# Patient Record
Sex: FEMALE | Race: WHITE | Employment: OTHER | URBAN - METROPOLITAN AREA
[De-identification: names, ages, dates, MRNs, and addresses within clinical notes are randomized per-mention and may not be internally consistent; named-entity substitution may affect disease eponyms.]

---

## 2017-01-26 ENCOUNTER — HOSPITAL ENCOUNTER (OUTPATIENT)
Dept: ULTRASOUND IMAGING | Age: 82
Discharge: HOME | End: 2017-01-26
Attending: FAMILY MEDICINE | Admitting: FAMILY MEDICINE

## 2017-01-26 DIAGNOSIS — E83.52 HYPERCALCEMIA: ICD-10-CM

## 2017-01-30 ENCOUNTER — HOSPITAL ENCOUNTER (OUTPATIENT)
Dept: GENERAL RADIOLOGY | Age: 82
Discharge: HOME | End: 2017-01-30
Attending: FAMILY MEDICINE | Admitting: FAMILY MEDICINE

## 2017-01-30 DIAGNOSIS — M81.0 AGE-RELATED OSTEOPOROSIS WITHOUT CURRENT PATHOLOGICAL FRACTURE: ICD-10-CM

## 2017-01-30 DIAGNOSIS — M81.0 OSTEOPOROSIS: ICD-10-CM

## 2017-02-14 ENCOUNTER — OFFICE VISIT (OUTPATIENT)
Dept: ENT CLINIC | Age: 82
End: 2017-02-14

## 2017-02-14 VITALS — WEIGHT: 115 LBS | HEIGHT: 63 IN | BODY MASS INDEX: 20.38 KG/M2

## 2017-02-14 DIAGNOSIS — E04.2 MULTINODULAR GOITER: Primary | ICD-10-CM

## 2017-02-14 PROCEDURE — 4040F PNEUMOC VAC/ADMIN/RCVD: CPT | Performed by: OTOLARYNGOLOGY

## 2017-02-14 PROCEDURE — 1036F TOBACCO NON-USER: CPT | Performed by: OTOLARYNGOLOGY

## 2017-02-14 PROCEDURE — 1123F ACP DISCUSS/DSCN MKR DOCD: CPT | Performed by: OTOLARYNGOLOGY

## 2017-02-14 PROCEDURE — G8484 FLU IMMUNIZE NO ADMIN: HCPCS | Performed by: OTOLARYNGOLOGY

## 2017-02-14 PROCEDURE — 99202 OFFICE O/P NEW SF 15 MIN: CPT | Performed by: OTOLARYNGOLOGY

## 2017-02-14 PROCEDURE — 1090F PRES/ABSN URINE INCON ASSESS: CPT | Performed by: OTOLARYNGOLOGY

## 2017-02-14 PROCEDURE — G8399 PT W/DXA RESULTS DOCUMENT: HCPCS | Performed by: OTOLARYNGOLOGY

## 2017-02-14 PROCEDURE — G8419 CALC BMI OUT NRM PARAM NOF/U: HCPCS | Performed by: OTOLARYNGOLOGY

## 2017-02-14 PROCEDURE — G8427 DOCREV CUR MEDS BY ELIG CLIN: HCPCS | Performed by: OTOLARYNGOLOGY

## 2017-02-14 RX ORDER — AMLODIPINE BESYLATE 5 MG/1
10 TABLET ORAL NIGHTLY
Status: ON HOLD | COMMUNITY
Start: 2017-01-14 | End: 2021-07-21 | Stop reason: HOSPADM

## 2017-02-14 RX ORDER — HYDROCHLOROTHIAZIDE 12.5 MG/1
TABLET ORAL NIGHTLY
Status: ON HOLD | COMMUNITY
Start: 2017-02-03 | End: 2017-03-15 | Stop reason: HOSPADM

## 2017-02-15 ENCOUNTER — TELEPHONE (OUTPATIENT)
Dept: ENT CLINIC | Age: 82
End: 2017-02-15

## 2017-02-15 DIAGNOSIS — E04.9 GOITER: Primary | ICD-10-CM

## 2017-02-15 LAB — TSH SERPL DL<=0.05 MIU/L-ACNC: 1.02 UIU/ML (ref 0.27–4.2)

## 2017-02-15 PROCEDURE — 36415 COLL VENOUS BLD VENIPUNCTURE: CPT | Performed by: OTOLARYNGOLOGY

## 2017-02-17 ENCOUNTER — TELEPHONE (OUTPATIENT)
Dept: ENT CLINIC | Age: 82
End: 2017-02-17

## 2017-03-15 ENCOUNTER — TELEPHONE (OUTPATIENT)
Dept: ENT CLINIC | Age: 82
End: 2017-03-15

## 2017-03-30 ENCOUNTER — OFFICE VISIT (OUTPATIENT)
Dept: ENT CLINIC | Age: 82
End: 2017-03-30

## 2017-03-30 VITALS
SYSTOLIC BLOOD PRESSURE: 135 MMHG | WEIGHT: 122 LBS | HEIGHT: 62 IN | BODY MASS INDEX: 22.45 KG/M2 | HEART RATE: 83 BPM | DIASTOLIC BLOOD PRESSURE: 60 MMHG

## 2017-03-30 DIAGNOSIS — E04.2 MULTINODULAR GOITER: ICD-10-CM

## 2017-03-30 DIAGNOSIS — E21.3 HYPERPARATHYROIDISM (HCC): Primary | ICD-10-CM

## 2017-03-30 DIAGNOSIS — D35.1 PARATHYROID ADENOMA: ICD-10-CM

## 2017-03-30 PROCEDURE — G8419 CALC BMI OUT NRM PARAM NOF/U: HCPCS | Performed by: OTOLARYNGOLOGY

## 2017-03-30 PROCEDURE — 1090F PRES/ABSN URINE INCON ASSESS: CPT | Performed by: OTOLARYNGOLOGY

## 2017-03-30 PROCEDURE — 4040F PNEUMOC VAC/ADMIN/RCVD: CPT | Performed by: OTOLARYNGOLOGY

## 2017-03-30 PROCEDURE — 1036F TOBACCO NON-USER: CPT | Performed by: OTOLARYNGOLOGY

## 2017-03-30 PROCEDURE — 1111F DSCHRG MED/CURRENT MED MERGE: CPT | Performed by: OTOLARYNGOLOGY

## 2017-03-30 PROCEDURE — G8427 DOCREV CUR MEDS BY ELIG CLIN: HCPCS | Performed by: OTOLARYNGOLOGY

## 2017-03-30 PROCEDURE — G8482 FLU IMMUNIZE ORDER/ADMIN: HCPCS | Performed by: OTOLARYNGOLOGY

## 2017-03-30 PROCEDURE — 1123F ACP DISCUSS/DSCN MKR DOCD: CPT | Performed by: OTOLARYNGOLOGY

## 2017-03-30 PROCEDURE — G8399 PT W/DXA RESULTS DOCUMENT: HCPCS | Performed by: OTOLARYNGOLOGY

## 2017-03-30 PROCEDURE — 99214 OFFICE O/P EST MOD 30 MIN: CPT | Performed by: OTOLARYNGOLOGY

## 2017-05-11 ENCOUNTER — HOSPITAL ENCOUNTER (OUTPATIENT)
Dept: SURGERY | Age: 82
Discharge: HOME | End: 2017-05-11
Attending: OTOLARYNGOLOGY | Admitting: OTOLARYNGOLOGY

## 2017-05-11 VITALS
OXYGEN SATURATION: 92 % | TEMPERATURE: 98.6 F | BODY MASS INDEX: 21.3 KG/M2 | RESPIRATION RATE: 14 BRPM | WEIGHT: 116.44 LBS | HEART RATE: 66 BPM | DIASTOLIC BLOOD PRESSURE: 78 MMHG | SYSTOLIC BLOOD PRESSURE: 152 MMHG

## 2017-05-11 LAB
PARATHYROID HORMONE INTACT: 114.3 PG/ML (ref 14–72)
PARATHYROID HORMONE INTACT: 55.7 PG/ML (ref 14–72)

## 2017-05-11 PROCEDURE — 60500 EXPLORE PARATHYROID GLANDS: CPT | Performed by: OTOLARYNGOLOGY

## 2017-05-11 RX ORDER — MEPERIDINE HYDROCHLORIDE 25 MG/ML
12.5 INJECTION INTRAMUSCULAR; INTRAVENOUS; SUBCUTANEOUS EVERY 5 MIN PRN
Status: DISCONTINUED | OUTPATIENT
Start: 2017-05-11 | End: 2017-05-12 | Stop reason: HOSPADM

## 2017-05-11 RX ORDER — HYDROMORPHONE HCL 110MG/55ML
0.25 PATIENT CONTROLLED ANALGESIA SYRINGE INTRAVENOUS EVERY 5 MIN PRN
Status: DISCONTINUED | OUTPATIENT
Start: 2017-05-11 | End: 2017-05-12 | Stop reason: HOSPADM

## 2017-05-11 RX ORDER — HYDROMORPHONE HCL 110MG/55ML
0.5 PATIENT CONTROLLED ANALGESIA SYRINGE INTRAVENOUS EVERY 5 MIN PRN
Status: DISCONTINUED | OUTPATIENT
Start: 2017-05-11 | End: 2017-05-12 | Stop reason: HOSPADM

## 2017-05-11 RX ORDER — LABETALOL HYDROCHLORIDE 5 MG/ML
5 INJECTION, SOLUTION INTRAVENOUS EVERY 10 MIN PRN
Status: DISCONTINUED | OUTPATIENT
Start: 2017-05-11 | End: 2017-05-12 | Stop reason: HOSPADM

## 2017-05-11 RX ORDER — PROMETHAZINE HYDROCHLORIDE 25 MG/1
25 TABLET ORAL EVERY 6 HOURS PRN
Qty: 40 TABLET | Refills: 0 | Status: SHIPPED | OUTPATIENT
Start: 2017-05-11 | End: 2017-06-26

## 2017-05-11 RX ORDER — SODIUM CHLORIDE, SODIUM LACTATE, POTASSIUM CHLORIDE, CALCIUM CHLORIDE 600; 310; 30; 20 MG/100ML; MG/100ML; MG/100ML; MG/100ML
INJECTION, SOLUTION INTRAVENOUS CONTINUOUS
Status: DISCONTINUED | OUTPATIENT
Start: 2017-05-11 | End: 2017-05-12 | Stop reason: HOSPADM

## 2017-05-11 RX ORDER — FENTANYL CITRATE 50 UG/ML
50 INJECTION, SOLUTION INTRAMUSCULAR; INTRAVENOUS EVERY 5 MIN PRN
Status: DISCONTINUED | OUTPATIENT
Start: 2017-05-11 | End: 2017-05-12 | Stop reason: HOSPADM

## 2017-05-11 RX ORDER — SODIUM CHLORIDE 0.9 % (FLUSH) 0.9 %
10 SYRINGE (ML) INJECTION PRN
Status: DISCONTINUED | OUTPATIENT
Start: 2017-05-11 | End: 2017-05-12 | Stop reason: HOSPADM

## 2017-05-11 RX ORDER — PROMETHAZINE HYDROCHLORIDE 25 MG/ML
6.25 INJECTION, SOLUTION INTRAMUSCULAR; INTRAVENOUS PRN
Status: DISCONTINUED | OUTPATIENT
Start: 2017-05-11 | End: 2017-05-12 | Stop reason: HOSPADM

## 2017-05-11 RX ORDER — OXYCODONE HYDROCHLORIDE 5 MG/1
10 TABLET ORAL PRN
Status: ACTIVE | OUTPATIENT
Start: 2017-05-11 | End: 2017-05-11

## 2017-05-11 RX ORDER — SODIUM CHLORIDE 0.9 % (FLUSH) 0.9 %
10 SYRINGE (ML) INJECTION EVERY 12 HOURS SCHEDULED
Status: DISCONTINUED | OUTPATIENT
Start: 2017-05-11 | End: 2017-05-12 | Stop reason: HOSPADM

## 2017-05-11 RX ORDER — DIPHENHYDRAMINE HYDROCHLORIDE 50 MG/ML
12.5 INJECTION INTRAMUSCULAR; INTRAVENOUS
Status: ACTIVE | OUTPATIENT
Start: 2017-05-11 | End: 2017-05-11

## 2017-05-11 RX ORDER — SODIUM CHLORIDE 9 MG/ML
INJECTION, SOLUTION INTRAVENOUS CONTINUOUS
Status: DISCONTINUED | OUTPATIENT
Start: 2017-05-11 | End: 2017-05-12 | Stop reason: HOSPADM

## 2017-05-11 RX ORDER — SODIUM CHLORIDE, SODIUM LACTATE, POTASSIUM CHLORIDE, CALCIUM CHLORIDE 600; 310; 30; 20 MG/100ML; MG/100ML; MG/100ML; MG/100ML
INJECTION, SOLUTION INTRAVENOUS
Status: COMPLETED
Start: 2017-05-11 | End: 2017-05-11

## 2017-05-11 RX ORDER — CEFAZOLIN SODIUM 2 G/100ML
2 INJECTION, SOLUTION INTRAVENOUS
Status: COMPLETED | OUTPATIENT
Start: 2017-05-11 | End: 2017-05-11

## 2017-05-11 RX ORDER — LIDOCAINE HYDROCHLORIDE 10 MG/ML
0.5 INJECTION, SOLUTION EPIDURAL; INFILTRATION; INTRACAUDAL; PERINEURAL ONCE
Status: DISCONTINUED | OUTPATIENT
Start: 2017-05-11 | End: 2017-05-12 | Stop reason: HOSPADM

## 2017-05-11 RX ORDER — HYDROCODONE BITARTRATE AND ACETAMINOPHEN 5; 325 MG/1; MG/1
TABLET ORAL
Qty: 80 TABLET | Refills: 0 | Status: SHIPPED | OUTPATIENT
Start: 2017-05-11 | End: 2017-06-26

## 2017-05-11 RX ORDER — OXYCODONE HYDROCHLORIDE 5 MG/1
5 TABLET ORAL PRN
Status: ACTIVE | OUTPATIENT
Start: 2017-05-11 | End: 2017-05-11

## 2017-05-11 RX ORDER — CEFAZOLIN SODIUM 2 G/100ML
INJECTION, SOLUTION INTRAVENOUS
Status: DISPENSED
Start: 2017-05-11 | End: 2017-05-11

## 2017-05-11 RX ADMIN — SODIUM CHLORIDE, SODIUM LACTATE, POTASSIUM CHLORIDE, CALCIUM CHLORIDE: 600; 310; 30; 20 INJECTION, SOLUTION INTRAVENOUS at 07:00

## 2017-05-11 RX ADMIN — CEFAZOLIN SODIUM 2 G: 2 INJECTION, SOLUTION INTRAVENOUS at 07:29

## 2017-05-11 ASSESSMENT — PAIN SCALES - GENERAL: PAINLEVEL_OUTOF10: 0

## 2017-05-11 ASSESSMENT — PAIN - FUNCTIONAL ASSESSMENT: PAIN_FUNCTIONAL_ASSESSMENT: 0-10

## 2017-05-11 ASSESSMENT — ENCOUNTER SYMPTOMS: SHORTNESS OF BREATH: 0

## 2017-05-12 ENCOUNTER — TELEPHONE (OUTPATIENT)
Dept: ENT CLINIC | Age: 82
End: 2017-05-12

## 2017-05-17 ENCOUNTER — HOSPITAL ENCOUNTER (OUTPATIENT)
Dept: OTHER | Age: 82
Discharge: HOME | End: 2017-05-17
Attending: OTOLARYNGOLOGY | Admitting: OTOLARYNGOLOGY

## 2017-05-17 ENCOUNTER — OFFICE VISIT (OUTPATIENT)
Dept: ENT CLINIC | Age: 82
End: 2017-05-17

## 2017-05-17 VITALS
SYSTOLIC BLOOD PRESSURE: 108 MMHG | DIASTOLIC BLOOD PRESSURE: 65 MMHG | BODY MASS INDEX: 21.35 KG/M2 | HEIGHT: 62 IN | WEIGHT: 116 LBS | HEART RATE: 50 BPM

## 2017-05-17 DIAGNOSIS — E83.51 HYPOCALCEMIA: Primary | ICD-10-CM

## 2017-05-17 DIAGNOSIS — E83.51 HYPOCALCEMIA: ICD-10-CM

## 2017-05-17 LAB — CALCIUM SERPL-MCNC: 9.3 MG/DL (ref 8.3–10.6)

## 2017-05-17 PROCEDURE — 99024 POSTOP FOLLOW-UP VISIT: CPT | Performed by: OTOLARYNGOLOGY

## 2017-06-26 ENCOUNTER — OFFICE VISIT (OUTPATIENT)
Dept: ENT CLINIC | Age: 82
End: 2017-06-26

## 2017-06-26 VITALS
HEIGHT: 62 IN | WEIGHT: 116 LBS | SYSTOLIC BLOOD PRESSURE: 118 MMHG | DIASTOLIC BLOOD PRESSURE: 60 MMHG | BODY MASS INDEX: 21.35 KG/M2 | HEART RATE: 55 BPM

## 2017-06-26 DIAGNOSIS — Z09 POSTOP CHECK: Primary | ICD-10-CM

## 2017-06-26 PROCEDURE — 99024 POSTOP FOLLOW-UP VISIT: CPT | Performed by: OTOLARYNGOLOGY

## 2018-01-29 ENCOUNTER — PROCEDURE VISIT (OUTPATIENT)
Dept: NEUROLOGY | Age: 83
End: 2018-01-29

## 2018-01-29 DIAGNOSIS — R20.0 RIGHT LEG NUMBNESS: Primary | ICD-10-CM

## 2018-01-29 PROCEDURE — 95886 MUSC TEST DONE W/N TEST COMP: CPT | Performed by: PSYCHIATRY & NEUROLOGY

## 2018-01-29 PROCEDURE — 95908 NRV CNDJ TST 3-4 STUDIES: CPT | Performed by: PSYCHIATRY & NEUROLOGY

## 2018-02-07 ENCOUNTER — HOSPITAL ENCOUNTER (OUTPATIENT)
Dept: GENERAL RADIOLOGY | Age: 83
Discharge: HOME | End: 2018-02-07
Attending: INTERNAL MEDICINE | Admitting: INTERNAL MEDICINE

## 2018-02-07 DIAGNOSIS — M81.0 AGE-RELATED OSTEOPOROSIS WITHOUT CURRENT PATHOLOGICAL FRACTURE: ICD-10-CM

## 2020-06-17 ENCOUNTER — APPOINTMENT (OUTPATIENT)
Dept: CT IMAGING | Age: 85
DRG: 069 | End: 2020-06-17
Payer: MEDICARE

## 2020-06-17 ENCOUNTER — APPOINTMENT (OUTPATIENT)
Dept: GENERAL RADIOLOGY | Age: 85
DRG: 069 | End: 2020-06-17
Payer: MEDICARE

## 2020-06-17 ENCOUNTER — HOSPITAL ENCOUNTER (INPATIENT)
Age: 85
LOS: 1 days | Discharge: HOME | DRG: 069 | End: 2020-06-18
Attending: INTERNAL MEDICINE | Admitting: INTERNAL MEDICINE
Payer: MEDICARE

## 2020-06-17 PROBLEM — N32.81 OAB (OVERACTIVE BLADDER): Status: ACTIVE | Noted: 2020-06-17

## 2020-06-17 PROBLEM — G45.9 TIA (TRANSIENT ISCHEMIC ATTACK): Status: ACTIVE | Noted: 2020-06-17

## 2020-06-17 PROBLEM — I10 HTN (HYPERTENSION): Status: ACTIVE | Noted: 2020-06-17

## 2020-06-17 LAB
A/G RATIO: 1.4 (ref 1.1–2.2)
ALBUMIN SERPL-MCNC: 4.3 G/DL (ref 3.4–5)
ALP BLD-CCNC: 74 U/L (ref 40–129)
ALT SERPL-CCNC: 10 U/L (ref 10–40)
ANION GAP SERPL CALCULATED.3IONS-SCNC: 12 MMOL/L (ref 3–16)
APTT: 31.7 SEC (ref 24.2–36.2)
AST SERPL-CCNC: 21 U/L (ref 15–37)
BASOPHILS ABSOLUTE: 0.1 K/UL (ref 0–0.2)
BASOPHILS RELATIVE PERCENT: 1.1 %
BILIRUB SERPL-MCNC: 0.5 MG/DL (ref 0–1)
BUN BLDV-MCNC: 15 MG/DL (ref 7–20)
CALCIUM SERPL-MCNC: 9.7 MG/DL (ref 8.3–10.6)
CHLORIDE BLD-SCNC: 99 MMOL/L (ref 99–110)
CO2: 23 MMOL/L (ref 21–32)
CREAT SERPL-MCNC: 0.8 MG/DL (ref 0.6–1.2)
EOSINOPHILS ABSOLUTE: 0.2 K/UL (ref 0–0.6)
EOSINOPHILS RELATIVE PERCENT: 2.5 %
GFR AFRICAN AMERICAN: >60
GFR NON-AFRICAN AMERICAN: >60
GLOBULIN: 3 G/DL
GLUCOSE BLD-MCNC: 90 MG/DL (ref 70–99)
GLUCOSE BLD-MCNC: 92 MG/DL (ref 70–99)
HCT VFR BLD CALC: 42.6 % (ref 36–48)
HEMOGLOBIN: 14.4 G/DL (ref 12–16)
INR BLD: 0.94 (ref 0.86–1.14)
LYMPHOCYTES ABSOLUTE: 2.2 K/UL (ref 1–5.1)
LYMPHOCYTES RELATIVE PERCENT: 25.7 %
MCH RBC QN AUTO: 31.4 PG (ref 26–34)
MCHC RBC AUTO-ENTMCNC: 33.8 G/DL (ref 31–36)
MCV RBC AUTO: 92.8 FL (ref 80–100)
MONOCYTES ABSOLUTE: 0.7 K/UL (ref 0–1.3)
MONOCYTES RELATIVE PERCENT: 8.4 %
NEUTROPHILS ABSOLUTE: 5.4 K/UL (ref 1.7–7.7)
NEUTROPHILS RELATIVE PERCENT: 62.3 %
PDW BLD-RTO: 14 % (ref 12.4–15.4)
PERFORMED ON: NORMAL
PLATELET # BLD: 215 K/UL (ref 135–450)
PMV BLD AUTO: 8.8 FL (ref 5–10.5)
POTASSIUM SERPL-SCNC: 4.4 MMOL/L (ref 3.5–5.1)
PRO-BNP: 997 PG/ML (ref 0–449)
PROTHROMBIN TIME: 10.9 SEC (ref 10–13.2)
RBC # BLD: 4.59 M/UL (ref 4–5.2)
SODIUM BLD-SCNC: 134 MMOL/L (ref 136–145)
TOTAL PROTEIN: 7.3 G/DL (ref 6.4–8.2)
TROPONIN: <0.01 NG/ML
WBC # BLD: 8.7 K/UL (ref 4–11)

## 2020-06-17 PROCEDURE — 6360000004 HC RX CONTRAST MEDICATION: Performed by: PHYSICIAN ASSISTANT

## 2020-06-17 PROCEDURE — 71045 X-RAY EXAM CHEST 1 VIEW: CPT

## 2020-06-17 PROCEDURE — 70450 CT HEAD/BRAIN W/O DYE: CPT

## 2020-06-17 PROCEDURE — 70498 CT ANGIOGRAPHY NECK: CPT

## 2020-06-17 PROCEDURE — 99285 EMERGENCY DEPT VISIT HI MDM: CPT

## 2020-06-17 PROCEDURE — 83880 ASSAY OF NATRIURETIC PEPTIDE: CPT

## 2020-06-17 PROCEDURE — 93005 ELECTROCARDIOGRAM TRACING: CPT | Performed by: PHYSICIAN ASSISTANT

## 2020-06-17 PROCEDURE — 36415 COLL VENOUS BLD VENIPUNCTURE: CPT

## 2020-06-17 PROCEDURE — 6370000000 HC RX 637 (ALT 250 FOR IP): Performed by: INTERNAL MEDICINE

## 2020-06-17 PROCEDURE — 85610 PROTHROMBIN TIME: CPT

## 2020-06-17 PROCEDURE — 85025 COMPLETE CBC W/AUTO DIFF WBC: CPT

## 2020-06-17 PROCEDURE — 2060000000 HC ICU INTERMEDIATE R&B

## 2020-06-17 PROCEDURE — 84484 ASSAY OF TROPONIN QUANT: CPT

## 2020-06-17 PROCEDURE — 80053 COMPREHEN METABOLIC PANEL: CPT

## 2020-06-17 PROCEDURE — 85730 THROMBOPLASTIN TIME PARTIAL: CPT

## 2020-06-17 RX ORDER — POTASSIUM CHLORIDE 20 MEQ/1
40 TABLET, EXTENDED RELEASE ORAL PRN
Status: DISCONTINUED | OUTPATIENT
Start: 2020-06-17 | End: 2020-06-18 | Stop reason: HOSPADM

## 2020-06-17 RX ORDER — SODIUM CHLORIDE 0.9 % (FLUSH) 0.9 %
10 SYRINGE (ML) INJECTION EVERY 12 HOURS SCHEDULED
Status: DISCONTINUED | OUTPATIENT
Start: 2020-06-17 | End: 2020-06-18 | Stop reason: HOSPADM

## 2020-06-17 RX ORDER — BISOPROLOL FUMARATE 5 MG/1
10 TABLET ORAL DAILY
Status: DISCONTINUED | OUTPATIENT
Start: 2020-06-18 | End: 2020-06-17 | Stop reason: CLARIF

## 2020-06-17 RX ORDER — LANOLIN ALCOHOL/MO/W.PET/CERES
1000 CREAM (GRAM) TOPICAL DAILY
COMMUNITY

## 2020-06-17 RX ORDER — AMLODIPINE BESYLATE 5 MG/1
5 TABLET ORAL NIGHTLY
Status: DISCONTINUED | OUTPATIENT
Start: 2020-06-17 | End: 2020-06-18 | Stop reason: HOSPADM

## 2020-06-17 RX ORDER — ASPIRIN 81 MG/1
81 TABLET ORAL DAILY
Status: DISCONTINUED | OUTPATIENT
Start: 2020-06-18 | End: 2020-06-18 | Stop reason: HOSPADM

## 2020-06-17 RX ORDER — HYDRALAZINE HYDROCHLORIDE 20 MG/ML
10 INJECTION INTRAMUSCULAR; INTRAVENOUS EVERY 6 HOURS PRN
Status: DISCONTINUED | OUTPATIENT
Start: 2020-06-17 | End: 2020-06-18 | Stop reason: HOSPADM

## 2020-06-17 RX ORDER — METOPROLOL TARTRATE 50 MG/1
50 TABLET, FILM COATED ORAL 2 TIMES DAILY
Status: DISCONTINUED | OUTPATIENT
Start: 2020-06-18 | End: 2020-06-18 | Stop reason: HOSPADM

## 2020-06-17 RX ORDER — SODIUM CHLORIDE 0.9 % (FLUSH) 0.9 %
10 SYRINGE (ML) INJECTION PRN
Status: DISCONTINUED | OUTPATIENT
Start: 2020-06-17 | End: 2020-06-18 | Stop reason: HOSPADM

## 2020-06-17 RX ORDER — LANOLIN ALCOHOL/MO/W.PET/CERES
1000 CREAM (GRAM) TOPICAL DAILY
Status: DISCONTINUED | OUTPATIENT
Start: 2020-06-18 | End: 2020-06-18 | Stop reason: HOSPADM

## 2020-06-17 RX ORDER — ASPIRIN 300 MG/1
300 SUPPOSITORY RECTAL DAILY
Status: DISCONTINUED | OUTPATIENT
Start: 2020-06-18 | End: 2020-06-18 | Stop reason: HOSPADM

## 2020-06-17 RX ORDER — TROSPIUM CHLORIDE 20 MG/1
20 TABLET, FILM COATED ORAL
Status: DISCONTINUED | OUTPATIENT
Start: 2020-06-18 | End: 2020-06-18 | Stop reason: HOSPADM

## 2020-06-17 RX ORDER — CETIRIZINE HYDROCHLORIDE 10 MG/1
5 TABLET ORAL DAILY
Status: DISCONTINUED | OUTPATIENT
Start: 2020-06-18 | End: 2020-06-18 | Stop reason: HOSPADM

## 2020-06-17 RX ORDER — 0.9 % SODIUM CHLORIDE 0.9 %
500 INTRAVENOUS SOLUTION INTRAVENOUS PRN
Status: DISCONTINUED | OUTPATIENT
Start: 2020-06-17 | End: 2020-06-18 | Stop reason: HOSPADM

## 2020-06-17 RX ORDER — POTASSIUM CHLORIDE 7.45 MG/ML
10 INJECTION INTRAVENOUS PRN
Status: DISCONTINUED | OUTPATIENT
Start: 2020-06-17 | End: 2020-06-18 | Stop reason: HOSPADM

## 2020-06-17 RX ORDER — VITAMIN B COMPLEX
1000 TABLET ORAL DAILY
Status: DISCONTINUED | OUTPATIENT
Start: 2020-06-18 | End: 2020-06-18 | Stop reason: HOSPADM

## 2020-06-17 RX ORDER — ACETAMINOPHEN 325 MG/1
650 TABLET ORAL EVERY 4 HOURS PRN
Status: DISCONTINUED | OUTPATIENT
Start: 2020-06-17 | End: 2020-06-18 | Stop reason: HOSPADM

## 2020-06-17 RX ORDER — PROMETHAZINE HYDROCHLORIDE 25 MG/1
12.5 TABLET ORAL EVERY 6 HOURS PRN
Status: DISCONTINUED | OUTPATIENT
Start: 2020-06-17 | End: 2020-06-18 | Stop reason: HOSPADM

## 2020-06-17 RX ORDER — LABETALOL HYDROCHLORIDE 5 MG/ML
10 INJECTION, SOLUTION INTRAVENOUS EVERY 10 MIN PRN
Status: DISCONTINUED | OUTPATIENT
Start: 2020-06-17 | End: 2020-06-18 | Stop reason: HOSPADM

## 2020-06-17 RX ORDER — ONDANSETRON 2 MG/ML
4 INJECTION INTRAMUSCULAR; INTRAVENOUS EVERY 6 HOURS PRN
Status: DISCONTINUED | OUTPATIENT
Start: 2020-06-17 | End: 2020-06-18 | Stop reason: HOSPADM

## 2020-06-17 RX ORDER — SOLIFENACIN SUCCINATE 10 MG/1
10 TABLET, FILM COATED ORAL DAILY
Status: DISCONTINUED | OUTPATIENT
Start: 2020-06-18 | End: 2020-06-17 | Stop reason: CLARIF

## 2020-06-17 RX ADMIN — ACETAMINOPHEN 650 MG: 325 TABLET, FILM COATED ORAL at 23:05

## 2020-06-17 RX ADMIN — IOPAMIDOL 75 ML: 755 INJECTION, SOLUTION INTRAVENOUS at 18:02

## 2020-06-17 RX ADMIN — AMLODIPINE BESYLATE 5 MG: 5 TABLET ORAL at 20:03

## 2020-06-17 ASSESSMENT — ENCOUNTER SYMPTOMS
NAUSEA: 0
COUGH: 0
SHORTNESS OF BREATH: 0
ABDOMINAL PAIN: 0
VOMITING: 0
RHINORRHEA: 0
WHEEZING: 0
DIARRHEA: 0

## 2020-06-17 ASSESSMENT — PAIN SCALES - GENERAL: PAINLEVEL_OUTOF10: 7

## 2020-06-17 ASSESSMENT — PAIN DESCRIPTION - LOCATION: LOCATION: NECK

## 2020-06-17 ASSESSMENT — PAIN DESCRIPTION - PAIN TYPE: TYPE: CHRONIC PAIN

## 2020-06-17 ASSESSMENT — PAIN DESCRIPTION - DESCRIPTORS: DESCRIPTORS: SORE

## 2020-06-17 NOTE — ED PROVIDER NOTES
dizziness/lightheadedness, focal weakness,  facial asymmetry, speech difficulty or syncope. Of note, patient does have chronic numbness to the right lower extremity status post a right knee replacement as well as a recent Lisfranc fracture to the right foot approximately 5 weeks ago. States that what she experienced today was different. She has no other complaints or concerns at this time. Nursing Notes were all reviewed and agreed with or any disagreements were addressed in the HPI. REVIEW OF SYSTEMS    (2-9 systems for level 4, 10 or more for level 5)     Review of Systems   Constitutional: Negative for appetite change, chills and fever. HENT: Negative for congestion and rhinorrhea. Eyes: Positive for visual disturbance. Respiratory: Negative for cough, shortness of breath and wheezing. Cardiovascular: Negative for chest pain. Gastrointestinal: Negative for abdominal pain, diarrhea, nausea and vomiting. Genitourinary: Negative for difficulty urinating, dysuria and hematuria. Musculoskeletal: Negative for neck pain and neck stiffness. Skin: Negative for rash. Neurological: Positive for weakness and numbness. Negative for dizziness, syncope, facial asymmetry, speech difficulty, light-headedness and headaches. Psychiatric/Behavioral: Positive for confusion. Positives and Pertinent negatives as per HPI. Except as noted above in the ROS, all other systems were reviewed and negative.        PAST MEDICAL HISTORY     Past Medical History:   Diagnosis Date    Allergies     Elevated cholesterol     Hypertension     Reflux     TIA (transient ischemic attack)          SURGICAL HISTORY     Past Surgical History:   Procedure Laterality Date    COLONOSCOPY      BX OR SECAL POLYP    CYSTOSCOPY      EXCISION OF PARATHYROID MASS  05/11/2017    EXCISION OF LEFT PARATHYROID ADENOMA WITH FROZEN SECTION    HEMORRHOID SURGERY      JOINT REPLACEMENT Right     KNEE    KIDNEY STONE SURGERY

## 2020-06-18 ENCOUNTER — APPOINTMENT (OUTPATIENT)
Dept: MRI IMAGING | Age: 85
DRG: 069 | End: 2020-06-18
Payer: MEDICARE

## 2020-06-18 ENCOUNTER — TELEPHONE (OUTPATIENT)
Dept: ENT CLINIC | Age: 85
End: 2020-06-18

## 2020-06-18 VITALS
BODY MASS INDEX: 23.55 KG/M2 | OXYGEN SATURATION: 96 % | WEIGHT: 128 LBS | HEART RATE: 63 BPM | RESPIRATION RATE: 18 BRPM | DIASTOLIC BLOOD PRESSURE: 73 MMHG | SYSTOLIC BLOOD PRESSURE: 156 MMHG | TEMPERATURE: 98.1 F | HEIGHT: 62 IN

## 2020-06-18 PROBLEM — J38.7 VALLECULAR MASS: Status: ACTIVE | Noted: 2020-06-18

## 2020-06-18 LAB
A/G RATIO: 1.4 (ref 1.1–2.2)
ALBUMIN SERPL-MCNC: 3.4 G/DL (ref 3.4–5)
ALP BLD-CCNC: 55 U/L (ref 40–129)
ALT SERPL-CCNC: 7 U/L (ref 10–40)
ANION GAP SERPL CALCULATED.3IONS-SCNC: 8 MMOL/L (ref 3–16)
AST SERPL-CCNC: 14 U/L (ref 15–37)
BASOPHILS ABSOLUTE: 0.1 K/UL (ref 0–0.2)
BASOPHILS RELATIVE PERCENT: 1 %
BILIRUB SERPL-MCNC: 0.5 MG/DL (ref 0–1)
BILIRUBIN URINE: NEGATIVE
BLOOD, URINE: NEGATIVE
BUN BLDV-MCNC: 12 MG/DL (ref 7–20)
CALCIUM SERPL-MCNC: 8.9 MG/DL (ref 8.3–10.6)
CHLORIDE BLD-SCNC: 104 MMOL/L (ref 99–110)
CHOLESTEROL, TOTAL: 169 MG/DL (ref 0–199)
CLARITY: CLEAR
CO2: 25 MMOL/L (ref 21–32)
COLOR: YELLOW
CREAT SERPL-MCNC: 0.7 MG/DL (ref 0.6–1.2)
EKG ATRIAL RATE: 69 BPM
EKG DIAGNOSIS: NORMAL
EKG P AXIS: 73 DEGREES
EKG P-R INTERVAL: 162 MS
EKG Q-T INTERVAL: 422 MS
EKG QRS DURATION: 78 MS
EKG QTC CALCULATION (BAZETT): 452 MS
EKG R AXIS: 15 DEGREES
EKG T AXIS: 40 DEGREES
EKG VENTRICULAR RATE: 69 BPM
EOSINOPHILS ABSOLUTE: 0.2 K/UL (ref 0–0.6)
EOSINOPHILS RELATIVE PERCENT: 3.2 %
EPITHELIAL CELLS, UA: 0 /HPF (ref 0–5)
ESTIMATED AVERAGE GLUCOSE: 99.7 MG/DL
GFR AFRICAN AMERICAN: >60
GFR NON-AFRICAN AMERICAN: >60
GLOBULIN: 2.4 G/DL
GLUCOSE BLD-MCNC: 85 MG/DL (ref 70–99)
GLUCOSE URINE: NEGATIVE MG/DL
HBA1C MFR BLD: 5.1 %
HCT VFR BLD CALC: 35.9 % (ref 36–48)
HDLC SERPL-MCNC: 53 MG/DL (ref 40–60)
HEMOGLOBIN: 12.2 G/DL (ref 12–16)
HYALINE CASTS: 1 /LPF (ref 0–8)
KETONES, URINE: NEGATIVE MG/DL
LDL CHOLESTEROL CALCULATED: 100 MG/DL
LEUKOCYTE ESTERASE, URINE: ABNORMAL
LYMPHOCYTES ABSOLUTE: 1.9 K/UL (ref 1–5.1)
LYMPHOCYTES RELATIVE PERCENT: 29.6 %
MCH RBC QN AUTO: 31.1 PG (ref 26–34)
MCHC RBC AUTO-ENTMCNC: 34 G/DL (ref 31–36)
MCV RBC AUTO: 91.6 FL (ref 80–100)
MICROSCOPIC EXAMINATION: YES
MONOCYTES ABSOLUTE: 0.8 K/UL (ref 0–1.3)
MONOCYTES RELATIVE PERCENT: 12.8 %
NEUTROPHILS ABSOLUTE: 3.5 K/UL (ref 1.7–7.7)
NEUTROPHILS RELATIVE PERCENT: 53.4 %
NITRITE, URINE: NEGATIVE
PDW BLD-RTO: 14.3 % (ref 12.4–15.4)
PH UA: 7.5 (ref 5–8)
PLATELET # BLD: 200 K/UL (ref 135–450)
PMV BLD AUTO: 8.9 FL (ref 5–10.5)
POTASSIUM REFLEX MAGNESIUM: 3.7 MMOL/L (ref 3.5–5.1)
PROTEIN UA: NEGATIVE MG/DL
RBC # BLD: 3.92 M/UL (ref 4–5.2)
RBC UA: 1 /HPF (ref 0–4)
SODIUM BLD-SCNC: 137 MMOL/L (ref 136–145)
SPECIFIC GRAVITY UA: 1.01 (ref 1–1.03)
TOTAL PROTEIN: 5.8 G/DL (ref 6.4–8.2)
TRIGL SERPL-MCNC: 80 MG/DL (ref 0–150)
URINE REFLEX TO CULTURE: ABNORMAL
URINE TYPE: ABNORMAL
UROBILINOGEN, URINE: 0.2 E.U./DL
VLDLC SERPL CALC-MCNC: 16 MG/DL
WBC # BLD: 6.5 K/UL (ref 4–11)
WBC UA: 2 /HPF (ref 0–5)

## 2020-06-18 PROCEDURE — 97162 PT EVAL MOD COMPLEX 30 MIN: CPT

## 2020-06-18 PROCEDURE — 36415 COLL VENOUS BLD VENIPUNCTURE: CPT

## 2020-06-18 PROCEDURE — 97535 SELF CARE MNGMENT TRAINING: CPT

## 2020-06-18 PROCEDURE — 85025 COMPLETE CBC W/AUTO DIFF WBC: CPT

## 2020-06-18 PROCEDURE — 83036 HEMOGLOBIN GLYCOSYLATED A1C: CPT

## 2020-06-18 PROCEDURE — 81001 URINALYSIS AUTO W/SCOPE: CPT

## 2020-06-18 PROCEDURE — 97116 GAIT TRAINING THERAPY: CPT

## 2020-06-18 PROCEDURE — 70551 MRI BRAIN STEM W/O DYE: CPT

## 2020-06-18 PROCEDURE — 80053 COMPREHEN METABOLIC PANEL: CPT

## 2020-06-18 PROCEDURE — 2580000003 HC RX 258: Performed by: INTERNAL MEDICINE

## 2020-06-18 PROCEDURE — 6360000002 HC RX W HCPCS: Performed by: INTERNAL MEDICINE

## 2020-06-18 PROCEDURE — 93010 ELECTROCARDIOGRAM REPORT: CPT | Performed by: INTERNAL MEDICINE

## 2020-06-18 PROCEDURE — 97530 THERAPEUTIC ACTIVITIES: CPT

## 2020-06-18 PROCEDURE — 97166 OT EVAL MOD COMPLEX 45 MIN: CPT

## 2020-06-18 PROCEDURE — 80061 LIPID PANEL: CPT

## 2020-06-18 PROCEDURE — 6370000000 HC RX 637 (ALT 250 FOR IP): Performed by: INTERNAL MEDICINE

## 2020-06-18 RX ORDER — LORAZEPAM 2 MG/ML
1 INJECTION INTRAMUSCULAR ONCE
Status: COMPLETED | OUTPATIENT
Start: 2020-06-18 | End: 2020-06-18

## 2020-06-18 RX ORDER — ASPIRIN 81 MG/1
81 TABLET ORAL DAILY
Qty: 30 TABLET | Refills: 3 | COMMUNITY
Start: 2020-06-19

## 2020-06-18 RX ADMIN — Medication 10 ML: at 01:45

## 2020-06-18 RX ADMIN — CETIRIZINE HYDROCHLORIDE 5 MG: 10 TABLET, FILM COATED ORAL at 09:25

## 2020-06-18 RX ADMIN — METOPROLOL TARTRATE 50 MG: 50 TABLET, FILM COATED ORAL at 09:24

## 2020-06-18 RX ADMIN — VITAMIN D, TAB 1000IU (100/BT) 1000 UNITS: 25 TAB at 09:24

## 2020-06-18 RX ADMIN — LORAZEPAM 1 MG: 2 INJECTION INTRAMUSCULAR; INTRAVENOUS at 11:01

## 2020-06-18 RX ADMIN — CYANOCOBALAMIN TAB 1000 MCG 1000 MCG: 1000 TAB at 09:25

## 2020-06-18 RX ADMIN — ENOXAPARIN SODIUM 40 MG: 40 INJECTION SUBCUTANEOUS at 09:25

## 2020-06-18 RX ADMIN — TROSPIUM CHLORIDE 20 MG: 20 TABLET, FILM COATED ORAL at 07:23

## 2020-06-18 RX ADMIN — Medication 10 ML: at 09:26

## 2020-06-18 RX ADMIN — ASPIRIN 81 MG: 81 TABLET, COATED ORAL at 09:23

## 2020-06-18 ASSESSMENT — PAIN SCALES - GENERAL
PAINLEVEL_OUTOF10: 0
PAINLEVEL_OUTOF10: 0

## 2020-06-18 ASSESSMENT — ENCOUNTER SYMPTOMS
VOMITING: 0
BACK PAIN: 0
SHORTNESS OF BREATH: 0
FACIAL SWELLING: 0
NAUSEA: 0
COUGH: 0
EYE ITCHING: 0

## 2020-06-18 NOTE — PROGRESS NOTES
Retired  Leisure & Hobbies: shopping   IADL Comments: spouse assists with grocery shopping, money management, and home management task as well  Additional Comments: Pt reports 1 fall in the last 6 months        Objective   Vision: Impaired  Vision Exceptions: (Implantable contact lens )  Hearing: Within functional limits    Orientation  Overall Orientation Status: Within Normal Limits  Balance  Sitting Balance: Independent  Standing Balance: Contact guard assistance  Standing Balance  Time: 10 minutes  Activity: Functional mobility, functional transfers, ADL completion  Comment: min(A) to correct 1 LOB; CGA during all other functional tasks this date  Functional Mobility  Functional - Mobility Device: Wheelchair  Activity: Other(in hospital hallway)  Assist Level: Contact guard assistance  Functional Mobility Comments: Pt ambulated 79' with no device and demo increased unsteadiness requiring CGA; pt ambulated 150' with RW and demo increased stability, however, still requiring CGA for safety. Pt completed x10 stairs with CGA and heavy hold on handrail this date. Per pt report she has osteoporosis, R knee surgery, and broke her R foot which could contribute to deficits in R side while walking. See PT evaluation for further detail. ADL  UE Dressing: Independent  LE Dressing: Minimal assistance(min(A) provided d/t 1 LOB while pulling pants fully over hips; however able to IND susie/doff pants, socks, shoes, and ankle brace. )  Additional Comments: Pt had 1 LOB while pulling pants fully over hips in stance requiring min(A) to correct; pt's daughter reports that pt is very sensitive to medication which could be contributing to unsteadiness  Tone RUE  RUE Tone: Normotonic  Tone LUE  LUE Tone: Normotonic  Coordination  Movements Are Fluid And Coordinated: Yes  Bed mobility  Supine to Sit: Independent  Scooting: Independent  Transfers  Sit to stand: Independent  Stand to sit:  Independent  Cognition  Overall Cognitive

## 2020-06-18 NOTE — PROGRESS NOTES
Progress Note - Dr. Rhys Hall - Internal Medicine  PCP: Breanna Gomez MD [address] / [address] [place] [address] [phone]    Hospital Day: 1  Code Status: Full Code  Current Diet: DIET GENERAL; No Added Salt (3-4 GM)        CC: follow up on medical issues    Subjective:   Inessa Pérez is a 80 y.o. female. She denies problems    Doing ok  No new issues  No new sx  Family in room, confirms that pt is at baseline    CTA reviewed - there is a mass in R vallecula noted  ENT eval requested  If this cannot be done while inpt, have recommended outpt f/u    She denies chest pain, denies shortness of breath, denies nausea,  denies emesis. 10 system Review of Systems is reviewed with patient, and pertinent positives are listed here: None . Otherwise, Review of systems is negative. I have reviewed the patient's medical and social history in detail and updated the computerized patient record. To recap: She  has a past medical history of Allergies, Elevated cholesterol, Hypertension, Reflux, and TIA (transient ischemic attack). . She  has a past surgical history that includes Kidney stone surgery; Colonoscopy; Skin cancer excision; joint replacement (Right); Cystoscopy; Hemorrhoid surgery; and Excision of Parathyroid Mass (05/11/2017). . She  reports that she has never smoked. She has never used smokeless tobacco. She reports that she does not drink alcohol or use drugs. .        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 Stable. Plan: Continue present orders/plan. Multinodular goiter -Established problem. Stable. Plan: cont meds    HTN (hypertension) -Established problem. Stable. 156/73  Plan: stay on same meds    OAB (overactive bladder)    Vallecular mass -New Problem to me. Seen on cta  Plan: consult ENT   Anxiety - New Problem to me.   Pt uncomfortable with MRI  Plan - iv ativan ordered          Niels Fees  6/18/2020

## 2020-06-18 NOTE — PROGRESS NOTES
intact  Memory: Appears intact  Safety Judgement: Decreased awareness of need for safety  Problem Solving: Assistance required to identify errors made  Insights: Decreased awareness of deficits  Initiation: Does not require cues  Sequencing: Does not require cues  Cognition Comment: Pt cognition WFL, however, demo deficits in deficits and safety awareness. Pt reporting that she has had several falls in the past, however, believes her balance is fine and her unsteadiness this date is d/t medication taken during this hospital stay. Objective  AROM RLE (degrees)  RLE AROM: WFL  AROM LLE (degrees)  LLE AROM : WFL  Strength RLE  Strength RLE: Exception  Comment: grossly 3/5  Strength LLE  Strength LLE: Exception  Comment: hip grossly 3/5; knee and ankle grossly 3+/5  Tone RLE  RLE Tone: Normotonic  Tone LLE  LLE Tone: Normotonic  Motor Control  Gross Motor?: WFL  Sensation  Overall Sensation Status: WFL  Bed mobility  Supine to Sit: Independent  Scooting: Independent  Transfers  Sit to Stand: Supervision(x3 from EOB)  Stand to sit: Supervision  Comment: Pt with one LOB when standing from EOB to susie pants requiring Min A to correct and maintain balance. Ambulation  Ambulation?: Yes  More Ambulation?: Yes  Ambulation 1  Surface: level tile  Device: No Device  Assistance: Minimal assistance;Contact guard assistance  Quality of Gait: wide Kathryn, decreased lavern, increased medial-lateral sway, deviation from path with slight staggering noted  Distance: 79'  Comments: Pt required increased time to perform. Pt intermittently reaching for abreu rail to assist with balance and veering towards L side of abreu requiring min TC at hips for stability.    Ambulation 2  Surface - 2: level tile  Device 2: Rolling Walker  Assistance 2: Contact guard assistance  Quality of Gait 2: decreased Kathryn, intermittently increased lavern, slight deviation from path continues to be noted  Distance: 150'  Comments: Pt continues to slightly and intermittently veer towards L and required min VC for walker management and to stay within boundaries. Pt with safer mobility with walker and educated to use RW at home upon d/c. Daughter present and verbalizing understanding. Stairs/Curb  Stairs?: Yes  Stairs  # Steps : 10  Stairs Height: 6\"  Rails: Right ascending  Device: No Device  Assistance: Contact guard assistance  Comment: Negotiated with mixture of reciprocal and step-to gait pattern with BHH on HR. No LOB. Required increased time to perform. Balance  Posture: Good  Sitting - Static: Good;-(Supervision seated at EOB)  Sitting - Dynamic: Good;-(Supervision/SBA seated at EOB for dressing with OT ~10-15 minutes total)  Standing - Static: Fair(CGA with RW for UE support)  Standing - Dynamic: Fair(CGA with RW for UE support)        Plan   Plan  Times per week: 3-5x  Times per day: Daily  Current Treatment Recommendations: Strengthening, Balance Training, Functional Mobility Training, Transfer Training, Endurance Training, Gait Training, Stair training, Neuromuscular Re-education, Safety Education & Training, Modalities, Equipment Evaluation, Education, & procurement, Patient/Caregiver Education & Training  Safety Devices  Type of devices:  All fall risk precautions in place, Call light within reach, Chair alarm in place, Gait belt, Nurse notified, Patience Lofty in use, Left in chair  Restraints  Initially in place: No    G-Code       OutComes Score        AM-PAC Score  AM-PAC Inpatient Mobility Raw Score : 18 (06/18/20 1410)  AM-PAC Inpatient T-Scale Score : 43.63 (06/18/20 1410)  Mobility Inpatient CMS 0-100% Score: 46.58 (06/18/20 1410)  Mobility Inpatient CMS G-Code Modifier : CK (06/18/20 1410)          Goals  Short term goals  Time Frame for Short term goals: upon d/c  Short term goal 1: Pt will perform transfers with LRAD MOD I   Short term goal 2: Pt will ambulate 150' with LRAD and supervision  Short term goal 3: Pt will negotiate 2 steps with LRAD

## 2020-06-18 NOTE — ED NOTES
Report given to Kyler lee on 4 tower.  Tele visble     [address] [facility]  06/17/20 2119

## 2020-06-18 NOTE — PROGRESS NOTES
Arrived to room 3378. Alert and oriented. Oriented to room. Plan of care discussed with patient and daughter.  POOJA

## 2020-06-19 NOTE — DISCHARGE SUMMARY
dizziness/lightheadedness, focal weakness,  facial asymmetry, speech difficulty or syncope.  Of note, patient does have chronic numbness to the right lower extremity status post a right knee replacement as well as a recent Lisfranc fracture to the right foot approximately 5 weeks ago.  States that what she experienced today was different. Raymundo Kaminski has no other complaints or concerns at this time.     CT Head from ER reivewed by self on computer       Impression:         No acute intracranial abnormality.         Pt to be admitted for further workup    CTA HEad/Neck shows   Impression:       No high-grade stenosis or focal occlusion involving the intracranial or  cervical vasculature.  No evidence of acute dissection.  No evidence of  aneurysm. Approximate 10 x 9 mm low-density lesion within the right aspect of the  vallecula.  Recommend correlation with direct visualization to exclude early  head and neck neoplasm. Interlobular septal thickening at the lung apices may indicate mild  interstitial edema.  Correlate with dedicated chest imaging. MRI Brain then done   Impression:       1. No acute infarct or acute intracranial process identified. 2. Mild diffuse cerebral volume loss and chronic small vessel ischemic  changes. All of her neuro sx are resolved and have not recurreed  As such, she is recommended to be discharged and follow up with her PCP    The 10x9mm R vallecular lesion is discussed with pt and family  I do not apprecaite any mass on direct visualization  Family would like to followup as outpt with ENT.   This will be arranged    Pt discharged home    Consults made during Hospitalization:  Sergio Villalobos TO OTOLARYNGOLOGY    Treatment team at time of Discharge: Treatment Team: Consulting Physician: Ludmila Ibarra MD; Respiratory Therapist (Day): Cheyenne Bates RCP; Consulting Physician: Manuel Swift MD; Utilization Reviewer: Desmond Tovar RN; Registered briefly.) Acuity: Unknown Type of Exam: Unknown FINDINGS: No acute bony abnormality. The heart size and mediastinal contours are within normal limits for technique. The lungs are clear without overt edema, large effusion or lobar consolidation. Negative portable study. Cta Head Neck W Contrast    Result Date: 6/17/2020  EXAMINATION: CTA OF THE HEAD AND NECK WITH CONTRAST, 6/17/2020 5:54 pm: TECHNIQUE: CTA of the head and neck was performed with the administration of intravenous contrast. Multiplanar reformatted images are provided for review. MIP images are provided for review. Stenosis of the internal carotid arteries measured using NASCET criteria. Dose modulation, iterative reconstruction, and/or weight based adjustment of the mA/kV was utilized to reduce the radiation dose to as low as reasonably achievable. COMPARISON: None HISTORY: ORDERING SYSTEM PROVIDED HISTORY: R vision changes TECHNOLOGIST PROVIDED HISTORY: Reason for exam:->R vision changes Reason for Exam: R vision changes Acuity: Acute Type of Exam: Initial FINDINGS: CTA NECK: AORTIC ARCH/ARCH VESSELS: No dissection or arterial injury. No significant stenosis of the brachiocephalic or subclavian arteries. CAROTID ARTERIES: No dissection, arterial injury, or hemodynamically significant stenosis by NASCET criteria. Mild atherosclerotic disease involving the bilateral carotid bifurcation. VERTEBRAL ARTERIES: No dissection, arterial injury, or significant stenosis. Scattered atherosclerotic calcifications involving the vertebral arteries. SOFT TISSUES: Interlobular septal thickening at the lung apices. 10 x 9 mm low-density lesion within the right aspect of the vallecula. BONES: No acute osseous abnormality. CTA HEAD: ANTERIOR CIRCULATION: Atherosclerotic disease involving the intracranial internal carotid arteries. No significant stenosis of the intracranial internal carotid, anterior cerebral, or middle cerebral arteries. No aneurysm.

## 2020-06-19 NOTE — TELEPHONE ENCOUNTER
Spoke to pt, patient will call back after talking to her daughter, her daughter will be bring her so she need to discuss the time and a day.

## 2020-06-22 ENCOUNTER — OFFICE VISIT (OUTPATIENT)
Dept: ENT CLINIC | Age: 85
End: 2020-06-22
Payer: MEDICARE

## 2020-06-22 VITALS
BODY MASS INDEX: 22.82 KG/M2 | WEIGHT: 124 LBS | TEMPERATURE: 97.7 F | HEIGHT: 62 IN | SYSTOLIC BLOOD PRESSURE: 170 MMHG | HEART RATE: 52 BPM | DIASTOLIC BLOOD PRESSURE: 77 MMHG

## 2020-06-22 PROBLEM — E78.2 MIXED HYPERLIPIDEMIA: Status: ACTIVE | Noted: 2020-06-22

## 2020-06-22 PROBLEM — R13.10 DYSPHAGIA: Status: ACTIVE | Noted: 2020-06-22

## 2020-06-22 PROBLEM — M81.0 SENILE OSTEOPOROSIS: Status: ACTIVE | Noted: 2018-01-24

## 2020-06-22 PROBLEM — F41.9 ANXIETY: Status: ACTIVE | Noted: 2020-06-22

## 2020-06-22 PROBLEM — K21.9 GASTROESOPHAGEAL REFLUX DISEASE WITHOUT ESOPHAGITIS: Status: ACTIVE | Noted: 2020-06-22

## 2020-06-22 PROCEDURE — 31575 DIAGNOSTIC LARYNGOSCOPY: CPT | Performed by: OTOLARYNGOLOGY

## 2020-06-22 NOTE — PROGRESS NOTES
all appeared to be normal, with no lesions. Visualization was excellent throughout the examination. DESCRIPTION OF PROCEDURE:  The right and left nasal cavity was topically anesthetized and decongested with a 50-50 mixture of 0.05% oxymetazoline solution and topical 4% lidocaine solution by nasal sprayer, twice. After about ten minutes, the flexible fiberoptic nasopharyngolaryngoscope, with camera attached, was inserted through the right nare/nasal cavity and advanced to the nasopharynx and then to the hypopharynx and larynx. The [employer] video system was used. After adequate endoscopic visualization, the endoscope was removed. The patient appeared to tolerate the procedure well with no evidence of perioperative complications. REVIEW OF IMAGES:       I independently reviewed the images of the CTA scan of the head and neck showing a mass/cyst of the right vallecular/epiglottis. See images and report for details. Narrative   EXAMINATION:   CTA OF THE HEAD AND NECK WITH CONTRAST, 6/17/2020 5:54 pm:            SOFT TISSUES:       Interlobular septal thickening at the lung apices.       10 x 9 mm low-density lesion within the right aspect of the vallecula. Impression  Impression           2. Approximate 10 x 9 mm low-density lesion within the right aspect of the   vallecula.  Recommend correlation with direct visualization to exclude early   head and neck neoplasm. COUNSELING FOR DIRECT LARYNGOSCOPY, WITH SUSPENSION, WITH MICROSCOPE, AND BIOPSY OR EXCISION OF CYST/MASS OF RIGHT VALLECULA:  Severiano Erm was advised of the recommended surgery/procedure, of direct laryngoscopy and excision or biopsy of the mass/lesion of the right vallecula and/or epiglottis. Severiano Erm stated understanding and acceptance that this will be done under general anesthesia. The surgical procedure was described. I discussed the surgical technique and the usual post operative course.   I discussed the possibility

## 2020-06-26 ENCOUNTER — TELEPHONE (OUTPATIENT)
Dept: ENT CLINIC | Age: 85
End: 2020-06-26

## 2020-06-26 NOTE — TELEPHONE ENCOUNTER
Patient's daughter Ernestina Razo phoned and stated patient wanted to cancel surgery scheduled for 07/10/20 due to the recent increase in COVID cases. Leia Fajardo stated when mother is ready to reschedule they will call the office. I informed Leia Fajardo that Dr. Michele Leon may need to recheck her in the office before we reschedule to see if there has been any changes. Leia Fajardo stated understanding. They will call when they are ready to reschedule.

## 2020-08-25 ENCOUNTER — APPOINTMENT (OUTPATIENT)
Dept: CT IMAGING | Age: 85
DRG: 069 | End: 2020-08-25
Payer: MEDICARE

## 2020-08-25 ENCOUNTER — APPOINTMENT (OUTPATIENT)
Dept: GENERAL RADIOLOGY | Age: 85
DRG: 069 | End: 2020-08-25
Payer: MEDICARE

## 2020-08-25 ENCOUNTER — HOSPITAL ENCOUNTER (INPATIENT)
Age: 85
LOS: 2 days | Discharge: HOME HEALTH | DRG: 069 | End: 2020-08-27
Attending: EMERGENCY MEDICINE | Admitting: INTERNAL MEDICINE
Payer: MEDICARE

## 2020-08-25 PROBLEM — R41.82 ALTERED MENTAL STATE: Status: ACTIVE | Noted: 2020-08-25

## 2020-08-25 LAB
A/G RATIO: 1.7 (ref 1.1–2.2)
ALBUMIN SERPL-MCNC: 4.3 G/DL (ref 3.4–5)
ALP BLD-CCNC: 63 U/L (ref 40–129)
ALT SERPL-CCNC: 10 U/L (ref 10–40)
ANION GAP SERPL CALCULATED.3IONS-SCNC: 10 MMOL/L (ref 3–16)
APTT: 28.4 SEC (ref 24.2–36.2)
AST SERPL-CCNC: 22 U/L (ref 15–37)
BASOPHILS ABSOLUTE: 0.1 K/UL (ref 0–0.2)
BASOPHILS RELATIVE PERCENT: 1.8 %
BILIRUB SERPL-MCNC: 0.4 MG/DL (ref 0–1)
BILIRUBIN URINE: NEGATIVE
BLOOD, URINE: NEGATIVE
BUN BLDV-MCNC: 18 MG/DL (ref 7–20)
CALCIUM SERPL-MCNC: 9.4 MG/DL (ref 8.3–10.6)
CHLORIDE BLD-SCNC: 100 MMOL/L (ref 99–110)
CLARITY: CLEAR
CO2: 26 MMOL/L (ref 21–32)
COLOR: YELLOW
CREAT SERPL-MCNC: 0.8 MG/DL (ref 0.6–1.2)
EOSINOPHILS ABSOLUTE: 0.1 K/UL (ref 0–0.6)
EOSINOPHILS RELATIVE PERCENT: 1.5 %
GFR AFRICAN AMERICAN: >60
GFR NON-AFRICAN AMERICAN: >60
GLOBULIN: 2.6 G/DL
GLUCOSE BLD-MCNC: 118 MG/DL (ref 70–99)
GLUCOSE URINE: NEGATIVE MG/DL
HCT VFR BLD CALC: 40.1 % (ref 36–48)
HEMOGLOBIN: 13.6 G/DL (ref 12–16)
INR BLD: 0.93 (ref 0.86–1.14)
KETONES, URINE: NEGATIVE MG/DL
LEUKOCYTE ESTERASE, URINE: NEGATIVE
LYMPHOCYTES ABSOLUTE: 1.3 K/UL (ref 1–5.1)
LYMPHOCYTES RELATIVE PERCENT: 16.2 %
MCH RBC QN AUTO: 30.6 PG (ref 26–34)
MCHC RBC AUTO-ENTMCNC: 34 G/DL (ref 31–36)
MCV RBC AUTO: 89.9 FL (ref 80–100)
MICROSCOPIC EXAMINATION: NORMAL
MONOCYTES ABSOLUTE: 0.4 K/UL (ref 0–1.3)
MONOCYTES RELATIVE PERCENT: 5.3 %
NEUTROPHILS ABSOLUTE: 6.2 K/UL (ref 1.7–7.7)
NEUTROPHILS RELATIVE PERCENT: 75.2 %
NITRITE, URINE: NEGATIVE
PDW BLD-RTO: 13.1 % (ref 12.4–15.4)
PH UA: 7.5 (ref 5–8)
PLATELET # BLD: 194 K/UL (ref 135–450)
PMV BLD AUTO: 9 FL (ref 5–10.5)
POTASSIUM REFLEX MAGNESIUM: 4.3 MMOL/L (ref 3.5–5.1)
PROTEIN UA: NEGATIVE MG/DL
PROTHROMBIN TIME: 10.8 SEC (ref 10–13.2)
RBC # BLD: 4.46 M/UL (ref 4–5.2)
SODIUM BLD-SCNC: 136 MMOL/L (ref 136–145)
SPECIFIC GRAVITY UA: 1.01 (ref 1–1.03)
TOTAL PROTEIN: 6.9 G/DL (ref 6.4–8.2)
TROPONIN: <0.01 NG/ML
URINE REFLEX TO CULTURE: NORMAL
URINE TYPE: NORMAL
UROBILINOGEN, URINE: 0.2 E.U./DL
WBC # BLD: 8.2 K/UL (ref 4–11)

## 2020-08-25 PROCEDURE — 99285 EMERGENCY DEPT VISIT HI MDM: CPT

## 2020-08-25 PROCEDURE — 85025 COMPLETE CBC W/AUTO DIFF WBC: CPT

## 2020-08-25 PROCEDURE — 84484 ASSAY OF TROPONIN QUANT: CPT

## 2020-08-25 PROCEDURE — 70496 CT ANGIOGRAPHY HEAD: CPT

## 2020-08-25 PROCEDURE — 93005 ELECTROCARDIOGRAM TRACING: CPT | Performed by: EMERGENCY MEDICINE

## 2020-08-25 PROCEDURE — 1200000000 HC SEMI PRIVATE

## 2020-08-25 PROCEDURE — 36415 COLL VENOUS BLD VENIPUNCTURE: CPT

## 2020-08-25 PROCEDURE — 81003 URINALYSIS AUTO W/O SCOPE: CPT

## 2020-08-25 PROCEDURE — 85610 PROTHROMBIN TIME: CPT

## 2020-08-25 PROCEDURE — 6360000002 HC RX W HCPCS: Performed by: EMERGENCY MEDICINE

## 2020-08-25 PROCEDURE — 74176 CT ABD & PELVIS W/O CONTRAST: CPT

## 2020-08-25 PROCEDURE — 6360000004 HC RX CONTRAST MEDICATION: Performed by: EMERGENCY MEDICINE

## 2020-08-25 PROCEDURE — 70450 CT HEAD/BRAIN W/O DYE: CPT

## 2020-08-25 PROCEDURE — 85730 THROMBOPLASTIN TIME PARTIAL: CPT

## 2020-08-25 PROCEDURE — 71045 X-RAY EXAM CHEST 1 VIEW: CPT

## 2020-08-25 PROCEDURE — 6370000000 HC RX 637 (ALT 250 FOR IP): Performed by: EMERGENCY MEDICINE

## 2020-08-25 PROCEDURE — C9113 INJ PANTOPRAZOLE SODIUM, VIA: HCPCS | Performed by: EMERGENCY MEDICINE

## 2020-08-25 PROCEDURE — 80053 COMPREHEN METABOLIC PANEL: CPT

## 2020-08-25 RX ORDER — ONDANSETRON 2 MG/ML
4 INJECTION INTRAMUSCULAR; INTRAVENOUS ONCE
Status: COMPLETED | OUTPATIENT
Start: 2020-08-25 | End: 2020-08-25

## 2020-08-25 RX ORDER — PANTOPRAZOLE SODIUM 40 MG/10ML
40 INJECTION, POWDER, LYOPHILIZED, FOR SOLUTION INTRAVENOUS ONCE
Status: COMPLETED | OUTPATIENT
Start: 2020-08-26 | End: 2020-08-25

## 2020-08-25 RX ORDER — ASPIRIN 325 MG
325 TABLET ORAL ONCE
Status: COMPLETED | OUTPATIENT
Start: 2020-08-25 | End: 2020-08-25

## 2020-08-25 RX ORDER — DIPHENHYDRAMINE HYDROCHLORIDE 50 MG/ML
25 INJECTION INTRAMUSCULAR; INTRAVENOUS ONCE
Status: COMPLETED | OUTPATIENT
Start: 2020-08-25 | End: 2020-08-25

## 2020-08-25 RX ORDER — METOCLOPRAMIDE HYDROCHLORIDE 5 MG/ML
10 INJECTION INTRAMUSCULAR; INTRAVENOUS ONCE
Status: COMPLETED | OUTPATIENT
Start: 2020-08-25 | End: 2020-08-25

## 2020-08-25 RX ADMIN — DIPHENHYDRAMINE HYDROCHLORIDE 25 MG: 50 INJECTION, SOLUTION INTRAMUSCULAR; INTRAVENOUS at 23:40

## 2020-08-25 RX ADMIN — ONDANSETRON 4 MG: 2 INJECTION INTRAMUSCULAR; INTRAVENOUS at 23:40

## 2020-08-25 RX ADMIN — PANTOPRAZOLE SODIUM 40 MG: 40 INJECTION, POWDER, FOR SOLUTION INTRAVENOUS at 23:53

## 2020-08-25 RX ADMIN — ASPIRIN 325 MG ORAL TABLET 325 MG: 325 PILL ORAL at 23:40

## 2020-08-25 RX ADMIN — LIDOCAINE HYDROCHLORIDE: 20 SOLUTION ORAL; TOPICAL at 23:53

## 2020-08-25 RX ADMIN — IOPAMIDOL 75 ML: 755 INJECTION, SOLUTION INTRAVENOUS at 22:17

## 2020-08-25 RX ADMIN — METOCLOPRAMIDE HYDROCHLORIDE 10 MG: 5 INJECTION INTRAMUSCULAR; INTRAVENOUS at 23:40

## 2020-08-25 ASSESSMENT — PAIN DESCRIPTION - FREQUENCY: FREQUENCY: CONTINUOUS

## 2020-08-25 ASSESSMENT — PAIN SCALES - GENERAL: PAINLEVEL_OUTOF10: 4

## 2020-08-25 ASSESSMENT — PAIN DESCRIPTION - PAIN TYPE: TYPE: ACUTE PAIN

## 2020-08-25 ASSESSMENT — PAIN DESCRIPTION - LOCATION: LOCATION: HEAD

## 2020-08-25 ASSESSMENT — PAIN SCALES - WONG BAKER: WONGBAKER_NUMERICALRESPONSE: 4

## 2020-08-25 ASSESSMENT — PAIN DESCRIPTION - ORIENTATION: ORIENTATION: LEFT

## 2020-08-26 ENCOUNTER — APPOINTMENT (OUTPATIENT)
Dept: MRI IMAGING | Age: 85
DRG: 069 | End: 2020-08-26
Payer: MEDICARE

## 2020-08-26 LAB
A/G RATIO: 1.7 (ref 1.1–2.2)
ALBUMIN SERPL-MCNC: 3.6 G/DL (ref 3.4–5)
ALP BLD-CCNC: 51 U/L (ref 40–129)
ALT SERPL-CCNC: 7 U/L (ref 10–40)
ANION GAP SERPL CALCULATED.3IONS-SCNC: 12 MMOL/L (ref 3–16)
AST SERPL-CCNC: 18 U/L (ref 15–37)
BASOPHILS ABSOLUTE: 0.1 K/UL (ref 0–0.2)
BASOPHILS RELATIVE PERCENT: 0.6 %
BILIRUB SERPL-MCNC: 0.5 MG/DL (ref 0–1)
BUN BLDV-MCNC: 16 MG/DL (ref 7–20)
CALCIUM SERPL-MCNC: 8.6 MG/DL (ref 8.3–10.6)
CHLORIDE BLD-SCNC: 98 MMOL/L (ref 99–110)
CHOLESTEROL, TOTAL: 174 MG/DL (ref 0–199)
CO2: 22 MMOL/L (ref 21–32)
CREAT SERPL-MCNC: 0.8 MG/DL (ref 0.6–1.2)
EKG ATRIAL RATE: 77 BPM
EKG DIAGNOSIS: NORMAL
EKG P AXIS: 72 DEGREES
EKG P-R INTERVAL: 150 MS
EKG Q-T INTERVAL: 388 MS
EKG QRS DURATION: 78 MS
EKG QTC CALCULATION (BAZETT): 439 MS
EKG R AXIS: 41 DEGREES
EKG T AXIS: 50 DEGREES
EKG VENTRICULAR RATE: 77 BPM
EOSINOPHILS ABSOLUTE: 0 K/UL (ref 0–0.6)
EOSINOPHILS RELATIVE PERCENT: 0.1 %
ESTIMATED AVERAGE GLUCOSE: 111.2 MG/DL
GFR AFRICAN AMERICAN: >60
GFR NON-AFRICAN AMERICAN: >60
GLOBULIN: 2.1 G/DL
GLUCOSE BLD-MCNC: 110 MG/DL (ref 70–99)
HBA1C MFR BLD: 5.5 %
HCT VFR BLD CALC: 34.9 % (ref 36–48)
HDLC SERPL-MCNC: 57 MG/DL (ref 40–60)
HEMOGLOBIN: 12.1 G/DL (ref 12–16)
LDL CHOLESTEROL CALCULATED: 103 MG/DL
LYMPHOCYTES ABSOLUTE: 1.3 K/UL (ref 1–5.1)
LYMPHOCYTES RELATIVE PERCENT: 15 %
MCH RBC QN AUTO: 31.4 PG (ref 26–34)
MCHC RBC AUTO-ENTMCNC: 34.6 G/DL (ref 31–36)
MCV RBC AUTO: 90.6 FL (ref 80–100)
MONOCYTES ABSOLUTE: 0.6 K/UL (ref 0–1.3)
MONOCYTES RELATIVE PERCENT: 6.8 %
NEUTROPHILS ABSOLUTE: 6.5 K/UL (ref 1.7–7.7)
NEUTROPHILS RELATIVE PERCENT: 77.5 %
PDW BLD-RTO: 13.2 % (ref 12.4–15.4)
PLATELET # BLD: 207 K/UL (ref 135–450)
PMV BLD AUTO: 8.4 FL (ref 5–10.5)
POTASSIUM REFLEX MAGNESIUM: 3.9 MMOL/L (ref 3.5–5.1)
RBC # BLD: 3.85 M/UL (ref 4–5.2)
SODIUM BLD-SCNC: 132 MMOL/L (ref 136–145)
TOTAL PROTEIN: 5.7 G/DL (ref 6.4–8.2)
TRIGL SERPL-MCNC: 70 MG/DL (ref 0–150)
VLDLC SERPL CALC-MCNC: 14 MG/DL
WBC # BLD: 8.4 K/UL (ref 4–11)

## 2020-08-26 PROCEDURE — 6360000002 HC RX W HCPCS: Performed by: INTERNAL MEDICINE

## 2020-08-26 PROCEDURE — 80053 COMPREHEN METABOLIC PANEL: CPT

## 2020-08-26 PROCEDURE — 2580000003 HC RX 258: Performed by: INTERNAL MEDICINE

## 2020-08-26 PROCEDURE — 83036 HEMOGLOBIN GLYCOSYLATED A1C: CPT

## 2020-08-26 PROCEDURE — 6370000000 HC RX 637 (ALT 250 FOR IP): Performed by: INTERNAL MEDICINE

## 2020-08-26 PROCEDURE — 97530 THERAPEUTIC ACTIVITIES: CPT

## 2020-08-26 PROCEDURE — 97162 PT EVAL MOD COMPLEX 30 MIN: CPT

## 2020-08-26 PROCEDURE — 85025 COMPLETE CBC W/AUTO DIFF WBC: CPT

## 2020-08-26 PROCEDURE — 97535 SELF CARE MNGMENT TRAINING: CPT

## 2020-08-26 PROCEDURE — 97116 GAIT TRAINING THERAPY: CPT

## 2020-08-26 PROCEDURE — 1200000000 HC SEMI PRIVATE

## 2020-08-26 PROCEDURE — 80061 LIPID PANEL: CPT

## 2020-08-26 PROCEDURE — 93010 ELECTROCARDIOGRAM REPORT: CPT | Performed by: INTERNAL MEDICINE

## 2020-08-26 PROCEDURE — 70551 MRI BRAIN STEM W/O DYE: CPT

## 2020-08-26 PROCEDURE — 97165 OT EVAL LOW COMPLEX 30 MIN: CPT

## 2020-08-26 RX ORDER — LABETALOL HYDROCHLORIDE 5 MG/ML
10 INJECTION, SOLUTION INTRAVENOUS EVERY 10 MIN PRN
Status: DISCONTINUED | OUTPATIENT
Start: 2020-08-26 | End: 2020-08-27 | Stop reason: HOSPADM

## 2020-08-26 RX ORDER — TROSPIUM CHLORIDE 20 MG/1
20 TABLET, FILM COATED ORAL NIGHTLY
Status: DISCONTINUED | OUTPATIENT
Start: 2020-08-26 | End: 2020-08-27 | Stop reason: HOSPADM

## 2020-08-26 RX ORDER — SODIUM CHLORIDE 0.9 % (FLUSH) 0.9 %
10 SYRINGE (ML) INJECTION EVERY 12 HOURS SCHEDULED
Status: DISCONTINUED | OUTPATIENT
Start: 2020-08-26 | End: 2020-08-27 | Stop reason: HOSPADM

## 2020-08-26 RX ORDER — AMLODIPINE BESYLATE 5 MG/1
5 TABLET ORAL NIGHTLY
Status: DISCONTINUED | OUTPATIENT
Start: 2020-08-26 | End: 2020-08-26

## 2020-08-26 RX ORDER — VITAMIN B COMPLEX
1000 TABLET ORAL DAILY
Status: DISCONTINUED | OUTPATIENT
Start: 2020-08-26 | End: 2020-08-27 | Stop reason: HOSPADM

## 2020-08-26 RX ORDER — ONDANSETRON 2 MG/ML
4 INJECTION INTRAMUSCULAR; INTRAVENOUS EVERY 6 HOURS PRN
Status: DISCONTINUED | OUTPATIENT
Start: 2020-08-26 | End: 2020-08-27 | Stop reason: HOSPADM

## 2020-08-26 RX ORDER — SODIUM CHLORIDE 0.9 % (FLUSH) 0.9 %
10 SYRINGE (ML) INJECTION PRN
Status: DISCONTINUED | OUTPATIENT
Start: 2020-08-26 | End: 2020-08-27 | Stop reason: HOSPADM

## 2020-08-26 RX ORDER — ASPIRIN 81 MG/1
81 TABLET ORAL DAILY
Status: DISCONTINUED | OUTPATIENT
Start: 2020-08-26 | End: 2020-08-26 | Stop reason: SDUPTHER

## 2020-08-26 RX ORDER — ASPIRIN 81 MG/1
81 TABLET ORAL DAILY
Status: DISCONTINUED | OUTPATIENT
Start: 2020-08-26 | End: 2020-08-27 | Stop reason: HOSPADM

## 2020-08-26 RX ORDER — LORAZEPAM 2 MG/ML
1 INJECTION INTRAMUSCULAR ONCE
Status: COMPLETED | OUTPATIENT
Start: 2020-08-26 | End: 2020-08-26

## 2020-08-26 RX ORDER — HYDRALAZINE HYDROCHLORIDE 20 MG/ML
10 INJECTION INTRAMUSCULAR; INTRAVENOUS EVERY 6 HOURS PRN
Status: DISCONTINUED | OUTPATIENT
Start: 2020-08-26 | End: 2020-08-27 | Stop reason: HOSPADM

## 2020-08-26 RX ORDER — CETIRIZINE HYDROCHLORIDE 10 MG/1
10 TABLET ORAL DAILY
Status: DISCONTINUED | OUTPATIENT
Start: 2020-08-26 | End: 2020-08-27 | Stop reason: HOSPADM

## 2020-08-26 RX ORDER — PROMETHAZINE HYDROCHLORIDE 25 MG/1
12.5 TABLET ORAL EVERY 6 HOURS PRN
Status: DISCONTINUED | OUTPATIENT
Start: 2020-08-26 | End: 2020-08-27 | Stop reason: HOSPADM

## 2020-08-26 RX ORDER — BISOPROLOL FUMARATE 5 MG/1
10 TABLET ORAL DAILY
Status: DISCONTINUED | OUTPATIENT
Start: 2020-08-26 | End: 2020-08-26 | Stop reason: CLARIF

## 2020-08-26 RX ORDER — 0.9 % SODIUM CHLORIDE 0.9 %
500 INTRAVENOUS SOLUTION INTRAVENOUS PRN
Status: DISCONTINUED | OUTPATIENT
Start: 2020-08-26 | End: 2020-08-27 | Stop reason: HOSPADM

## 2020-08-26 RX ORDER — ASPIRIN 300 MG/1
300 SUPPOSITORY RECTAL DAILY
Status: DISCONTINUED | OUTPATIENT
Start: 2020-08-26 | End: 2020-08-27 | Stop reason: HOSPADM

## 2020-08-26 RX ORDER — AMLODIPINE BESYLATE 5 MG/1
10 TABLET ORAL NIGHTLY
Status: DISCONTINUED | OUTPATIENT
Start: 2020-08-26 | End: 2020-08-27 | Stop reason: HOSPADM

## 2020-08-26 RX ORDER — POTASSIUM CHLORIDE 20 MEQ/1
40 TABLET, EXTENDED RELEASE ORAL PRN
Status: DISCONTINUED | OUTPATIENT
Start: 2020-08-26 | End: 2020-08-27 | Stop reason: HOSPADM

## 2020-08-26 RX ORDER — LANOLIN ALCOHOL/MO/W.PET/CERES
1000 CREAM (GRAM) TOPICAL DAILY
Status: DISCONTINUED | OUTPATIENT
Start: 2020-08-26 | End: 2020-08-27 | Stop reason: HOSPADM

## 2020-08-26 RX ORDER — SOLIFENACIN SUCCINATE 10 MG/1
10 TABLET, FILM COATED ORAL DAILY
Status: DISCONTINUED | OUTPATIENT
Start: 2020-08-26 | End: 2020-08-26 | Stop reason: CLARIF

## 2020-08-26 RX ORDER — METOPROLOL TARTRATE 50 MG/1
50 TABLET, FILM COATED ORAL 2 TIMES DAILY
Status: DISCONTINUED | OUTPATIENT
Start: 2020-08-26 | End: 2020-08-27 | Stop reason: HOSPADM

## 2020-08-26 RX ORDER — POTASSIUM CHLORIDE 7.45 MG/ML
10 INJECTION INTRAVENOUS PRN
Status: DISCONTINUED | OUTPATIENT
Start: 2020-08-26 | End: 2020-08-27 | Stop reason: HOSPADM

## 2020-08-26 RX ADMIN — ASPIRIN 81 MG: 81 TABLET, COATED ORAL at 10:02

## 2020-08-26 RX ADMIN — CETIRIZINE HYDROCHLORIDE 10 MG: 10 TABLET, FILM COATED ORAL at 10:02

## 2020-08-26 RX ADMIN — METOPROLOL TARTRATE 50 MG: 50 TABLET, FILM COATED ORAL at 10:02

## 2020-08-26 RX ADMIN — Medication 10 ML: at 10:03

## 2020-08-26 RX ADMIN — Medication 10 ML: at 21:45

## 2020-08-26 RX ADMIN — TROSPIUM CHLORIDE 20 MG: 20 TABLET, FILM COATED ORAL at 21:44

## 2020-08-26 RX ADMIN — LORAZEPAM 1 MG: 2 INJECTION INTRAMUSCULAR; INTRAVENOUS at 12:10

## 2020-08-26 RX ADMIN — CYANOCOBALAMIN TAB 1000 MCG 1000 MCG: 1000 TAB at 10:02

## 2020-08-26 RX ADMIN — Medication 1000 UNITS: at 10:02

## 2020-08-26 RX ADMIN — ENOXAPARIN SODIUM 40 MG: 40 INJECTION SUBCUTANEOUS at 10:01

## 2020-08-26 ASSESSMENT — PAIN SCALES - GENERAL
PAINLEVEL_OUTOF10: 0

## 2020-08-26 ASSESSMENT — PAIN DESCRIPTION - FREQUENCY: FREQUENCY: CONTINUOUS

## 2020-08-26 NOTE — ED NOTES
Bed: 03  Expected date:   Expected time:   Means of arrival:   Comments:  Jose Licea [address], RN  08/25/20 2008

## 2020-08-26 NOTE — PROGRESS NOTES
Occupational Therapy   Occupational Therapy Initial Assessment  Date: 2020   Patient Name: Delia Renteria  MRN: [id number]     : 1935    Date of Service: 2020    Discharge Brenda Mann scored a 18/24 on the AM-PAC ADL Inpatient form. Current research shows that an AM-PAC score of 18 or greater is typically associated with a discharge to the patient's home setting. Based on the patient's AM-PAC score, and their current ADL deficits, it is recommended that the patient have 2-3 sessions per week of Occupational Therapy at d/c to increase the patient's independence. At this time, this patient demonstrates the endurance and safety to discharge home with HOME (home vs OP services) and a follow up treatment frequency of 2-3x/wk. Please see assessment section for further patient specific details. If patient discharges prior to next session this note will serve as a discharge summary. Please see below for the latest assessment towards goals. HOME HEALTH CARE: LEVEL 3 SAFETY     - Initial home health evaluation to occur within 24-48 hours, in patient home   - Therapy evaluations in home within 24-48 hours of discharge; including DME and home safety   - Frontload therapy 5 days, then 3x a week   - Therapy to evaluate if patient has [id number] needs for personal care   -  evaluation within 24-48 hours, includes evaluation of resources and insurance to determine [place], [place], LTC, and Medicaid options        OT Equipment Recommendations  Equipment Needed: No  Other: patient owns needed equipment    Assessment   Performance deficits / Impairments: Decreased functional mobility ; Decreased ADL status; Decreased endurance;Decreased balance;Decreased safe awareness;Decreased cognition;Decreased high-level IADLs  Treatment Diagnosis: Decreased ADLs, IADLs, transfers, mobility associated with TIA  Prognosis: Good;Fair  Decision Making: Low Complexity  OT Education: OT Role;Plan of Care  Patient Education: Eval, discharge recommendations-patient verbalized understanding  REQUIRES OT FOLLOW UP: Yes  Activity Tolerance  Activity Tolerance: Patient Tolerated treatment well;Treatment limited secondary to decreased cognition  Safety Devices  Safety Devices in place: Yes  Type of devices: All fall risk precautions in place;Call light within reach; Patient at risk for falls; Left in chair;Chair alarm in place           Patient Diagnosis(es): The primary encounter diagnosis was Headache, unspecified headache type. Diagnoses of Confusion and Speech disturbance, unspecified type were also pertinent to this visit. has a past medical history of Allergies, Elevated cholesterol, Hypertension, Reflux, and TIA (transient ischemic attack). 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 Treatment Diagnosis: Decreased ADLs, IADLs, transfers, mobility associated with TIA      Restrictions  Restrictions/Precautions  Restrictions/Precautions: Fall Risk(High fall risk)  Required Braces or Orthoses?: No  Position Activity Restriction  Other position/activity restrictions: Carlos Dash is a 80 y.o. female who presents because of altered mental state. She does have a history of TIAs. She was fine earlier in the day. She took a nap at about 4 pm.  When she woke up at 6 pm, she was not acting normally. Instead of stating that something was in the refrigerator, she pointed out it. She complained of a headache. Her daughter gave her Tylenol for pain. She subsequently started to feel worse. Her daughter says she has an extremely sensitive stomach. She seemed very confused at home. Her daughter was trying to do some neurologic testing and she did not seem to understand or perform what was asked. On arrival, she says \"I need to be sick\".   She Equipment: Hand-held shower, Shower chair  Bathroom Accessibility: Accessible  Home Equipment: Rolling walker  ADL Assistance: Independent  Homemaking Assistance: Independent  Ambulation Assistance: Independent  Transfer Assistance: Independent  Active : No  Patient's  Info:  drives pt as needed  Mode of Transportation: Truck  Occupation: Retired  Leisure & Hobbies: shopping, spending time with family  Additional Comments: Pt reports no falls in the last 6 months. Objective   Vision: Within Functional Limits  Hearing: Within functional limits    Orientation  Overall Orientation Status: Within Functional Limits     Balance  Sitting Balance: Supervision  Standing Balance: Minimal assistance(CGA to Keshav without device or HHA, CGA with HHA, close SBA with RW (cues for safety). Pt with decreased insight into need for AD)  Wheelchair Bed Transfers  Wheelchair/Bed - Technique: Ambulating  Equipment Used: Bed;Other  Level of Asssistance: Contact guard assistance;Stand by assistance  ADL  Feeding: None  Grooming: Setup  LE Dressing: Stand by assistance(seated to reach/adjust footwear)  Toileting: None(Patient with depends and purwick upon arrival. End of evaluation, purwic removed (RN aware) as patient mobile, device out of proper place following mobility)  Tone RUE  RUE Tone: Normotonic  Tone LUE  LUE Tone: Normotonic  Coordination  Movements Are Fluid And Coordinated: Yes     Bed mobility  Rolling to Left: Supervision  Supine to Sit: Supervision  Scooting: Supervision  Comment: HOB elevated, use of side rail  Transfers  Sit to stand: Contact guard assistance  Stand to sit: Contact guard assistance  Vision - Basic Assessment  Patient Visual Report: No visual complaint reported. Visual Field Cut: No  Oculo Motor Control:  WNL  Vision Comments: Chart review revealed possible visual deficits, testing tracking/scanning, peripheral vision intact  Cognition  Overall Cognitive Status: Exceptions  Arousal/Alertness: Delayed responses to stimuli  Following Commands: Follows one step commands with repetition; Follows one step commands with increased time  Attention Span: Attends with cues to redirect  Memory: Decreased short term memory;Decreased recall of recent events;Decreased recall of precautions;Decreased recall of biographical Information  Safety Judgement: Decreased awareness of need for safety  Problem Solving: Decreased awareness of errors  Insights: Decreased awareness of deficits  Initiation: Requires cues for some  Sequencing: Requires cues for some  Perception  Overall Perceptual Status: WFL     Sensation  Overall Sensation Status: Impaired(Pt has numbness/tingling in her RLE that is always present, however occasionally travels proximally up her LE, but never beyond her knee.  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, which was confirmed in her chart.)        LUE AROM (degrees)  LUE AROM : WFL  RUE AROM (degrees)  RUE AROM : WFL  LUE Strength  L Hand General: 5/5  RUE Strength  R Hand General: 5/5                   Plan   Plan  Times per week: 3-5  Times per day: Daily  Current Treatment Recommendations: Strengthening, Balance Training, Functional Mobility Training, Endurance Training, Safety Education & Training, Self-Care / ADL, Patient/Caregiver Education & Training, Home Management Training      AM-PAC Score        AM-[facility] Inpatient Daily Activity Raw Score: 18 (08/26/20 1525)  AM-PAC Inpatient ADL T-Scale Score : 38.66 (08/26/20 1525)  ADL Inpatient CMS 0-100% Score: 46.65 (08/26/20 1525)  ADL Inpatient CMS G-Code Modifier : CK (08/26/20 1525)    Goals  Short term goals  Time Frame for Short term goals: Discharge  Short term goal 1: Functional ADl transfers supervision  Short term goal 2: Functional mobility supervision with RW  Short term goal 3: UB ADLs setup, LB supervision  Long term goals  Time Frame for Long term goals : LTG=STG  Patient Goals   Patient goals : Home       Therapy Time   Individual Concurrent Group Co-treatment   Time In 0830         Time Out 0929         Minutes 59              Timed Code Treatment Minutes:   44    Total Treatment Minutes:  Mona Guillen 87, OTR/L -[phone]

## 2020-08-26 NOTE — H&P
History and Physical  Dr. Jose Angel Betts  8/26/2020    PCP: Mery Abdalla MD    Cc:   Chief Complaint   Patient presents with   Barnetta Eyad Cerebrovascular Accident     Pt having difficulty putting words together. BP elevated at home. pt presents with headache. HPI:  Esau Evans is a 80 y.o. female who has a past medical history of Allergies, Elevated cholesterol, Hypertension, Reflux, and TIA (transient ischemic attack). Patient presents with Altered mental state. HPI     I am unable to get much hx from patient at this time    From ER note, Dr TURNER [facility] [place]    \"80 y.o. female who presents because of altered mental state. She does have a history of TIAs. She was fine earlier in the day. She took a nap at about 4 pm.  When she woke up at 6 pm, she was not acting normally. Instead of stating that something was in the refrigerator, she pointed out it. She complained of a headache. Her daughter gave her Tylenol for pain. She subsequently started to feel worse. Her daughter says she has an extremely sensitive stomach. She seemed very confused at home. Her daughter was trying to do some neurologic testing and she did not seem to understand or perform what was asked. On arrival, she says \"I need to be sick\". She subsequently vomited large amounts. She was too ill with clutching the emesis bag to participate in full NIH testing while in [place] where she went directly on arrival.  EMS providers did not perform NIH testing en route but did call a stroke alert as daughter had reported to them a history of TIAs. The patient does have a history of severe migraines. Her daughter says is been about 20-30 years since she has had a very severe one. \"         Problem list of hospitalization thus far:   Active Hospital Problems    Diagnosis    Altered mental state [R41.82]    OAB (overactive bladder) [N32.81]    TIA (transient ischemic attack) [G45.9]    Hyperparathyroidism ([address] [E21.3]    Hypertensive disorder [I10]         Review of Systems: (1 system for EPF, 2-9 for detailed, 10+ for comprehensive)  Review of Systems   Unable to perform ROS: Mental status change           Past Medical History:   Past Medical History:   Diagnosis Date    Allergies     Elevated cholesterol     Hypertension     Reflux     TIA (transient ischemic attack)        Past Surgical History:   Past Surgical History:   Procedure Laterality Date    COLONOSCOPY      BX OR SECAL POLYP    CYSTOSCOPY      EXCISION OF PARATHYROID MASS  05/11/2017    EXCISION OF LEFT PARATHYROID ADENOMA WITH FROZEN SECTION    HEMORRHOID SURGERY      JOINT REPLACEMENT Right     KNEE    KIDNEY STONE SURGERY      SKIN CANCER EXCISION         Social History:   Social History     Tobacco History     Smoking Status  Never Smoker    Smokeless Tobacco Use  Never Used          Alcohol History     Alcohol Use Status  No          Drug Use     Drug Use Status  No          Sexual Activity     Sexually Active  Not Asked                Fam History:   Family History   Problem Relation Age of Onset    Cancer Mother         breast       PFSH: The above PMHx, PSHx, SocHx, FamHx has been reviewed by myself. (1 area for detailed, 2-3 for comprehensive)      Code Status: Full Code    Meds - following list of home medications fromelectronic chart has been reviewed by myself  Prior to Admission medications    Medication Sig Start Date End Date Taking? Authorizing Provider   aspirin 81 MG EC tablet Take 1 tablet by mouth daily 6/19/20  Yes Doyle Graff MD   vitamin B-12 (CYANOCOBALAMIN) 1000 MCG tablet Take 1,000 mcg by mouth daily   Yes Historical Provider, MD   bisoprolol (ZEBETA) 10 MG tablet Take 10 mg by mouth daily   Yes Historical Provider, MD   amLODIPine (NORVASC) 5 MG tablet Take 10 mg by mouth nightly  1/14/17  Yes Historical Provider, MD   solifenacin (VESICARE) 10 MG tablet Take 10 mg by mouth daily.    Yes Historical Provider, MD   Cholecalciferol (VITAMIN D) 1000 UNIT CAPS Take 1,000 Units by mouth daily. Yes Historical Provider, MD   cetirizine (ZYRTEC) 10 MG tablet Take 10 mg by mouth daily. Yes Historical Provider, MD         Allergies   Allergen Reactions    Benzonatate Hives    Ciprofloxacin      Leg swelling    Clarithromycin Hives    Cortisone     Prednisone      Facial swelling and redness    Sulfa Antibiotics Rash, Itching and Hives             EXAM: (2-7 system for EPF/Detailed, ?8 for Comprehensive)  BP (!) 109/57   Pulse 65   Temp 98.7 °F (37.1 °C) (Temporal)   Resp 17   Ht 5' 4\" (1.626 m)   Wt 130 lb 8 oz (59.2 kg)   SpO2 92%   BMI 22.40 kg/m²   Constitutional: vitals as above: appears stated age  Psychiatric: normal insight and judgment, oriented to person, place, time, and general circumstances  Head: Normocephalic, without obvious abnormality, atraumatic  Eyes:lids and lashes normal, conjunctivae and sclerae normal and pupils equal, round, reactive to light and accomodation  EMNT: external ears normal, lips normal  Neck: no adenopathy, supple, symmetrical, trachea midline and thyroid not enlarged, symmetric, no tenderness/mass/nodules   Respiratory: clear to auscultation without wheezes or rales  Cardiovascular: normal rate, regular rhythm, normal S1 and S2 and no gallops  Gastrointestinal: soft, non-tender, non-distended, normal bowel sounds, no masses or organomegaly  Lymphatic:   Extremities: no edema, no clubbing  Skin:No rashes or nodules noted.   Neurologic:    LABS:  Labs Reviewed   COMPREHENSIVE METABOLIC PANEL W/ REFLEX TO MG FOR LOW K - Abnormal; Notable for the following components:       Result Value    Glucose 118 (*)     All other components within normal limits    Narrative:     Performed at:  [facility] [address], [facility], [address]   Phone [phone]   COMPREHENSIVE METABOLIC PANEL W/ REFLEX TO MG FOR LOW K - Abnormal; Notable for the following components:    Sodium 132 (*)     Chloride 98 (*)     Glucose 110 (*)     Total Protein 5.7 (*)     ALT 7 (*)     All other components within normal limits    Narrative:     Performed at:  [facility]  [address], [place]   Phone [phone]   CBC WITH AUTO DIFFERENTIAL - Abnormal; Notable for the following components:    RBC 3.85 (*)     Hematocrit 34.9 (*)     All other components within normal limits    Narrative:     Performed at:  [facility] [address], [address]   Phone [phone]   CBC WITH AUTO DIFFERENTIAL    Narrative:     Performed at:  [facility] [address], [address]   Phone [phone]   URINE RT REFLEX TO CULTURE    Narrative:     Performed at:  [facility] [address], [address]   Phone [phone]   TROPONIN    Narrative:     Performed at:  [facility] [address], [address]   Phone [phone]   PROTIME-INR    Narrative:     Performed at:  [facility] [address], [address]   Phone [phone]   APTT    Narrative:     Performed at:  [facility]  [address], [place]   Phone [phone]   HEMOGLOBIN A1C   LIPID PANEL         IMAGING:  Imaging results from the ER have been reviewed in the computerized chart. Ct Abdomen Pelvis Wo Contrast Additional Contrast? None    Result Date: 8/25/2020  EXAMINATION: CT OF THE ABDOMEN AND PELVIS WITHOUT CONTRAST 8/25/2020 8:16 pm TECHNIQUE: CT of the abdomen and pelvis was performed without the administration of intravenous contrast. Multiplanar reformatted images are provided for review. Dose modulation, iterative reconstruction, and/or weight based adjustment of the mA/kV was utilized to reduce the radiation dose to as low as reasonably achievable.  COMPARISON: March 2017 HISTORY: ORDERING SYSTEM PROVIDED HISTORY: vomiting, ams TECHNOLOGIST PROVIDED HISTORY: Additional Contrast?->None Reason for exam:->vomiting, ams Reason for Exam: vomiting, ams Acuity: Acute Type of Exam: Initial History of gastroesophageal reflux, hypertension, kidney stones FINDINGS: Motion artifact degrades many of the images. The study is also limited due to lack administration of intravenous contrast. Lower Chest: Calcified bilateral hilar lymph nodes are present. The lung parenchyma is obscured by motion artifact. Septal lines are suspected as well as scattered ground-glass opacity. No effusion. Organs: Noncontrast images of the liver, spleen, visualized pancreas, adrenal glands, kidneys show no acute abnormality. Portions of the kidneys and pancreas are obscured by motion. The gallbladder is visualized. GI/Bowel: Small hiatal hernia is present. No dilated loops of bowel or suspected bowel thickening. There is there are few scattered colonic diverticula without evidence of active inflammation. There the Pelvis: The uterus and urinary bladder show no acute abnormality. Small calcified uterine fibroids are evident. Peritoneum/Retroperitoneum: The aorta is normal in caliber and course. Bones/Soft Tissues: Bones visualized appear demineralized and show degenerative changes. No acute bony abnormality. Small right pleural effusion with bibasilar septal lines and ground-glass opacity. Correlation for edema is recommended. No acute noncontrast abdominopelvic abnormality. Ct Head Wo Contrast    Result Date: 8/25/2020  EXAMINATION: CT OF THE HEAD WITHOUT CONTRAST  8/25/2020 8:16 pm TECHNIQUE: CT of the head was performed without the administration of intravenous contrast. Dose modulation, iterative reconstruction, and/or weight based adjustment of the mA/kV was utilized to reduce the radiation dose to as low as reasonably achievable.  COMPARISON: 06/18/2020, 06/17/2020 HISTORY: ORDERING SYSTEM PROVIDED HISTORY: ams TECHNOLOGIST PROVIDED HISTORY: Reason for exam:->ams Has a \"code stroke\" or \"stroke alert\" been called? ->Yes Reason for Exam: ams; stroke like symptoms Acuity: Acute Type of Exam: Initial FINDINGS: BRAIN/VENTRICLES: There is no acute intracranial hemorrhage, mass effect or midline shift. No abnormal extra-axial fluid collection. The gray-white differentiation is maintained without evidence of an acute infarct. There is no evidence of hydrocephalus. Central atrophy and mild chronic white matter disease are again identified. ORBITS: The visualized portion of the orbits demonstrate no acute abnormality. SINUSES: The visualized paranasal sinuses and mastoid air cells demonstrate no acute abnormality. SOFT TISSUES/SKULL:  No acute abnormality of the visualized skull or soft tissues. No acute intracranial abnormality. Findings were discussed with Winsotn Chand at 8:38 pm on 8/25/2020. Xr Chest Portable    Result Date: 8/25/2020  EXAMINATION: ONE XRAY VIEW OF THE CHEST 8/25/2020 8:28 pm COMPARISON: 06/17/2020, CT abdomen today HISTORY: ORDERING SYSTEM PROVIDED HISTORY: vomiting, weakness TECHNOLOGIST PROVIDED HISTORY: Reason for exam:->vomiting, weakness FINDINGS: No acute bony abnormality. The heart size and visualized mediastinal contours are stable. There is bilateral diffuse airspace disease with perihilar prominence as well as indistinctness of the pulmonary vasculature. No effusion is identified. Bilateral airspace disease with perihilar prominence most compatible with edema. Cta Head Neck W Contrast    Addendum Date: 8/26/2020    ADDENDUM: Three-dimensional reconstructed imaging of the head and neck arterial vascular was made available by technologist after submission and original interpretation of exam. No additional findings.      Result Date: 8/26/2020  EXAMINATION: CTA OF THE HEAD AND NECK WITH CONTRAST 8/25/2020 10:15 pm: TECHNIQUE: CTA of the head and neck was performed with the administration of intravenous contrast. Multiplanar reformatted images are provided for review. MIP images are provided for review. Stenosis of the internal carotid arteries measured using NASCET criteria. Dose modulation, iterative reconstruction, and/or weight based adjustment of the mA/kV was utilized to reduce the radiation dose to as low as reasonably achievable. COMPARISON: None. HISTORY: ORDERING SYSTEM PROVIDED HISTORY: ams, speech disturbance TECHNOLOGIST PROVIDED HISTORY: Reason for exam:->ams, speech disturbance Reason for Exam: ams, speech disturbance Acuity: Acute Type of Exam: Initial Relevant Medical/Surgical History: Cerebrovascular Accident (Pt having difficulty putting words together. BP elevated at home. pt presents with headache.) FINDINGS: CTA NECK: AORTIC ARCH/ARCH VESSELS: No dissection or arterial injury. No significant stenosis of the brachiocephalic or subclavian arteries. CAROTID ARTERIES: No dissection, arterial injury, or hemodynamically significant stenosis by NASCET criteria. VERTEBRAL ARTERIES: No dissection, arterial injury, or significant stenosis. SOFT TISSUES: The lung apices are clear. No cervical or superior mediastinal lymphadenopathy. The larynx and pharynx are unremarkable. No acute abnormality of the salivary and thyroid glands. BONES: No acute osseous abnormality. CTA HEAD: ANTERIOR CIRCULATION: Bilateral internal carotid artery cavernous segment atherosclerotic calcification is seen resulting up to approximately 25% bilateral arterial stenosis noted. No further evidence of significant stenosis of the intracranial internal carotid, anterior cerebral, or middle cerebral arteries. No aneurysm. POSTERIOR CIRCULATION: No significant stenosis of the vertebral, basilar, or posterior cerebral arteries. No aneurysm. OTHER: No dural venous sinus thrombosis on this non-dedicated study. BRAIN: No mass effect or midline shift.  No extra-axial fluid collection. The gray-white differentiation is maintained. Mild diffuse decrease in cerebral volume is noted with corresponding prominence of the sulci and ventricles. Ill-defined hypoattenuation is present within cerebral white matter consistent with mild microvascular ischemic disease. Bilateral internal carotid artery cavernous segment atherosclerotic calcification resulting in up to approximately bilateral 25% arterial stenosis. Finding is compatible with 16-49% stenosis per sonographic NASCET index criteria. Otherwise, unremarkable CT angiogram of the head and neck. Mild cerebral white matter chronic microvascular ischemic disease. Mild diffuse cerebral atrophy. RECOMMENDATIONS: For clinical suspicion of acute ischemia, recommend MRI brain with diffusion-weighted imaging for further evaluation. EKG: from ER, interpreted by self, sinus rhythm at 77. No acute st elevation seen. No TWI noted. Comparison made to ekg in old chart dated 6/17/20, appears similar in rate/form          MEDICAL DECISION MAKING:    Principal Problem:    Altered mental state -New Problem to me. Cause unclear. Concern for TIA. I suppose this could also be due to migraines, but this seems less likely. Infectious workup negative so far  Plan: admit. Cont to look for infection. Check MRI  Active Problems:    Hyperparathyroidism ([address] -Established problem. Stable. Plan: Continue present orders/plan. Hypertensive disorder -Established problem. Stable. 109/57  Plan: Pt home BP meds reviewed and will be continued. IV Hydralazine ordered for control of extremely high blood pressures   Will monitor labs to assess Creat/K for possible complications of medications. OAB (overactive bladder) -Established problem. Stable. Plan: stay on home meds    TIA (transient ischemic attack) -Established problem. Stable. Plan: admit, check MRI.  Pt/ot eval          Diagnoses as listed above, designated as new or established and plan outlined for each. Data Reviewed:   (1) Lab tests were reviewed or ordered. (1) Radiology tests were reviewed or ordered. (1) Medical test (Echo, EKG, PFT/travis) were ordered. (1)History was obtained from someone other than patient  (1) Old records  were reviewed - see HPI/MDM for pertinent details if review done. (2) Case wasdiscussed with another health care provider: Dr Rosalia Boxer  (2) Imaging was viewed by myself. Ct head  (2) EKG  was viewed by myself. The patient isbeing placed in inpatient status with the expectation of requiring a hospital stay spanning at least two midnights for care and treatment of the problems noted in the problem list.  This determination is also based on thepatients comorbidities and past medical history, the severity and timing of the signs and symptoms upon presentation.         Electronically signed by: Coni Hatchet 8/26/2020

## 2020-08-26 NOTE — PROGRESS NOTES
Physical Therapy    Facility/Department: [facility] ICU  Initial Assessment    NAME: Ralph Shook  : 1935  MRN: [id number]    Date of Service: 2020    Discharge Recommendations:    Ralph Shook scored a 18/24 on the AM-PAC short mobility form. Current research shows that an AM-PAC score of 18 or greater is typically associated with a discharge to the patient's home setting. Based on the patient's AM-PAC score and their current functional mobility deficits, it is recommended that the patient have 2-3 sessions per week of Physical Therapy at d/c to increase the patient's independence. At this time, this patient demonstrates the endurance and safety to discharge home with home therapy services and a follow up treatment frequency of 2-3x/wk. Please see assessment section for further patient specific details. HOME HEALTH CARE: LEVEL 1 STANDARD    - Initial home health evaluation to occur within 24-48 hours, in patient home   - Therapy to evaluate with goal of regaining prior level of functioning   - Therapy to evaluate if patient has [address] needs for personal care    If patient discharges prior to next session this note will serve as a discharge summary. Please see below for the latest assessment towards goals. PT Equipment Recommendations  Equipment Needed: No    Assessment   Body structures, Functions, Activity limitations: Decreased functional mobility ; Decreased ADL status; Decreased strength;Decreased cognition;Decreased safe awareness;Decreased balance;Decreased endurance;Decreased sensation  Assessment: Pt presents s/p TIA with associated weakness, decreased endurance, decreased balance, and decreased functional mobility. Pt was previously independent with all ADL's and mobility. This date pt required a RW to safely ambulate, however demonstrated poor safety awareness and insight into her current condition.  Pt presents below her baseline and would benefit from continued skilled acute therapy services in order to return to her PLOF. Treatment Diagnosis: weakness, decreased endurance, decreased balance, and decreased functional mobility  Prognosis: Good  Decision Making: Medium Complexity  Clinical Presentation: evolving  PT Education: Goals;PT Role;Plan of Care;Gait Training;General Safety; Functional Mobility Training  Patient Education: Pt educated on d/c recommendations and verbalized understanding. Will likely need reinforcement on the importance of utilizing the RW with mobility. Barriers to Learning: cognition  REQUIRES PT FOLLOW UP: Yes  Activity Tolerance  Activity Tolerance: Patient Tolerated treatment well  Activity Tolerance: Pt's vitals WNL throughout therapy session. Patient Diagnosis(es): The primary encounter diagnosis was Headache, unspecified headache type. Diagnoses of Confusion and Speech disturbance, unspecified type were also pertinent to this visit. has a past medical history of Allergies, Elevated cholesterol, Hypertension, Reflux, and TIA (transient ischemic attack). 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 Restrictions  Restrictions/Precautions  Restrictions/Precautions: Fall Risk(High fall risk)  Required Braces or Orthoses?: No  Position Activity Restriction  Other position/activity restrictions: Caro Pabon is a 80 y.o. female who presents because of altered mental state. She does have a history of TIAs. She was fine earlier in the day. She took a nap at about 4 pm.  When she woke up at 6 pm, she was not acting normally. Instead of stating that something was in the refrigerator, she pointed out it. She complained of a headache. Her daughter gave her Tylenol for pain. She subsequently started to feel worse. Her daughter says she has an extremely sensitive stomach.   She seemed very confused at home.  Her daughter was trying to do some neurologic testing and she did not seem to understand or perform what was asked. On arrival, she says \"I need to be sick\". She subsequently vomited large amounts. She was too ill with clutching the emesis bag to participate in full NIH testing while in [place] where she went directly on arrival.  EMS providers did not perform NIH testing en route but did call a stroke alert as daughter had reported to them a history of TIAs. The patient does have a history of severe migraines. Her daughter says is been about 20-30 years since she has had a very severe one. She was taken off hormone replacement therapy and after that, the migraines resolved. There is a strong family history of migraines. Daughter recalls her having similar behaviors in the past when the patient experienced headaches when she was about 61years old. Vision/Hearing  Vision: Within Functional Limits(Pt has transplants; Pt's visual fields, visual tracking, and peripheral vision were all assessed this date and appeared to be WNL.)  Hearing: Within functional limits     Subjective  General  Chart Reviewed: Yes  Patient assessed for rehabilitation services?: Yes  Family / Caregiver Present: No  Diagnosis: AMS  Follows Commands: Within Functional Limits  General Comment  Comments: Pt supine in bed upon arrival.  Subjective  Subjective: Pt is pleasant and agreeable to therapy.   Pain Screening  Patient Currently in Pain: Denies  Vital Signs  Patient Currently in Pain: Denies       Orientation  Orientation  Overall Orientation Status: Within Functional Limits  Social/Functional History  Social/Functional History  Lives With: Spouse  Type of Home: House  Home Layout: One level, Laundry in basement, Able to Live on Main level with bedroom/bathroom  Home Access: Stairs to enter without rails(1 flight of stairs to get to laundry; 1 rail on the right ascending)  Entrance Stairs - Number of Steps: 2  Bathroom Shower/Tub: Tub/Shower unit, Shower chair with back  Bathroom Toilet: Standard  Bathroom Equipment: Hand-held shower, Shower chair  Bathroom Accessibility: Accessible  Home Equipment: Rolling walker  ADL Assistance: Independent  Homemaking Assistance: Independent  Ambulation Assistance: Independent  Transfer Assistance: Independent  Active : No  Patient's  Info:  drives pt as needed  Mode of Transportation: Truck  Occupation: Retired  Leisure & Hobbies: shopping, spending time with family  Additional Comments: Pt reports no falls in the last 6 months. Cognition   Cognition  Overall Cognitive Status: Exceptions  Arousal/Alertness: Delayed responses to stimuli  Attention Span: Attends with cues to redirect  Memory: Decreased long term memory  Safety Judgement: Decreased awareness of need for safety  Insights: Decreased awareness of deficits    Objective    AROM RLE (degrees)  RLE AROM: WFL  RLE General AROM: WFL as demonstrated by functional mobility  AROM LLE (degrees)  LLE AROM : WFL  LLE General AROM: WFL as demonstrated by functional mobility  Strength RLE  Strength RLE: WFL  Comment: WFL as demonstrated by functional mobility  Strength LLE  Strength LLE: WFL  Comment: WFL as demonstrated by functional mobility  Tone RLE  RLE Tone: Normotonic  Tone LLE  LLE Tone: Normotonic  Motor Control  Gross Motor?: WFL  Sensation  Overall Sensation Status: Impaired(Pt has numbness/tingling in her RLE that is always present, however occasionally travels proximally up her LE, but never beyond her knee.  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 Pt demo'd a deviated path to the left with ambulation. Distance: 30ft  Comments: Pt demonstrated decreased safety requiring min A to maintain balance with ambulation. Ambulation 2  Surface - 2: level tile  Device 2: Rolling Walker(HHA)  Assistance 2: Contact guard assistance;Stand by assistance  Quality of Gait 2: Pt ambulated with a swing through gait pattern untilizing the RW. Pt demonstrated a normalized gait pattern with continued mild scissor on the R. Distance: 30 ft with RW; 30 ft HHA  Comments: Pt was CGA for ambulation with HHA, but only required SBA during ambulation with RW. Pt educated to use RW at home for safety and to reduce risk of falls. Stairs/Curb  Stairs?: No     Balance  Posture: Good  Sitting - Static: Good  Sitting - Dynamic: Good  Standing - Static: Good(Pt was able to maintain static standing balance without UE support)  Standing - Dynamic: Fair(Pt required UE support to maintain dynamic standing balance with ambulation)  Comments: Pt sat EOB for 7-8 minutes during therapy assessment of strength, sensation, and vision. Plan   Plan  Times per week: 5-7x  Times per day: Daily  Current Treatment Recommendations: Strengthening, Balance Training, Functional Mobility Training, Transfer Training, ADL/Self-care Training, Gait Training, Endurance Training, Patient/Caregiver Education & Training, Safety Education & Training, Cognitive/Perceptual Training  Safety Devices  Type of devices:  All fall risk precautions in place, Call light within reach, Chair alarm in place, Gait belt, Patient at risk for falls, Left in chair, Nurse notified  Restraints  Initially in place: No      AM-PAC Score  AM-PAC Inpatient Mobility Raw Score : 18 (08/26/20 1102)  AM-PAC Inpatient T-Scale Score : 43.63 (08/26/20 1102)  Mobility Inpatient CMS 0-100% Score: 46.58 (08/26/20 1102)  Mobility Inpatient CMS G-Code Modifier : CK (08/26/20 1102)          Goals  Short term goals  Time Frame for Short term goals: by d/c  Short term goal 1: Pt will perform bed mobility with HOB flat and without the use of the side rail independently. Short term goal 2: Pt will perform transfers with supervision and utilize the RW as needed. Short term goal 3: Pt will ambulate 270 ft with LRAD and supervision. Patient Goals   Patient goals : to go home       Therapy Time   Individual Concurrent Group Co-treatment   Time In       0830   Time Out       0929   Minutes       59        Timed Code Treatment Minutes:   44    Total Treatment Minutes:  [address] SPT    PT providing direct supervision during session and assisting in making skilled judgements throughout session.   [id number] [facility] PT, DPT [phone]    [id number] [facility], PT

## 2020-08-26 NOTE — CARE COORDINATION
Advance Care Planning     Advance Care Planning Activator (Inpatient)  Conversation Note      Date of ACP Conversation: 8/25/2020    Conversation Conducted with: Patient with Decision Making Capacity    ACP Activator: [address] Decision Maker:     Current Designated Health Care Decision Maker:     Wenceslao Wright - spouse - [phone]    First Alternate Daughter - Matt Pete [phone]    Second Alternate Grandson - Jori Guerra [phone] and Living Will on soft Chart - needs to be scanned into Epic. Patient and family defers conversation to these documents.     Claude Gunther RN BSN  Case Management  [phone]

## 2020-08-26 NOTE — ED NOTES
Bed: 15  Expected date:   Expected time:   Means of arrival:   Comments:  Room [facility]  08/25/20 0747

## 2020-08-26 NOTE — ED PROVIDER NOTES
[id number] [facility] Emergency Department      Pt Name: Man Mohan  MRN: [id number]  [place] 1935  Date of evaluation: 8/25/2020  Provider: Jon Membreno MD  CHIEF COMPLAINT  Chief Complaint   Patient presents with    Cerebrovascular Accident     Pt having difficulty putting words together. BP elevated at home. pt presents with headache. HPI  Man Mohan is a 80 y.o. female who presents because of altered mental state. She does have a history of TIAs. She was fine earlier in the day. She took a nap at about 4 pm.  When she woke up at 6 pm, she was not acting normally. Instead of stating that something was in the refrigerator, she pointed out it. She complained of a headache. Her daughter gave her Tylenol for pain. She subsequently started to feel worse. Her daughter says she has an extremely sensitive stomach. She seemed very confused at home. Her daughter was trying to do some neurologic testing and she did not seem to understand or perform what was asked. On arrival, she says \"I need to be sick\". She subsequently vomited large amounts. She was too ill with clutching the emesis bag to participate in full NIH testing while in [place] where she went directly on arrival.  EMS providers did not perform NIH testing en route but did call a stroke alert as daughter had reported to them a history of TIAs. The patient does have a history of severe migraines. Her daughter says is been about 20-30 years since she has had a very severe one. She was taken off hormone replacement therapy and after that, the migraines resolved. There is a strong family history of migraines. Daughter recalls her having similar behaviors in the past when the patient experienced headaches when she was about 61years old. REVIEW OF SYSTEMS:  See HPI for further details. Remainder of review of systems limited by patient ability to provide history. Nursing notes reviewed.     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 Current Outpatient Medications on File Prior to Encounter   Medication Sig Dispense Refill    aspirin 81 MG EC tablet Take 1 tablet by mouth daily 30 tablet 3    vitamin B-12 (CYANOCOBALAMIN) 1000 MCG tablet Take 1,000 mcg by mouth daily      bisoprolol (ZEBETA) 10 MG tablet Take 10 mg by mouth daily      amLODIPine (NORVASC) 5 MG tablet Take 5 mg by mouth nightly       solifenacin (VESICARE) 10 MG tablet Take 10 mg by mouth daily.  Cholecalciferol (VITAMIN D) 1000 UNIT CAPS Take 1,000 Units by mouth daily.  cetirizine (ZYRTEC) 10 MG tablet Take 10 mg by mouth daily. ALLERGIES  Benzonatate; Ciprofloxacin; Clarithromycin; Cortisone; Prednisone; and Sulfa antibiotics  FAMILY HISTORY  Family History   Problem Relation Age of Onset    Cancer Mother         breast     SOCIAL HISTORY:  Social History     Tobacco Use    Smoking status: Never Smoker    Smokeless tobacco: Never Used   Substance Use Topics    Alcohol use: No    Drug use: No     IMMUNIZATIONS:  Noncontributory    PHYSICAL EXAM  VITAL SIGNS:  Blood pressure (!) 156/74, pulse 74, temperature 99.7 °F (37.6 °C), temperature source Oral, resp. rate 18, height 5' 4\" (1.626 m), weight 130 lb (59 kg), SpO2 93 %, not currently breastfeeding.   Constitutional:  80 y.o. female ill appearing, moaning, pale  HENT:  Atraumatic, mucous membranes moist  Eyes:   Conjunctiva clear, no my efforts to edit errors.)        Joleen Mcdowell MD  08/25/20 6638

## 2020-08-27 VITALS
BODY MASS INDEX: 21.32 KG/M2 | RESPIRATION RATE: 13 BRPM | WEIGHT: 124.9 LBS | SYSTOLIC BLOOD PRESSURE: 156 MMHG | HEART RATE: 69 BPM | OXYGEN SATURATION: 97 % | DIASTOLIC BLOOD PRESSURE: 69 MMHG | TEMPERATURE: 97.4 F | HEIGHT: 64 IN

## 2020-08-27 LAB
A/G RATIO: 1.5 (ref 1.1–2.2)
ALBUMIN SERPL-MCNC: 3.5 G/DL (ref 3.4–5)
ALP BLD-CCNC: 51 U/L (ref 40–129)
ALT SERPL-CCNC: 8 U/L (ref 10–40)
ANION GAP SERPL CALCULATED.3IONS-SCNC: 9 MMOL/L (ref 3–16)
AST SERPL-CCNC: 18 U/L (ref 15–37)
BASOPHILS ABSOLUTE: 0.1 K/UL (ref 0–0.2)
BASOPHILS RELATIVE PERCENT: 1.1 %
BILIRUB SERPL-MCNC: 0.6 MG/DL (ref 0–1)
BUN BLDV-MCNC: 17 MG/DL (ref 7–20)
CALCIUM SERPL-MCNC: 9.1 MG/DL (ref 8.3–10.6)
CHLORIDE BLD-SCNC: 105 MMOL/L (ref 99–110)
CO2: 23 MMOL/L (ref 21–32)
CREAT SERPL-MCNC: 0.8 MG/DL (ref 0.6–1.2)
EOSINOPHILS ABSOLUTE: 0.2 K/UL (ref 0–0.6)
EOSINOPHILS RELATIVE PERCENT: 3.8 %
GFR AFRICAN AMERICAN: >60
GFR NON-AFRICAN AMERICAN: >60
GLOBULIN: 2.4 G/DL
GLUCOSE BLD-MCNC: 190 MG/DL (ref 70–99)
HCT VFR BLD CALC: 38.3 % (ref 36–48)
HEMOGLOBIN: 12.8 G/DL (ref 12–16)
LYMPHOCYTES ABSOLUTE: 1.2 K/UL (ref 1–5.1)
LYMPHOCYTES RELATIVE PERCENT: 23.4 %
MCH RBC QN AUTO: 30.4 PG (ref 26–34)
MCHC RBC AUTO-ENTMCNC: 33.3 G/DL (ref 31–36)
MCV RBC AUTO: 91.1 FL (ref 80–100)
MONOCYTES ABSOLUTE: 0.6 K/UL (ref 0–1.3)
MONOCYTES RELATIVE PERCENT: 10.6 %
NEUTROPHILS ABSOLUTE: 3.2 K/UL (ref 1.7–7.7)
NEUTROPHILS RELATIVE PERCENT: 61.1 %
PDW BLD-RTO: 13.6 % (ref 12.4–15.4)
PLATELET # BLD: 187 K/UL (ref 135–450)
PMV BLD AUTO: 8.9 FL (ref 5–10.5)
POTASSIUM REFLEX MAGNESIUM: 4 MMOL/L (ref 3.5–5.1)
RBC # BLD: 4.2 M/UL (ref 4–5.2)
SODIUM BLD-SCNC: 137 MMOL/L (ref 136–145)
TOTAL PROTEIN: 5.9 G/DL (ref 6.4–8.2)
WBC # BLD: 5.3 K/UL (ref 4–11)

## 2020-08-27 PROCEDURE — 36415 COLL VENOUS BLD VENIPUNCTURE: CPT

## 2020-08-27 PROCEDURE — 85025 COMPLETE CBC W/AUTO DIFF WBC: CPT

## 2020-08-27 PROCEDURE — 2580000003 HC RX 258: Performed by: INTERNAL MEDICINE

## 2020-08-27 PROCEDURE — 6360000002 HC RX W HCPCS: Performed by: INTERNAL MEDICINE

## 2020-08-27 PROCEDURE — 80053 COMPREHEN METABOLIC PANEL: CPT

## 2020-08-27 PROCEDURE — 6370000000 HC RX 637 (ALT 250 FOR IP): Performed by: INTERNAL MEDICINE

## 2020-08-27 RX ADMIN — ENOXAPARIN SODIUM 40 MG: 40 INJECTION SUBCUTANEOUS at 09:26

## 2020-08-27 RX ADMIN — CETIRIZINE HYDROCHLORIDE 10 MG: 10 TABLET, FILM COATED ORAL at 09:26

## 2020-08-27 RX ADMIN — CYANOCOBALAMIN TAB 1000 MCG 1000 MCG: 1000 TAB at 09:27

## 2020-08-27 RX ADMIN — Medication 10 ML: at 09:27

## 2020-08-27 RX ADMIN — ASPIRIN 81 MG: 81 TABLET, COATED ORAL at 09:25

## 2020-08-27 RX ADMIN — METOPROLOL TARTRATE 50 MG: 50 TABLET, FILM COATED ORAL at 09:25

## 2020-08-27 RX ADMIN — Medication 1000 UNITS: at 09:25

## 2020-08-27 ASSESSMENT — PAIN SCALES - WONG BAKER
WONGBAKER_NUMERICALRESPONSE: 0
WONGBAKER_NUMERICALRESPONSE: 0

## 2020-08-27 ASSESSMENT — PAIN SCALES - GENERAL
PAINLEVEL_OUTOF10: 0

## 2020-08-27 NOTE — CARE COORDINATION
Discharge Note:    Karyna Lu services arranged through:    FACILITY:     [facility]  ADDRESS:   [address] [place] [address]   PHONE:        [phone]:              [phone]    Met with patient and her daughter at bedside. No further case management interventions needed.     Elvis Ott RN BSN  Case Management  [phone]

## 2020-08-27 NOTE — PROGRESS NOTES
[facility] home care referral. Spoke with pt's spouse and re: home care plan of care/services. Agreeable. Demographic's verified. Will follow for home care.

## 2020-08-27 NOTE — DISCHARGE SUMMARY
[facility] -- Physician Discharge Summary     Dilshad Lyman  1935  MRN: [id number]    Admit Date: 8/25/2020  Discharge Date: No discharge date for patient encounter. Attending MD: Gerda Colby MD  Discharging MD: Gerda Colby MD  PCP: Gaurav Carrillo MD [address] / Daksha Fernández [phone]    Admission Diagnosis: TIA (transient ischemic attack) [G45.9]  TIA (transient ischemic attack) [G45.9]  DISCHARGE DIAGNOSIS: same    Full Hospital Problem List:  Active Hospital Problems    Diagnosis Date Noted    Altered mental state [R41.82] 08/25/2020    OAB (overactive bladder) [N32.81] 06/17/2020    TIA (transient ischemic attack) [G45.9] 06/17/2020    Hyperparathyroidism ([address] [E21.3] 03/30/2017    Hypertensive disorder [I10] 12/13/2016           Hospital Course:  80 y. o. female who presents because of altered mental state.  She does have a history of TIAs.  She was fine earlier in the day.  She took a nap at about 4 pm.  When she woke up at 6 pm, she was not acting normally. Sandria Skates of stating that something was in the refrigerator, she pointed out it.  She complained of a headache.  Her daughter gave her Tylenol for pain.  She subsequently started to feel worse. Didier Coburn daughter says she has an extremely sensitive stomach.  She seemed very confused at home. Didier Coburn daughter was trying to do some neurologic testing and she did not seem to understand or perform what was asked.  On arrival, she says \"I need to be sick\".  She subsequently vomited large amounts.  She was too ill with clutching the emesis bag to participate in full NIH testing while in [place] where she went directly on arrival.  EMS providers did not perform NIH testing en route but did call a stroke alert as daughter had reported to them a history of TIAs.   The patient does have a history of severe migraines.  Her daughter says is been about 20-30 years since she has had a very severe one.     Mentation improves while here  MRI is done to r/o CVA   Impression:         1. No acute intracranial abnormality. 2. Mild chronic white matter microvascular ischemic changes. Pt appears to be back to baseline  PT/OT to be set up at home    Consults made during Hospitalization:  [address] [place] [address]    Treatment team at time of Discharge: Treatment Team: Attending Provider: Adrian Gill MD; Consulting Physician: Adrian Gill MD; Respiratory Therapist (Day): Phyllis Sun RCP; Registered Nurse: Cornelio Thao RN; Utilization Reviewer: April Castano RN    Imaging Results:  Ct Abdomen Pelvis Wo Contrast Additional Contrast? None    Result Date: 8/25/2020  EXAMINATION: CT OF THE ABDOMEN AND PELVIS WITHOUT CONTRAST 8/25/2020 8:16 pm TECHNIQUE: CT of the abdomen and pelvis was performed without the administration of intravenous contrast. Multiplanar reformatted images are provided for review. Dose modulation, iterative reconstruction, and/or weight based adjustment of the mA/kV was utilized to reduce the radiation dose to as low as reasonably achievable. COMPARISON: March 2017 HISTORY: ORDERING SYSTEM PROVIDED HISTORY: vomiting, ams TECHNOLOGIST PROVIDED HISTORY: Additional Contrast?->None Reason for exam:->vomiting, ams Reason for Exam: vomiting, ams Acuity: Acute Type of Exam: Initial History of gastroesophageal reflux, hypertension, kidney stones FINDINGS: Motion artifact degrades many of the images. The study is also limited due to lack administration of intravenous contrast. Lower Chest: Calcified bilateral hilar lymph nodes are present. The lung parenchyma is obscured by motion artifact. Septal lines are suspected as well as scattered ground-glass opacity. No effusion. Organs: Noncontrast images of the liver, spleen, visualized pancreas, adrenal glands, kidneys show no acute abnormality. Portions of the kidneys and pancreas are obscured by motion.   The gallbladder is visualized. GI/Bowel: Small hiatal hernia is present. No dilated loops of bowel or suspected bowel thickening. There is there are few scattered colonic diverticula without evidence of active inflammation. There the Pelvis: The uterus and urinary bladder show no acute abnormality. Small calcified uterine fibroids are evident. Peritoneum/Retroperitoneum: The aorta is normal in caliber and course. Bones/Soft Tissues: Bones visualized appear demineralized and show degenerative changes. No acute bony abnormality. Small right pleural effusion with bibasilar septal lines and ground-glass opacity. Correlation for edema is recommended. No acute noncontrast abdominopelvic abnormality. Ct Head Wo Contrast    Result Date: 8/25/2020  EXAMINATION: CT OF THE HEAD WITHOUT CONTRAST  8/25/2020 8:16 pm TECHNIQUE: CT of the head was performed without the administration of intravenous contrast. Dose modulation, iterative reconstruction, and/or weight based adjustment of the mA/kV was utilized to reduce the radiation dose to as low as reasonably achievable. COMPARISON: 06/18/2020, 06/17/2020 HISTORY: ORDERING SYSTEM PROVIDED HISTORY: ams TECHNOLOGIST PROVIDED HISTORY: Reason for exam:->ams Has a \"code stroke\" or \"stroke alert\" been called? ->Yes Reason for Exam: ams; stroke like symptoms Acuity: Acute Type of Exam: Initial FINDINGS: BRAIN/VENTRICLES: There is no acute intracranial hemorrhage, mass effect or midline shift. No abnormal extra-axial fluid collection. The gray-white differentiation is maintained without evidence of an acute infarct. There is no evidence of hydrocephalus. Central atrophy and mild chronic white matter disease are again identified. ORBITS: The visualized portion of the orbits demonstrate no acute abnormality. SINUSES: The visualized paranasal sinuses and mastoid air cells demonstrate no acute abnormality. SOFT TISSUES/SKULL:  No acute abnormality of the visualized skull or soft tissues.      No acute intracranial abnormality. Findings were discussed with Jamilah Jhaveri at 8:38 pm on 8/25/2020. Xr Chest Portable    Result Date: 8/25/2020  EXAMINATION: ONE XRAY VIEW OF THE CHEST 8/25/2020 8:28 pm COMPARISON: 06/17/2020, CT abdomen today HISTORY: ORDERING SYSTEM PROVIDED HISTORY: vomiting, weakness TECHNOLOGIST PROVIDED HISTORY: Reason for exam:->vomiting, weakness FINDINGS: No acute bony abnormality. The heart size and visualized mediastinal contours are stable. There is bilateral diffuse airspace disease with perihilar prominence as well as indistinctness of the pulmonary vasculature. No effusion is identified. Bilateral airspace disease with perihilar prominence most compatible with edema. Cta Head Neck W Contrast    Addendum Date: 8/26/2020    ADDENDUM: Three-dimensional reconstructed imaging of the head and neck arterial vascular was made available by technologist after submission and original interpretation of exam. No additional findings. Result Date: 8/26/2020  EXAMINATION: CTA OF THE HEAD AND NECK WITH CONTRAST 8/25/2020 10:15 pm: TECHNIQUE: CTA of the head and neck was performed with the administration of intravenous contrast. Multiplanar reformatted images are provided for review. MIP images are provided for review. Stenosis of the internal carotid arteries measured using NASCET criteria. Dose modulation, iterative reconstruction, and/or weight based adjustment of the mA/kV was utilized to reduce the radiation dose to as low as reasonably achievable. COMPARISON: None. HISTORY: ORDERING SYSTEM PROVIDED HISTORY: ams, speech disturbance TECHNOLOGIST PROVIDED HISTORY: Reason for exam:->ams, speech disturbance Reason for Exam: ams, speech disturbance Acuity: Acute Type of Exam: Initial Relevant Medical/Surgical History: Cerebrovascular Accident (Pt having difficulty putting words together. BP elevated at home.  pt presents with headache.) FINDINGS: CTA NECK: AORTIC ARCH/ARCH VESSELS: No dissection or arterial injury. No significant stenosis of the brachiocephalic or subclavian arteries. CAROTID ARTERIES: No dissection, arterial injury, or hemodynamically significant stenosis by NASCET criteria. VERTEBRAL ARTERIES: No dissection, arterial injury, or significant stenosis. SOFT TISSUES: The lung apices are clear. No cervical or superior mediastinal lymphadenopathy. The larynx and pharynx are unremarkable. No acute abnormality of the salivary and thyroid glands. BONES: No acute osseous abnormality. CTA HEAD: ANTERIOR CIRCULATION: Bilateral internal carotid artery cavernous segment atherosclerotic calcification is seen resulting up to approximately 25% bilateral arterial stenosis noted. No further evidence of significant stenosis of the intracranial internal carotid, anterior cerebral, or middle cerebral arteries. No aneurysm. POSTERIOR CIRCULATION: No significant stenosis of the vertebral, basilar, or posterior cerebral arteries. No aneurysm. OTHER: No dural venous sinus thrombosis on this non-dedicated study. BRAIN: No mass effect or midline shift. No extra-axial fluid collection. The gray-white differentiation is maintained. Mild diffuse decrease in cerebral volume is noted with corresponding prominence of the sulci and ventricles. Ill-defined hypoattenuation is present within cerebral white matter consistent with mild microvascular ischemic disease. Bilateral internal carotid artery cavernous segment atherosclerotic calcification resulting in up to approximately bilateral 25% arterial stenosis. Finding is compatible with 16-49% stenosis per sonographic NASCET index criteria. Otherwise, unremarkable CT angiogram of the head and neck. Mild cerebral white matter chronic microvascular ischemic disease. Mild diffuse cerebral atrophy. RECOMMENDATIONS: For clinical suspicion of acute ischemia, recommend MRI brain with diffusion-weighted imaging for further evaluation.      Mri Brain Without Contrast    Result Date: 8/26/2020  EXAMINATION: MRI OF THE BRAIN WITHOUT CONTRAST  8/26/2020 1:25 pm TECHNIQUE: Multiplanar multisequence MRI of the brain was performed without the administration of intravenous contrast. COMPARISON: CT head 1 day prior. HISTORY: ORDERING SYSTEM PROVIDED HISTORY: TIA TECHNOLOGIST PROVIDED HISTORY: Reason for exam:->TIA Reason for Exam: TIA, headache, trouble finding words Acuity: Acute Type of Exam: Initial FINDINGS: INTRACRANIAL STRUCTURES/VENTRICLES: There is no acute infarct or mass. Diffuse parenchymal volume loss is noted. Mild chronic white matter microvascular ischemic changes are present. No mass effect or midline shift. No evidence of an acute intracranial hemorrhage. The ventricles and sulci are normal in size and configuration. The sellar/suprasellar regions appear unremarkable. The normal signal voids within the major intracranial vessels appear maintained. ORBITS: Bilateral intra-ocular lens implants. Otherwise, normal. SINUSES: The visualized paranasal sinuses and mastoid air cells are well aerated. BONES/SOFT TISSUES: The bone marrow signal intensity appears normal. The soft tissues demonstrate no acute abnormality. 1. No acute intracranial abnormality. 2. Mild chronic white matter microvascular ischemic changes.          Discharge Exam:  BP (!) 127/56   Pulse 53   Temp 97.5 °F (36.4 °C) (Temporal)   Resp 13   Ht 5' 4\" (1.626 m)   Wt 124 lb 14.4 oz (56.7 kg)   SpO2 95%   BMI 21.44 kg/m²   General appearance: alert, appears stated age and cooperative  Head: Normocephalic, without obvious abnormality, atraumatic  Lungs: clear to auscultation bilaterally  Heart: regular rate and rhythm, S1, S2 normal, no murmur, click, rub or gallop  Abdomen: soft, non-tender; bowel sounds normal; no masses,  no organomegaly  Extremities: extremities normal, atraumatic, no cyanosis or edema    Disposition: home    Condition: stable    Discharge Medications: [place], [address] Medication Instructions ROSA:[id number]    Printed on:08/27/20 4873   Medication Information                      amLODIPine (NORVASC) 5 MG tablet  Take 10 mg by mouth nightly              aspirin 81 MG EC tablet  Take 1 tablet by mouth daily             bisoprolol (ZEBETA) 10 MG tablet  Take 10 mg by mouth daily             cetirizine (ZYRTEC) 10 MG tablet  Take 10 mg by mouth daily. Cholecalciferol (VITAMIN D) 1000 UNIT CAPS  Take 1,000 Units by mouth daily. solifenacin (VESICARE) 10 MG tablet  Take 10 mg by mouth daily. vitamin B-12 (CYANOCOBALAMIN) 1000 MCG tablet  Take 1,000 mcg by mouth daily                 Allergies: Allergies   Allergen Reactions    Benzonatate Hives    Ciprofloxacin      Leg swelling    Clarithromycin Hives    Cortisone     Prednisone      Facial swelling and redness    Sulfa Antibiotics Rash, Itching and Hives       Follow up Instructions: Follow-up with PCP: Socorro Dillon MD in 2 wk .       Total time spent on day of discharge including face-to-face visit, examination, documentation, counseling, preparation of discharge plans and followup, and discharge medicine reconciliation and presciptions is 34 minutes    Signed:  Oc Monique MD  8/27/2020

## 2020-08-27 NOTE — PROGRESS NOTES
Shift assessment completed, see flowsheets. Medications administered, see MAR. Patient resting in bed and able to reposition self. Fall precautions in place. Call light and bedside table within reach. Nonskid footwear on. Pt denies further needs at this time. Will continue to monitor.   Puja Chew RN

## 2020-08-27 NOTE — PROGRESS NOTES
Shift assessment done, see flow sheet. Medication administrated per MAR. The care plan and education reviewed and mutually agree upon with the patient. Patient denies pain and needs at this time. Discharge orders acknowledged, will communicate with daughter about it.  Will continue to monitor

## 2020-08-27 NOTE — DISCHARGE INSTR - COC
Continuity of Care Form    Patient Name: Noel Baer   :  1935  MRN:  [id number]    Admit date:  2020  Discharge date:  2020    Code Status Order: Full Code   Advance Directives:   [facility] Documentation     Date/Time Healthcare Directive Type of Healthcare Directive Copy in [address] [facility] [address] Agent's Name Healthcare Agent's Phone Number    [phone]  Yes, patient has an advance directive for healthcare treatment  Living will;Durable power of  for health care  Yes, copy in chart  Healthcare power of   Arcelia 1st, Pamela Felix 2nd  --          Admitting Physician:  Ovidio Irwin MD  PCP: Emelia Torres MD    Discharging Nurse: Donny Roland [facility] Unit/Room#: BIV-3706/1173-15  Discharging Unit Phone Number: [phone]    Emergency Contact:   Extended Emergency Contact Information  Primary Emergency Contact: Ashkan Gaffney  Address: [address]  Home Phone: [phone]  Work Phone: [phone]  Relation: Spouse  Secondary Emergency Contact: Padmini Barrientos  Mobile Phone: [phone]  Relation: Child    Past Surgical History:  Past Surgical History:   Procedure Laterality Date    COLONOSCOPY      BX OR SECAL POLYP    CYSTOSCOPY      EXCISION OF PARATHYROID MASS  2017    EXCISION OF LEFT PARATHYROID ADENOMA WITH FROZEN SECTION    HEMORRHOID SURGERY      JOINT REPLACEMENT Right     KNEE    KIDNEY STONE SURGERY      SKIN CANCER EXCISION         Immunization History:   Immunization History   Administered Date(s) Administered    Influenza Vaccine, unspecified formulation 10/01/2012, 10/03/2013, 10/02/2014    Influenza Virus Vaccine 10/05/2015, 2016    Influenza Whole 2009    Pneumococcal Polysaccharide ([id number]) 10/01/1997       Active Problems:  Patient Active Problem List   Diagnosis Code    Parathyroid adenoma D35.1    Hyperparathyroidism ([address] E21.3    Multinodular goiter E04.2    Hypertensive disorder I10    OAB (overactive bladder) N32.81    TIA (transient ischemic attack) G45.9    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    History of cardiovascular disorder Z86.79    Anxiety F41.9    Gastroesophageal reflux disease without esophagitis K21.9    History of arthritis Z87.39    Hydronephrosis N13.30    Mixed hyperlipidemia E78.2    Primary osteoarthritis of right knee M17.11    Pure hypercholesterolemia E78.00    Senile osteoporosis M81.0    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    Urge incontinence N39.41    Dysphagia R13.10    Altered mental state R41.82       Isolation/Infection:   Isolation          No Isolation        Patient Infection Status     None to display          Nurse Assessment:  Last Vital Signs: BP (!) 127/56   Pulse 53   Temp 97.5 °F (36.4 °C) (Temporal)   Resp 13   Ht 5' 4\" (1.626 m)   Wt 124 lb 14.4 oz (56.7 kg)   SpO2 95%   BMI 21.44 kg/m²     Last documented pain score (0-10 scale): Pain Level: 0  Last Weight:   Wt Readings from Last 1 Encounters:   08/27/20 124 lb 14.4 oz (56.7 kg)     Mental Status:  {IP PT MENTAL STATUS:20030}    IV Access:  - None    Nursing Mobility/ADLs:  Walking   Assisted  Transfer  Assisted  Bathing  Assisted  Dressing  Assisted  Toileting  Assisted  Feeding  Independent  Med Admin  Assisted  Med Delivery   whole    Wound Care Documentation and Therapy:  Incision 05/11/17 Neck Anterior (Active)   Number of days: 1203        Elimination:  Continence:   · Bowel: Yes  · Bladder: Yes  Urinary Catheter: None   Colostomy/Ileostomy/Ileal Conduit: No       Date of Last BM: 08/20/2020    Intake/Output Summary (Last 24 hours) at 8/27/2020 0842  Last data filed at 8/27/2020 0500  Gross per 24 hour   Intake 480 ml   Output 400 ml   Net 80 ml     I/O last 3 completed shifts: In: 480 [P.O.:480]  Out: 400 [Urine:400]    Safety Concerns:      At Risk for Falls    Impairments/Disabilities:      [address] Impairments/Disabilities:[id number]}    Nutrition Therapy:  Current Nutrition Therapy:   - Oral Diet:  General    Routes of Feeding: Oral  Liquids: No Restrictions  Daily Fluid Restriction: no  Last Modified Barium Swallow with Video (Video Swallowing Test): not done    Treatments at the Time of Hospital Discharge:   Respiratory Treatments: NA  Oxygen Therapy:  is not on home oxygen therapy.   Ventilator:    - No ventilator support    Rehab Therapies: Nursing, Physical Therapy and Occupational Therapy  Weight Bearing Status/Restrictions: No weight bearing restirctions  Other Medical Equipment (for information only, NOT a DME order):  {EQUIPMENT:[id number]}  Other Treatments:HOME HEALTH CARE: LEVEL 3 SAFETY        -Initial home health evaluation to occur within 24-48 hours, in patient home    -Home health agency to establish plan of care for patient over 60 day period    -Medication Reconciliation    -PT/OT/Speech evaluations in home within 24-48 hours of discharge; including  -DME and home safety    -Frontload therapy 5 days, then 3x a week    -OT to evaluate if patient has [address] needs for personal care    - evaluation within 24-48 hours, includes evaluation of resources   and insurance to determine [place], [place], LTC, and [employer]      Patient's personal belongings (please select all that are sent with patient):  Glasses    RN SIGNATURE:  Electronically signed by Niecy Marvin RN on 8/27/20 at 10:32 AM EDT    CASE MANAGEMENT/SOCIAL WORK SECTION    Inpatient Status Date: ***    Readmission Risk Assessment Score:  Readmission Risk              Risk of Unplanned Readmission:        11           Discharging to Facility/ Agency   · Name:   · Address:  · Phone:  · Fax:    Dialysis Facility (if applicable)   · Name:  · Address:  · Dialysis Schedule:  · Phone:  · Fax:    / signature: {Esignature:[id number]}    PHYSICIAN SECTION    Prognosis: Fair    Condition at Discharge: Stable    Rehab Potential (if transferring to Rehab): Good    Recommended Labs or Other Treatments After Discharge: ***    Physician Certification: I certify the above information and transfer of Jo-Ann Oar  is necessary for the continuing treatment of the diagnosis listed and that she requires Home Care for greater 30 days.      Update Admission H&P: No change in H&P    PHYSICIAN SIGNATURE:  Electronically signed by Yg Hyatt MD on 8/27/20 at 8:42 AM EDT

## 2020-08-27 NOTE — PROGRESS NOTES
Left a voice message to daughter, Naty Aguilar informing patient having discharge orders and asking for a  time.

## 2020-08-27 NOTE — PROGRESS NOTES
Physical Therapy   Ralph Shook     Pt sitting at EOB with daughter present. Dressed and discharging in the next half hour. Held PT and pt had no additional question at this time.     [place]co Wheeler[place], [place] [id number]

## 2020-08-27 NOTE — PLAN OF CARE
Problem: Falls - Risk of:  Goal: Will remain free from falls  Description: Will remain free from falls  8/27/2020 0053 by Jonas Cabrera RN  Outcome: Ongoing     Problem: Falls - Risk of:  Goal: Absence of physical injury  Description: Absence of physical injury  8/27/2020 0053 by Jonas Cabrera RN  Outcome: Ongoing   Fall precautions in place. Problem: Skin Integrity:  Goal: Will show no infection signs and symptoms  Description: Will show no infection signs and symptoms  8/27/2020 0053 by Jonas Cabrera RN  Outcome: Ongoing     Problem: Skin Integrity:  Goal: Absence of new skin breakdown  Description: Absence of new skin breakdown  Outcome: Ongoing   No new areas of skin impairment.

## 2020-09-08 ENCOUNTER — TELEPHONE (OUTPATIENT)
Dept: ENT CLINIC | Age: 85
End: 2020-09-08

## 2020-09-08 NOTE — TELEPHONE ENCOUNTER
Patient's daughter calling she would like to make an appointment for her Mom    With  Karlie Bui     Please call [phone]

## 2020-09-09 ENCOUNTER — TELEPHONE (OUTPATIENT)
Dept: NEUROLOGY | Age: 85
End: 2020-09-09

## 2020-09-09 NOTE — TELEPHONE ENCOUNTER
Pt's daughter called to schedule a new patient appointment with Dr. Jonny Pollock. Her mother had what they believed was a stroke, but it was actually a type of headache that she had. She said there was a referral faxed, it is not in her chart, or in her referral tab     I advised her someone would reach out to speak with her.   She was not happy, She stated she has called a few times before and never received a call back     I only saw one previous call

## 2020-09-09 NOTE — TELEPHONE ENCOUNTER
Spoke to daughter Negro Galvez, and got Amy Garay scheduled for November will be calling her to get in sooner if something opens. She was put on lexpro and its not urgent but does want to be seen.

## 2020-11-05 ENCOUNTER — OFFICE VISIT (OUTPATIENT)
Dept: NEUROLOGY | Age: 85
End: 2020-11-05
Payer: MEDICARE

## 2020-11-05 VITALS
HEART RATE: 55 BPM | DIASTOLIC BLOOD PRESSURE: 75 MMHG | WEIGHT: 125 LBS | SYSTOLIC BLOOD PRESSURE: 152 MMHG | HEIGHT: 64 IN | BODY MASS INDEX: 21.34 KG/M2

## 2020-11-05 PROCEDURE — G8484 FLU IMMUNIZE NO ADMIN: HCPCS | Performed by: PSYCHIATRY & NEUROLOGY

## 2020-11-05 PROCEDURE — 1090F PRES/ABSN URINE INCON ASSESS: CPT | Performed by: PSYCHIATRY & NEUROLOGY

## 2020-11-05 PROCEDURE — G8427 DOCREV CUR MEDS BY ELIG CLIN: HCPCS | Performed by: PSYCHIATRY & NEUROLOGY

## 2020-11-05 PROCEDURE — G8399 PT W/DXA RESULTS DOCUMENT: HCPCS | Performed by: PSYCHIATRY & NEUROLOGY

## 2020-11-05 PROCEDURE — G8420 CALC BMI NORM PARAMETERS: HCPCS | Performed by: PSYCHIATRY & NEUROLOGY

## 2020-11-05 PROCEDURE — 1036F TOBACCO NON-USER: CPT | Performed by: PSYCHIATRY & NEUROLOGY

## 2020-11-05 PROCEDURE — 4040F PNEUMOC VAC/ADMIN/RCVD: CPT | Performed by: PSYCHIATRY & NEUROLOGY

## 2020-11-05 PROCEDURE — 99205 OFFICE O/P NEW HI 60 MIN: CPT | Performed by: PSYCHIATRY & NEUROLOGY

## 2020-11-05 PROCEDURE — 1123F ACP DISCUSS/DSCN MKR DOCD: CPT | Performed by: PSYCHIATRY & NEUROLOGY

## 2020-11-05 RX ORDER — ESCITALOPRAM OXALATE 5 MG/1
5 TABLET ORAL DAILY
COMMUNITY
Start: 2020-09-30 | End: 2022-02-03 | Stop reason: SDUPTHER

## 2020-11-05 RX ORDER — ESCITALOPRAM OXALATE 5 MG/1
TABLET ORAL
Status: ON HOLD | COMMUNITY
Start: 2020-09-30 | End: 2021-07-17

## 2020-11-05 RX ORDER — FLUTICASONE PROPIONATE 50 MCG
SPRAY, SUSPENSION (ML) NASAL
COMMUNITY

## 2020-11-05 NOTE — PROGRESS NOTES
NEUROLOGY CONSULTATION     Chief Complaint   Patient presents with    New Patient     Dr Paddy Apley for TIA in June, complex migraine in August, put on lexapro       HISTORY OF PRESENT ILLNESS :    Darci Heredia is a 80 y.o. female who is referred by Dr. Paddy Apley   History was obtained from patient  Additional history was obtained from the patient's daughter. Patient had an episode in June 2020 when she developed scintillating scotoma. This was followed by a headache and then she developed right-sided though with numbness. She was admitted to the hospital evaluated and discharged. In August 2020 she had another spell in which she became confused. She was not answering questions right and her speech was a little slurred. Again she had a headache on the left side. It lasted for several hours and she was worked up in the hospital and discharged. Since then she has been doing reasonably well. Patient has had a history of migraines when she was young. Over the last few years she has not had much headaches at all. No other focal neurological symptoms. Onset of the symptoms are usually acute abrupt and moderately severe. During some of these episodes her blood pressure remains high. No other clear aggravating or relieving factors.     REVIEW OF SYSTEMS    Constitutional:  []   Chills   []  Fatigue   []  Fevers   []  Malaise   []  Weight loss     [x] Denies all of the above    Eyes:  []  Double vision   []  Blurry vision     [x] Denies all of the above    Ears, nose, mouth, throat, and face:   [] Hearing loss    [x]   Hoarseness      [x]  Snoring    [x]  Tinnitus       [] Denies all of the above     Respiratory:   []  Cough    []  Shortness of breath         [x] Denies all of the above     Cardiovascular:   []  Chest pain    []  Exertional chest pressure/discomfort           [] Palpitations    []  Syncope     [x] Denies all of the above    Gastrointestinal:   [x]  Abdominal pain   []  Constipation    []  Diarrhea    [x]   Dysphagia                      [] Denies all of the above    Genitourinary:      [x]  Frequency   []  Hematuria     []  Urinary incontinence           [] Denies all of the above     Hematologic/lymphatic:  []  Bleeding    []  Easy bruising   []  Anemia  [x] Denies all of the above     Musculoskeletal:   [] Back pain       []  Myalgias    []  Neck pain           [x] Denies all of the above    Neurological: As noted in HPI    Behavioral/Psych:   [x] Anxiety    []  Depression     []  Mood swings     [] Denies all of the above     Endocrine:   []  Temperature intolerance     [] Fatigue      [x] Denies all of the above     Allergic/Immunologic:   [] Hay fever    [x] Denies all of the above     Past Medical History:   Diagnosis Date    Allergies     Elevated cholesterol     Hypertension     Reflux     TIA (transient ischemic attack)      Family History   Problem Relation Age of Onset    Cancer Mother         breast    Migraines Mother     Migraines Sister     Migraines Brother      Social History     Socioeconomic History    Marital status:      Spouse name: None    Number of children: None    Years of education: None    Highest education level: None   Occupational History    None   Social Needs    Financial resource strain: None    Food insecurity     Worry: None     Inability: None    Transportation needs     Medical: None     Non-medical: None   Tobacco Use    Smoking status: Never Smoker    Smokeless tobacco: Never Used   Substance and Sexual Activity    Alcohol use: No    Drug use: No    Sexual activity: None   Lifestyle    Physical activity     Days per week: None     Minutes per session: None    Stress: None   Relationships    Social connections     Talks on phone: None     Gets together: None     Attends Bahai service: None     Active member of club or organization: None     Attends meetings of clubs or organizations: None     Relationship status: None    Intimate partner violence     Fear of current or ex partner: None     Emotionally abused: None     Physically abused: None     Forced sexual activity: None   Other Topics Concern    None   Social History Narrative    None       PHYSICAL EXAMINATION:  BP (!) 152/75   Pulse 55   Ht 5' 4\" (1.626 m)   Wt 125 lb (56.7 kg)   BMI 21.46 kg/m²   Appearance: Well appearing, well nourished and in no distress  Mental Status Exam: Patient is alert, oriented to person, place and time. Recent and remote memory is normal  Fund of Knowledge is normal  Attention/concentration is normal.   Speech : No dysarthria  Language : No aphasia  Funduscopic Exam: sharp disc margins  Cranial Nerves:   II: Visual fields:  Full to confrontation  III: Pupils:  equal, round, reactive to light  III,IV,VI: Extra Ocular Movements are intact. No nystagmus  V: Facial sensation is intact to pin prick and light touch  VII: Facial strength and movements: intact and symmetric smile,cheek puffing and eyebrow elevation  VIII: Hearing:  Intact to finger rub bilaterally  IX: Palate  elevation is symmetric  XI: Shoulder shrug is intact  XII: Tongue movements are normal  Motor:  Muscle tone and bulk are normal.   Strength is symmetrical 5/5 in all four extremities. Sensory: Intact to light touch and  pin prick in all four extremities  Coordination:  Normal  Finger to Nose and Heel to Shin bilaterally    . Reflexes:  DTR +2 and symmetric bilaterally  Plantar response: Flexor bilaterally  Gait: Gait and station is normal. Patient can toe/ heel and tandem walk without difficulty  Romberg: negative  Vascular: No carotid bruit bilaterally        DATA:  LABS:  General Labs:    CBC:   Lab Results   Component Value Date    WBC 5.3 08/27/2020    RBC 4.20 08/27/2020    HGB 12.8 08/27/2020    HCT 38.3 08/27/2020    MCV 91.1 08/27/2020    MCH 30.4 08/27/2020    MCHC 33.3 08/27/2020    RDW 13.6

## 2020-11-20 ENCOUNTER — TELEPHONE (OUTPATIENT)
Dept: ENT CLINIC | Age: 85
End: 2020-11-20

## 2020-11-20 NOTE — TELEPHONE ENCOUNTER
Patient's daughter is calling regarding her mom's migraine's     She had one last night , it was very painful , she  did not have the confusion, and stroke like symptoms   She  Is     Taking her tylenol and ib profen is helping  , m asking for rx for migraine's  Please advise    Elodia Kat

## 2020-11-24 ENCOUNTER — OFFICE VISIT (OUTPATIENT)
Dept: NEUROLOGY | Age: 85
End: 2020-11-24
Payer: MEDICARE

## 2020-11-24 PROCEDURE — 99442 PR PHYS/QHP TELEPHONE EVALUATION 11-20 MIN: CPT | Performed by: PSYCHIATRY & NEUROLOGY

## 2020-11-24 RX ORDER — BUTALBITAL, ACETAMINOPHEN AND CAFFEINE 50; 325; 40 MG/1; MG/1; MG/1
TABLET ORAL
Qty: 10 TABLET | Refills: 0 | Status: ON HOLD | OUTPATIENT
Start: 2020-11-24 | End: 2021-07-17

## 2020-11-24 NOTE — PROGRESS NOTES
K 4.0 08/27/2020     08/27/2020    CO2 23 08/27/2020    BUN 17 08/27/2020    LABALBU 3.5 08/27/2020    CREATININE 0.8 08/27/2020    CALCIUM 9.1 08/27/2020    GFRAA >60 08/27/2020    LABGLOM >60 08/27/2020    GLUCOSE 190 08/27/2020     RADIOLOGY REVIEW:  I have reviewed radiology report(s) of: CT  angiogram as well as MRI brain    Impression :  Migraine headache  TIA probably due to migraine-related vasospasm now stable  MRI brain did not show any abnormalities  CT angiogram did not show any significant stenosis  Hypertension    Plan :  Discussed with patient  We decided to give a trial of Fioricet that she will take sparingly for headaches when they happen. She will keep a diary of the headaches. Continue low-dose aspirin 81 mg daily  Avoid chocolate and nitrite containing foods  I will see her back in 3 months and we will review the diary and if she has frequent headaches that she may need prophylactic medication. Time spent with patient during this telephone visit was: 11 to 20 minutes      Please note a portion of  this chart was generated using dragon dictation software. Although every effort was made to ensure the accuracy of this automated transcription, some errors in transcription may have occurred. Pursuant to the emergency declaration under the [facility]5 waiver authority and the [employer] Act, this Virtual  Visit was conducted, with patient's consent, to reduce the patient's risk of exposure to COVID-19 and provide continuity of care for an established patient.

## 2021-07-17 ENCOUNTER — APPOINTMENT (OUTPATIENT)
Dept: GENERAL RADIOLOGY | Age: 86
DRG: 853 | End: 2021-07-17
Payer: MEDICARE

## 2021-07-17 ENCOUNTER — APPOINTMENT (OUTPATIENT)
Dept: CT IMAGING | Age: 86
DRG: 853 | End: 2021-07-17
Payer: MEDICARE

## 2021-07-17 ENCOUNTER — HOSPITAL ENCOUNTER (INPATIENT)
Age: 86
LOS: 4 days | Discharge: HOME | DRG: 853 | End: 2021-07-21
Attending: EMERGENCY MEDICINE | Admitting: INTERNAL MEDICINE
Payer: MEDICARE

## 2021-07-17 DIAGNOSIS — K85.90 ACUTE PANCREATITIS, UNSPECIFIED COMPLICATION STATUS, UNSPECIFIED PANCREATITIS TYPE: Primary | ICD-10-CM

## 2021-07-17 DIAGNOSIS — I48.91 ATRIAL FIBRILLATION WITH RVR (HCC): ICD-10-CM

## 2021-07-17 LAB
A/G RATIO: 1.3 (ref 1.1–2.2)
ALBUMIN SERPL-MCNC: 3.8 G/DL (ref 3.4–5)
ALP BLD-CCNC: 82 U/L (ref 40–129)
ALT SERPL-CCNC: 8 U/L (ref 10–40)
ANION GAP SERPL CALCULATED.3IONS-SCNC: 11 MMOL/L (ref 3–16)
APTT: 30.3 SEC (ref 26.2–38.6)
AST SERPL-CCNC: 17 U/L (ref 15–37)
BACTERIA: ABNORMAL /HPF
BASOPHILS ABSOLUTE: 0.1 K/UL (ref 0–0.2)
BASOPHILS RELATIVE PERCENT: 0.9 %
BILIRUB SERPL-MCNC: 0.7 MG/DL (ref 0–1)
BILIRUBIN URINE: NEGATIVE
BLOOD, URINE: ABNORMAL
BUN BLDV-MCNC: 17 MG/DL (ref 7–20)
CALCIUM SERPL-MCNC: 9.2 MG/DL (ref 8.3–10.6)
CHLORIDE BLD-SCNC: 98 MMOL/L (ref 99–110)
CHOLESTEROL, TOTAL: 186 MG/DL (ref 0–199)
CLARITY: ABNORMAL
CO2: 23 MMOL/L (ref 21–32)
COLOR: YELLOW
CREAT SERPL-MCNC: 0.7 MG/DL (ref 0.6–1.2)
EOSINOPHILS ABSOLUTE: 0 K/UL (ref 0–0.6)
EOSINOPHILS RELATIVE PERCENT: 0.1 %
EPITHELIAL CELLS, UA: 0 /HPF (ref 0–5)
GFR AFRICAN AMERICAN: >60
GFR NON-AFRICAN AMERICAN: >60
GLOBULIN: 3 G/DL
GLUCOSE BLD-MCNC: 119 MG/DL (ref 70–99)
GLUCOSE URINE: NEGATIVE MG/DL
HCT VFR BLD CALC: 43.6 % (ref 36–48)
HDLC SERPL-MCNC: 61 MG/DL (ref 40–60)
HEMOGLOBIN: 14.6 G/DL (ref 12–16)
HYALINE CASTS: 1 /LPF (ref 0–8)
INR BLD: 0.94 (ref 0.88–1.12)
KETONES, URINE: NEGATIVE MG/DL
LDL CHOLESTEROL CALCULATED: 107 MG/DL
LEUKOCYTE ESTERASE, URINE: ABNORMAL
LIPASE: >3000 U/L (ref 13–60)
LYMPHOCYTES ABSOLUTE: 1 K/UL (ref 1–5.1)
LYMPHOCYTES RELATIVE PERCENT: 8.1 %
MCH RBC QN AUTO: 30.3 PG (ref 26–34)
MCHC RBC AUTO-ENTMCNC: 33.5 G/DL (ref 31–36)
MCV RBC AUTO: 90.5 FL (ref 80–100)
MICROSCOPIC EXAMINATION: YES
MONOCYTES ABSOLUTE: 0.8 K/UL (ref 0–1.3)
MONOCYTES RELATIVE PERCENT: 6.5 %
NEUTROPHILS ABSOLUTE: 10.5 K/UL (ref 1.7–7.7)
NEUTROPHILS RELATIVE PERCENT: 84.4 %
NITRITE, URINE: POSITIVE
PARATHYROID HORMONE INTACT: 37.3 PG/ML (ref 14–72)
PDW BLD-RTO: 13.4 % (ref 12.4–15.4)
PH UA: 6.5 (ref 5–8)
PLATELET # BLD: 257 K/UL (ref 135–450)
PMV BLD AUTO: 9 FL (ref 5–10.5)
POTASSIUM SERPL-SCNC: 4 MMOL/L (ref 3.5–5.1)
PRO-BNP: 2724 PG/ML (ref 0–449)
PROTEIN UA: NEGATIVE MG/DL
PROTHROMBIN TIME: 10.6 SEC (ref 9.9–12.7)
RBC # BLD: 4.82 M/UL (ref 4–5.2)
RBC UA: 1 /HPF (ref 0–4)
SODIUM BLD-SCNC: 132 MMOL/L (ref 136–145)
SPECIFIC GRAVITY UA: >1.03 (ref 1–1.03)
TOTAL PROTEIN: 6.8 G/DL (ref 6.4–8.2)
TRIGL SERPL-MCNC: 88 MG/DL (ref 0–150)
TROPONIN: <0.01 NG/ML
TROPONIN: <0.01 NG/ML
TSH REFLEX: 1.22 UIU/ML (ref 0.27–4.2)
URINE REFLEX TO CULTURE: YES
URINE TYPE: ABNORMAL
UROBILINOGEN, URINE: 0.2 E.U./DL
VLDLC SERPL CALC-MCNC: 18 MG/DL
WBC # BLD: 12.5 K/UL (ref 4–11)
WBC UA: 106 /HPF (ref 0–5)

## 2021-07-17 PROCEDURE — 2500000003 HC RX 250 WO HCPCS: Performed by: INTERNAL MEDICINE

## 2021-07-17 PROCEDURE — 84439 ASSAY OF FREE THYROXINE: CPT

## 2021-07-17 PROCEDURE — 84443 ASSAY THYROID STIM HORMONE: CPT

## 2021-07-17 PROCEDURE — 84484 ASSAY OF TROPONIN QUANT: CPT

## 2021-07-17 PROCEDURE — 82542 COL CHROMOTOGRAPHY QUAL/QUAN: CPT

## 2021-07-17 PROCEDURE — 6360000002 HC RX W HCPCS: Performed by: PHYSICIAN ASSISTANT

## 2021-07-17 PROCEDURE — 83880 ASSAY OF NATRIURETIC PEPTIDE: CPT

## 2021-07-17 PROCEDURE — 71045 X-RAY EXAM CHEST 1 VIEW: CPT

## 2021-07-17 PROCEDURE — 96361 HYDRATE IV INFUSION ADD-ON: CPT

## 2021-07-17 PROCEDURE — 2580000003 HC RX 258: Performed by: PHYSICIAN ASSISTANT

## 2021-07-17 PROCEDURE — 87086 URINE CULTURE/COLONY COUNT: CPT

## 2021-07-17 PROCEDURE — 36415 COLL VENOUS BLD VENIPUNCTURE: CPT

## 2021-07-17 PROCEDURE — 85730 THROMBOPLASTIN TIME PARTIAL: CPT

## 2021-07-17 PROCEDURE — 74177 CT ABD & PELVIS W/CONTRAST: CPT

## 2021-07-17 PROCEDURE — 81001 URINALYSIS AUTO W/SCOPE: CPT

## 2021-07-17 PROCEDURE — 85025 COMPLETE CBC W/AUTO DIFF WBC: CPT

## 2021-07-17 PROCEDURE — 2060000000 HC ICU INTERMEDIATE R&B

## 2021-07-17 PROCEDURE — 99285 EMERGENCY DEPT VISIT HI MDM: CPT

## 2021-07-17 PROCEDURE — 83970 ASSAY OF PARATHORMONE: CPT

## 2021-07-17 PROCEDURE — 6370000000 HC RX 637 (ALT 250 FOR IP): Performed by: INTERNAL MEDICINE

## 2021-07-17 PROCEDURE — 80053 COMPREHEN METABOLIC PANEL: CPT

## 2021-07-17 PROCEDURE — 2580000003 HC RX 258: Performed by: INTERNAL MEDICINE

## 2021-07-17 PROCEDURE — 83690 ASSAY OF LIPASE: CPT

## 2021-07-17 PROCEDURE — 96365 THER/PROPH/DIAG IV INF INIT: CPT

## 2021-07-17 PROCEDURE — 85610 PROTHROMBIN TIME: CPT

## 2021-07-17 PROCEDURE — 87077 CULTURE AEROBIC IDENTIFY: CPT

## 2021-07-17 PROCEDURE — 93005 ELECTROCARDIOGRAM TRACING: CPT | Performed by: EMERGENCY MEDICINE

## 2021-07-17 PROCEDURE — 2500000003 HC RX 250 WO HCPCS: Performed by: PHYSICIAN ASSISTANT

## 2021-07-17 PROCEDURE — 6360000004 HC RX CONTRAST MEDICATION: Performed by: PHYSICIAN ASSISTANT

## 2021-07-17 PROCEDURE — 6360000002 HC RX W HCPCS: Performed by: INTERNAL MEDICINE

## 2021-07-17 PROCEDURE — 80061 LIPID PANEL: CPT

## 2021-07-17 PROCEDURE — 96375 TX/PRO/DX INJ NEW DRUG ADDON: CPT

## 2021-07-17 PROCEDURE — 96376 TX/PRO/DX INJ SAME DRUG ADON: CPT

## 2021-07-17 PROCEDURE — 87186 SC STD MICRODIL/AGAR DIL: CPT

## 2021-07-17 RX ORDER — POTASSIUM CHLORIDE 20 MEQ/1
40 TABLET, EXTENDED RELEASE ORAL PRN
Status: DISCONTINUED | OUTPATIENT
Start: 2021-07-17 | End: 2021-07-21 | Stop reason: HOSPADM

## 2021-07-17 RX ORDER — SOLIFENACIN SUCCINATE 10 MG/1
10 TABLET, FILM COATED ORAL DAILY
Status: DISCONTINUED | OUTPATIENT
Start: 2021-07-17 | End: 2021-07-17

## 2021-07-17 RX ORDER — AMLODIPINE BESYLATE 5 MG/1
10 TABLET ORAL NIGHTLY
Status: DISCONTINUED | OUTPATIENT
Start: 2021-07-17 | End: 2021-07-17 | Stop reason: SDUPTHER

## 2021-07-17 RX ORDER — VITAMIN B COMPLEX
1000 TABLET ORAL DAILY
Status: DISCONTINUED | OUTPATIENT
Start: 2021-07-17 | End: 2021-07-21 | Stop reason: HOSPADM

## 2021-07-17 RX ORDER — BUTALBITAL, ACETAMINOPHEN AND CAFFEINE 50; 325; 40 MG/1; MG/1; MG/1
1 TABLET ORAL EVERY 12 HOURS PRN
Status: DISCONTINUED | OUTPATIENT
Start: 2021-07-17 | End: 2021-07-21 | Stop reason: HOSPADM

## 2021-07-17 RX ORDER — ACETAMINOPHEN 325 MG/1
650 TABLET ORAL EVERY 6 HOURS PRN
Status: DISCONTINUED | OUTPATIENT
Start: 2021-07-17 | End: 2021-07-21 | Stop reason: HOSPADM

## 2021-07-17 RX ORDER — CETIRIZINE HYDROCHLORIDE 10 MG/1
10 TABLET ORAL DAILY
Status: DISCONTINUED | OUTPATIENT
Start: 2021-07-17 | End: 2021-07-21 | Stop reason: HOSPADM

## 2021-07-17 RX ORDER — ONDANSETRON 2 MG/ML
4 INJECTION INTRAMUSCULAR; INTRAVENOUS EVERY 6 HOURS PRN
Status: DISCONTINUED | OUTPATIENT
Start: 2021-07-17 | End: 2021-07-21 | Stop reason: HOSPADM

## 2021-07-17 RX ORDER — HYDRALAZINE HYDROCHLORIDE 20 MG/ML
10 INJECTION INTRAMUSCULAR; INTRAVENOUS EVERY 6 HOURS PRN
Status: DISCONTINUED | OUTPATIENT
Start: 2021-07-17 | End: 2021-07-21 | Stop reason: HOSPADM

## 2021-07-17 RX ORDER — DILTIAZEM HYDROCHLORIDE 5 MG/ML
10 INJECTION INTRAVENOUS ONCE
Status: DISCONTINUED | OUTPATIENT
Start: 2021-07-17 | End: 2021-07-21

## 2021-07-17 RX ORDER — ONDANSETRON 4 MG/1
4 TABLET, ORALLY DISINTEGRATING ORAL EVERY 8 HOURS PRN
Status: DISCONTINUED | OUTPATIENT
Start: 2021-07-17 | End: 2021-07-21 | Stop reason: HOSPADM

## 2021-07-17 RX ORDER — BISOPROLOL FUMARATE 5 MG/1
10 TABLET ORAL DAILY
Status: DISCONTINUED | OUTPATIENT
Start: 2021-07-17 | End: 2021-07-17

## 2021-07-17 RX ORDER — ASPIRIN 81 MG/1
81 TABLET ORAL DAILY
Status: DISCONTINUED | OUTPATIENT
Start: 2021-07-17 | End: 2021-07-21 | Stop reason: HOSPADM

## 2021-07-17 RX ORDER — POTASSIUM CHLORIDE 7.45 MG/ML
10 INJECTION INTRAVENOUS PRN
Status: DISCONTINUED | OUTPATIENT
Start: 2021-07-17 | End: 2021-07-21 | Stop reason: HOSPADM

## 2021-07-17 RX ORDER — 0.9 % SODIUM CHLORIDE 0.9 %
500 INTRAVENOUS SOLUTION INTRAVENOUS PRN
Status: DISCONTINUED | OUTPATIENT
Start: 2021-07-17 | End: 2021-07-21 | Stop reason: HOSPADM

## 2021-07-17 RX ORDER — METOPROLOL SUCCINATE 50 MG/1
100 TABLET, EXTENDED RELEASE ORAL DAILY
Status: DISCONTINUED | OUTPATIENT
Start: 2021-07-17 | End: 2021-07-21 | Stop reason: HOSPADM

## 2021-07-17 RX ORDER — SODIUM CHLORIDE 0.9 % (FLUSH) 0.9 %
5-40 SYRINGE (ML) INJECTION EVERY 12 HOURS SCHEDULED
Status: DISCONTINUED | OUTPATIENT
Start: 2021-07-17 | End: 2021-07-21 | Stop reason: HOSPADM

## 2021-07-17 RX ORDER — AMLODIPINE BESYLATE 5 MG/1
10 TABLET ORAL NIGHTLY
Status: DISCONTINUED | OUTPATIENT
Start: 2021-07-17 | End: 2021-07-18

## 2021-07-17 RX ORDER — FENTANYL CITRATE 50 UG/ML
25 INJECTION, SOLUTION INTRAMUSCULAR; INTRAVENOUS ONCE
Status: COMPLETED | OUTPATIENT
Start: 2021-07-17 | End: 2021-07-17

## 2021-07-17 RX ORDER — TROSPIUM CHLORIDE 20 MG/1
20 TABLET, FILM COATED ORAL DAILY
Status: DISCONTINUED | OUTPATIENT
Start: 2021-07-18 | End: 2021-07-21 | Stop reason: HOSPADM

## 2021-07-17 RX ORDER — 0.9 % SODIUM CHLORIDE 0.9 %
1000 INTRAVENOUS SOLUTION INTRAVENOUS ONCE
Status: COMPLETED | OUTPATIENT
Start: 2021-07-17 | End: 2021-07-17

## 2021-07-17 RX ORDER — MORPHINE SULFATE 2 MG/ML
1 INJECTION, SOLUTION INTRAMUSCULAR; INTRAVENOUS EVERY 4 HOURS PRN
Status: DISCONTINUED | OUTPATIENT
Start: 2021-07-17 | End: 2021-07-21 | Stop reason: HOSPADM

## 2021-07-17 RX ORDER — ESCITALOPRAM OXALATE 10 MG/1
5 TABLET ORAL DAILY
Status: DISCONTINUED | OUTPATIENT
Start: 2021-07-17 | End: 2021-07-21 | Stop reason: HOSPADM

## 2021-07-17 RX ORDER — ACETAMINOPHEN 650 MG/1
650 SUPPOSITORY RECTAL EVERY 6 HOURS PRN
Status: DISCONTINUED | OUTPATIENT
Start: 2021-07-17 | End: 2021-07-21 | Stop reason: HOSPADM

## 2021-07-17 RX ORDER — TROSPIUM CHLORIDE 20 MG/1
20 TABLET, FILM COATED ORAL
Status: DISCONTINUED | OUTPATIENT
Start: 2021-07-17 | End: 2021-07-17

## 2021-07-17 RX ORDER — SODIUM CHLORIDE 9 MG/ML
25 INJECTION, SOLUTION INTRAVENOUS PRN
Status: DISCONTINUED | OUTPATIENT
Start: 2021-07-17 | End: 2021-07-21 | Stop reason: HOSPADM

## 2021-07-17 RX ORDER — SODIUM CHLORIDE 0.9 % (FLUSH) 0.9 %
10 SYRINGE (ML) INJECTION PRN
Status: DISCONTINUED | OUTPATIENT
Start: 2021-07-17 | End: 2021-07-21 | Stop reason: HOSPADM

## 2021-07-17 RX ORDER — ONDANSETRON 2 MG/ML
4 INJECTION INTRAMUSCULAR; INTRAVENOUS ONCE
Status: COMPLETED | OUTPATIENT
Start: 2021-07-17 | End: 2021-07-17

## 2021-07-17 RX ORDER — LANOLIN ALCOHOL/MO/W.PET/CERES
1000 CREAM (GRAM) TOPICAL DAILY
Status: DISCONTINUED | OUTPATIENT
Start: 2021-07-17 | End: 2021-07-21 | Stop reason: HOSPADM

## 2021-07-17 RX ORDER — FLUTICASONE PROPIONATE 50 MCG
1 SPRAY, SUSPENSION (ML) NASAL DAILY PRN
Status: DISCONTINUED | OUTPATIENT
Start: 2021-07-18 | End: 2021-07-21 | Stop reason: HOSPADM

## 2021-07-17 RX ORDER — DILTIAZEM HYDROCHLORIDE 5 MG/ML
10 INJECTION INTRAVENOUS ONCE
Status: COMPLETED | OUTPATIENT
Start: 2021-07-17 | End: 2021-07-17

## 2021-07-17 RX ORDER — DILTIAZEM HCL/D5W 125 MG/125
5-15 PLASTIC BAG, INJECTION (ML) INTRAVENOUS CONTINUOUS
Status: DISCONTINUED | OUTPATIENT
Start: 2021-07-17 | End: 2021-07-17 | Stop reason: HOSPADM

## 2021-07-17 RX ADMIN — SODIUM CHLORIDE 1000 ML: 9 INJECTION, SOLUTION INTRAVENOUS at 10:58

## 2021-07-17 RX ADMIN — ONDANSETRON 4 MG: 2 INJECTION INTRAMUSCULAR; INTRAVENOUS at 11:02

## 2021-07-17 RX ADMIN — MORPHINE SULFATE 1 MG: 2 INJECTION, SOLUTION INTRAMUSCULAR; INTRAVENOUS at 17:58

## 2021-07-17 RX ADMIN — DILTIAZEM HYDROCHLORIDE 10 MG: 5 INJECTION INTRAVENOUS at 11:02

## 2021-07-17 RX ADMIN — AMLODIPINE BESYLATE 10 MG: 5 TABLET ORAL at 22:07

## 2021-07-17 RX ADMIN — Medication 5 MG/HR: at 11:49

## 2021-07-17 RX ADMIN — CHOLECALCIFEROL TAB 25 MCG (1000 UNIT) 1000 UNITS: 25 TAB at 17:50

## 2021-07-17 RX ADMIN — DILTIAZEM HYDROCHLORIDE 5 MG/HR: 5 INJECTION INTRAVENOUS at 17:53

## 2021-07-17 RX ADMIN — ENOXAPARIN SODIUM 50 MG: 60 INJECTION SUBCUTANEOUS at 22:00

## 2021-07-17 RX ADMIN — FENTANYL CITRATE 25 MCG: 50 INJECTION, SOLUTION INTRAMUSCULAR; INTRAVENOUS at 11:11

## 2021-07-17 RX ADMIN — CYANOCOBALAMIN TAB 1000 MCG 1000 MCG: 1000 TAB at 17:49

## 2021-07-17 RX ADMIN — IOPAMIDOL 75 ML: 755 INJECTION, SOLUTION INTRAVENOUS at 11:27

## 2021-07-17 ASSESSMENT — ENCOUNTER SYMPTOMS
BACK PAIN: 0
CONSTIPATION: 0
SHORTNESS OF BREATH: 0
ABDOMINAL PAIN: 1
BLOOD IN STOOL: 0
WHEEZING: 0
STRIDOR: 0
ABDOMINAL DISTENTION: 0
COLOR CHANGE: 0
RECTAL PAIN: 0
COUGH: 0
NAUSEA: 1
VOMITING: 1
DIARRHEA: 0

## 2021-07-17 ASSESSMENT — PAIN DESCRIPTION - PAIN TYPE
TYPE: ACUTE PAIN
TYPE: ACUTE PAIN

## 2021-07-17 ASSESSMENT — PAIN SCALES - GENERAL
PAINLEVEL_OUTOF10: 8
PAINLEVEL_OUTOF10: 3
PAINLEVEL_OUTOF10: 0
PAINLEVEL_OUTOF10: 10
PAINLEVEL_OUTOF10: 9

## 2021-07-17 ASSESSMENT — PAIN DESCRIPTION - FREQUENCY: FREQUENCY: CONTINUOUS

## 2021-07-17 ASSESSMENT — PAIN DESCRIPTION - LOCATION
LOCATION: ABDOMEN

## 2021-07-17 ASSESSMENT — PAIN DESCRIPTION - ORIENTATION
ORIENTATION: UPPER
ORIENTATION: UPPER

## 2021-07-17 ASSESSMENT — PAIN DESCRIPTION - DESCRIPTORS
DESCRIPTORS: SORE
DESCRIPTORS: SORE

## 2021-07-17 ASSESSMENT — PAIN DESCRIPTION - ONSET: ONSET: ON-GOING

## 2021-07-17 NOTE — ED PROVIDER NOTES
[facility]        Pt Name: Luis Manuel Kumar  MRN: [id number]  [place] 1935  Date of evaluation: 7/17/2021  Provider: Anali Goins PA-C  PCP: Ailin Rocha MD  Note Started: 10:06 AM EDT        I have seen and evaluated this patient with my supervising physician Sonya Mead MD.    [address] [place] [address]       Chief Complaint   Patient presents with    Abdominal Pain     abd 'soreness' started yesterday after lunch.  Emesis       HISTORY OF PRESENT ILLNESS   (Location, Timing/Onset, Context/Setting, Quality, Duration, Modifying Factors, Severity, Associated Signs and Symptoms)  Note limiting factors. Chief Complaint: abdominal pain     Luis Manuel Kumar is a 80 y.o. female who presents to the emergency department complaining of upper abdominal pain starting yesterday after eating lunch. She reports that this feels similar to pancreatitis which she had years ago. She denies alcohol consumption or  diabetes. She still has her gallbladder. The only prior abdominal surgery is a \"kidney stone removal \"procedure. She reports nausea and vomiting. Denies diarrhea, constipation, bloody or black stool. Denies bloody or bilious or coffee-ground emesis. Nursing Notes were all reviewed and agreed with or any disagreements were addressed in the HPI. REVIEW OF SYSTEMS    (2-9 systems for level 4, 10 or more for level 5)     Review of Systems   Constitutional: Negative for chills and fever. HENT: Negative. Eyes: Negative for visual disturbance. Respiratory: Negative for cough, shortness of breath, wheezing and stridor. Cardiovascular: Negative for chest pain, palpitations and leg swelling. Gastrointestinal: Positive for abdominal pain, nausea and vomiting. Negative for abdominal distention, blood in stool, constipation, diarrhea and rectal pain. Genitourinary: Negative.     Musculoskeletal: Negative for back pain, neck pain and neck stiffness. Skin: Negative for color change, pallor, rash and wound. Neurological: Negative for dizziness, tremors, seizures, syncope, facial asymmetry, speech difficulty, weakness, light-headedness, numbness and headaches. Psychiatric/Behavioral: Negative for confusion. All other systems reviewed and are negative. Positives and Pertinent negatives as per HPI. Except as noted above in the ROS, all other systems were reviewed and negative. PAST MEDICAL HISTORY     Past Medical History:   Diagnosis Date    Allergies     Elevated cholesterol     Hypertension     Reflux     TIA (transient ischemic attack)          SURGICAL HISTORY     Past Surgical History:   Procedure Laterality Date    COLONOSCOPY      BX OR SECAL POLYP    CYSTOSCOPY      EXCISION OF PARATHYROID MASS  05/11/2017    EXCISION OF LEFT PARATHYROID ADENOMA WITH FROZEN SECTION    HEMORRHOID SURGERY      JOINT REPLACEMENT Right     KNEE    KIDNEY STONE SURGERY      SKIN CANCER EXCISION           CURRENTMEDICATIONS       Previous Medications    AMLODIPINE (NORVASC) 5 MG TABLET    Take 10 mg by mouth nightly     ASPIRIN 81 MG EC TABLET    Take 1 tablet by mouth daily    BISOPROLOL (ZEBETA) 10 MG TABLET    Take 10 mg by mouth daily    BUTALBITAL-ACETAMINOPHEN-CAFFEINE (FIORICET, ESGIC) -40 MG PER TABLET    Take 1 tablet twice daily for severe headaches. CETIRIZINE (ZYRTEC) 10 MG TABLET    Take 10 mg by mouth daily. CHOLECALCIFEROL (VITAMIN D) 1000 UNIT CAPS    Take 1,000 Units by mouth daily. ESCITALOPRAM (LEXAPRO) 5 MG TABLET    Take 5 mg by mouth daily    ESCITALOPRAM (LEXAPRO) 5 MG TABLET        FLUTICASONE (FLONASE) 50 MCG/ACT NASAL SPRAY    fluticasone propionate 50 mcg/actuation nasal spray,suspension    SOLIFENACIN (VESICARE) 10 MG TABLET    Take 10 mg by mouth daily.     VITAMIN B-12 (CYANOCOBALAMIN) 1000 MCG TABLET    Take 1,000 mcg by mouth daily         ALLERGIES     Benzonatate, Negative signs include Rossi's sign, Rovsing's sign, McBurney's sign, psoas sign and obturator sign. Musculoskeletal:         General: Normal range of motion. Cervical back: Normal range of motion. Skin:     General: Skin is warm and dry. Capillary Refill: Capillary refill takes less than 2 seconds. Coloration: Skin is not jaundiced or pale. Findings: No bruising, erythema, lesion or rash. Neurological:      Mental Status: She is alert and oriented to person, place, and time. Mental status is at baseline.    Psychiatric:         Mood and Affect: Mood normal.         Behavior: Behavior normal.         DIAGNOSTIC RESULTS   LABS:    Labs Reviewed   CBC WITH AUTO DIFFERENTIAL - Abnormal; Notable for the following components:       Result Value    WBC 12.5 (*)     Neutrophils Absolute 10.5 (*)     All other components within normal limits    Narrative:     Performed at:  [facility] [address]   Phone [phone]   COMPREHENSIVE METABOLIC PANEL - Abnormal; Notable for the following components:    Sodium 132 (*)     Chloride 98 (*)     Glucose 119 (*)     ALT 8 (*)     All other components within normal limits    Narrative:     Performed at:  [facility] [address] [facility] [facility] [employer]   Phone [phone]   LIPASE - Abnormal; Notable for the following components:    Lipase >3,000.0 (*)     All other components within normal limits    Narrative:     Performed at:  [facility] [address] [facility] [address]   Phone 21  - Abnormal; Notable for the following components:    Pro-BNP 2,724 (*)     All other components within normal limits    Narrative:     Performed at:  [facility] [address] [facility] [facility] [employer]   Phone [phone]   TROPONIN    Narrative:     Performed at:  [facility]  [address], [place]   Phone [phone]   APTT    Narrative:     Performed at:  [facility]  [address],  [place], [address]   Phone [phone]   PROTIME-INR    Narrative:     Performed at:  [facility] [address]  [place], [address]   Phone [phone]   URINE RT REFLEX TO CULTURE   LIPID PANEL   TSH WITH REFLEX   PTH, INTACT   PTH-RELATED PEPTIDE       When ordered only abnormal lab results are displayed. All other labs were within normal range or not returned as of this dictation. EKG: When ordered, EKG's are interpreted by the Emergency Department Physician in the absence of a cardiologist.  Please see their note for interpretation of EKG. RADIOLOGY:   Non-plain film images such as CT, Ultrasound and MRI are read by the radiologist. Plain radiographic images are visualized and preliminarily interpreted by the ED Provider with the below findings:        Interpretation per the Radiologist below, if available at the time of this note:    CT ABDOMEN PELVIS W IV CONTRAST Additional Contrast? None   Preliminary Result   1. Acute pancreatitis. Diffuse peripancreatic inflammatory changes with no   focal peripancreatic fluid collection. 2. Colonic diverticulosis with no acute features. Mild gastric wall   thickening with mucosal enhancement, possibly a mild gastritis. 3. Nonobstructive bilateral nephrolithiasis. Bilateral renal cysts. 4. Mild hepatic steatosis. XR CHEST PORTABLE   Final Result   Stable cardiomegaly. No acute cardiopulmonary disease. Dulce Rosas PROCEDURES   Unless otherwise noted below, none     Procedures    CRITICAL CARE TIME   Critical Care  There was a high probability of life-threatening clinical deterioration in the patient's condition requiring my urgent intervention.   Total critical care time with the patient was 50 minutes excluding separately reportable procedures. Critical care required due to patients acute pancreatitis with new onset atrial fibrillation with rvr prompting medical management, consultation and admission. CONSULTS:  IP CONSULT TO INTERNAL MEDICINE  My attending discussed the case with Dr Sarahi Kaur for admission at [phone]. Please see his note for details regarding this discussion. EMERGENCY DEPARTMENT COURSE and DIFFERENTIAL DIAGNOSIS/MDM:   Vitals:    Vitals:    07/17/21 1045 07/17/21 1100 07/17/21 1111 07/17/21 1139   BP: (!) 125/99 (!) 121/98  125/80   Pulse: 123 111 76 111   Resp: 22 24 19 12   Temp:       TempSrc:       SpO2: 96% 95% 92% 96%   Weight:       Height:           Patient was given the following medications:  Medications   dilTIAZem injection 10 mg (10 mg Intravenous Given 7/17/21 1102)     Followed by   dilTIAZem (CARDIZEM) 125 mg in dextrose 5% 125 mL infusion (premix) (5 mg/hr Intravenous New Bag 7/17/21 1149)   0.9 % sodium chloride bolus (1,000 mLs Intravenous New Bag 7/17/21 1058)   fentaNYL (SUBLIMAZE) injection 25 mcg (25 mcg Intravenous Given 7/17/21 1111)   ondansetron (ZOFRAN) injection 4 mg (4 mg Intravenous Given 7/17/21 1102)   iopamidol (ISOVUE-370) 76 % injection 75 mL (75 mLs Intravenous Given 7/17/21 1127)         This patient presents complaining of epigastric abdominal pain, nausea and vomiting. CT scan shows acute pancreatitis. No overt surgical cause at this time. EKG reveals new onset atrial fibrillation with RVR. Therefore, we did initiate Cardizem bolus and infusion, IV fluids and pain control. Patient understands and agrees with plan for admission. Dr. Lilli Webber has agreed to admit. We have addressed concerns and expectations. FINAL IMPRESSION      1. Acute pancreatitis, unspecified complication status, unspecified pancreatitis type    2.  Atrial fibrillation with RVR [facility])          DISPOSITION/PLAN   DISPOSITION Decision To Admit 07/17/2021 11:11:32

## 2021-07-17 NOTE — ED NOTES
Bedside report given to [facility].  Pt transported via wheelchair by 3T DERIK Staton, DERIK  07/17/21 9990

## 2021-07-17 NOTE — ED NOTES
Bed: 06  Expected date:   Expected time:   Means of arrival: [facility]  Comments:  PORSCHE Abbott RN  07/17/21 9044

## 2021-07-17 NOTE — ED PROVIDER NOTES
I independently performed a history and physical on Carolyn De León. All diagnostic, treatment, and disposition decisions were made by myself in conjunction with the advanced practice provider. Briefly, this is a 80 y.o. female here for CC of epigastric abdominal pain for 1d and a few weeks of palpitations. Hx of hyperparathyroidism and pancreatitis therein. No n/v/d  No fever, chills ,sweats. On exam, WDWNF , NAD, heart rapid, irreg irreg, no m/c/g/r, Lungs CTAB, abdomen TTP to epigastrium. EKG   EKG reviewed by myself. Dated today 200. Rate 115. A. fib with RVR. Rate is increased in A. fib is new from 20 August 2020    Screenings        MDM  New Onset AF with RVR  Here for probable pancreatitis    I reviewed her work-up. Lipase is 3,000. Heart rate controlled now with Cardizem. I will admit her to her PCP; Dr. Panda Serrano is covering. Patient Referrals:  No follow-up provider specified. Discharge Medications:  New Prescriptions    No medications on file       FINAL IMPRESSION  1. Acute pancreatitis, unspecified complication status, unspecified pancreatitis type    2. Atrial fibrillation with RVR (HCC)        Blood pressure 125/80, pulse 111, temperature 98.3 °F (36.8 °C), temperature source Oral, resp. rate 12, height 5' 3\" (1.6 m), weight 120 lb (54.4 kg), SpO2 96 %, not currently breastfeeding. For further details of [facility] emergency department encounter, please see documentation by advanced practice provider, Ronit Murcia.        Adrianna Connor MD  07/17/21 9837

## 2021-07-17 NOTE — ED NOTES
This RN at bedside. Pt presents with 9/10 sharp abdominal pain with NV. Pt presents with afib with RVR. Pt daughter at bedside. Bed low and locked. Telemetry monitor on. Pt states there are no further needs at this time.      Ami King RN  07/17/21 8947

## 2021-07-18 PROBLEM — I25.10 CORONARY ARTERY CALCIFICATION SEEN ON CT SCAN: Status: ACTIVE | Noted: 2021-07-18

## 2021-07-18 LAB
A/G RATIO: 1.3 (ref 1.1–2.2)
ALBUMIN SERPL-MCNC: 3.2 G/DL (ref 3.4–5)
ALP BLD-CCNC: 69 U/L (ref 40–129)
ALT SERPL-CCNC: 6 U/L (ref 10–40)
AMYLASE: 362 U/L (ref 25–115)
ANION GAP SERPL CALCULATED.3IONS-SCNC: 8 MMOL/L (ref 3–16)
AST SERPL-CCNC: 13 U/L (ref 15–37)
BASOPHILS ABSOLUTE: 0 K/UL (ref 0–0.2)
BASOPHILS RELATIVE PERCENT: 0.3 %
BILIRUB SERPL-MCNC: 0.9 MG/DL (ref 0–1)
BUN BLDV-MCNC: 14 MG/DL (ref 7–20)
CALCIUM SERPL-MCNC: 9 MG/DL (ref 8.3–10.6)
CHLORIDE BLD-SCNC: 101 MMOL/L (ref 99–110)
CO2: 26 MMOL/L (ref 21–32)
CREAT SERPL-MCNC: 0.7 MG/DL (ref 0.6–1.2)
EKG ATRIAL RATE: 107 BPM
EKG ATRIAL RATE: 125 BPM
EKG DIAGNOSIS: NORMAL
EKG DIAGNOSIS: NORMAL
EKG Q-T INTERVAL: 280 MS
EKG Q-T INTERVAL: 334 MS
EKG QRS DURATION: 80 MS
EKG QRS DURATION: 82 MS
EKG QTC CALCULATION (BAZETT): 387 MS
EKG QTC CALCULATION (BAZETT): 449 MS
EKG R AXIS: 34 DEGREES
EKG R AXIS: 7 DEGREES
EKG T AXIS: 127 DEGREES
EKG T AXIS: 99 DEGREES
EKG VENTRICULAR RATE: 109 BPM
EKG VENTRICULAR RATE: 115 BPM
EOSINOPHILS ABSOLUTE: 0 K/UL (ref 0–0.6)
EOSINOPHILS RELATIVE PERCENT: 0.2 %
GFR AFRICAN AMERICAN: >60
GFR NON-AFRICAN AMERICAN: >60
GLOBULIN: 2.5 G/DL
GLUCOSE BLD-MCNC: 94 MG/DL (ref 70–99)
HCT VFR BLD CALC: 36.9 % (ref 36–48)
HEMOGLOBIN: 12.3 G/DL (ref 12–16)
INR BLD: 1.15 (ref 0.88–1.12)
LIPASE: 442 U/L (ref 13–60)
LYMPHOCYTES ABSOLUTE: 1.4 K/UL (ref 1–5.1)
LYMPHOCYTES RELATIVE PERCENT: 11.1 %
MCH RBC QN AUTO: 30.2 PG (ref 26–34)
MCHC RBC AUTO-ENTMCNC: 33.3 G/DL (ref 31–36)
MCV RBC AUTO: 90.7 FL (ref 80–100)
MONOCYTES ABSOLUTE: 1.1 K/UL (ref 0–1.3)
MONOCYTES RELATIVE PERCENT: 9.1 %
NEUTROPHILS ABSOLUTE: 9.9 K/UL (ref 1.7–7.7)
NEUTROPHILS RELATIVE PERCENT: 79.3 %
PDW BLD-RTO: 13.3 % (ref 12.4–15.4)
PLATELET # BLD: 213 K/UL (ref 135–450)
PMV BLD AUTO: 8.4 FL (ref 5–10.5)
POTASSIUM REFLEX MAGNESIUM: 3.8 MMOL/L (ref 3.5–5.1)
PROTHROMBIN TIME: 13.1 SEC (ref 9.9–12.7)
RBC # BLD: 4.06 M/UL (ref 4–5.2)
SODIUM BLD-SCNC: 135 MMOL/L (ref 136–145)
T4 FREE: 1.3 NG/DL (ref 0.9–1.8)
TOTAL PROTEIN: 5.7 G/DL (ref 6.4–8.2)
WBC # BLD: 12.5 K/UL (ref 4–11)

## 2021-07-18 PROCEDURE — 99291 CRITICAL CARE FIRST HOUR: CPT | Performed by: INTERNAL MEDICINE

## 2021-07-18 PROCEDURE — 6370000000 HC RX 637 (ALT 250 FOR IP): Performed by: INTERNAL MEDICINE

## 2021-07-18 PROCEDURE — 80053 COMPREHEN METABOLIC PANEL: CPT

## 2021-07-18 PROCEDURE — 93010 ELECTROCARDIOGRAM REPORT: CPT | Performed by: INTERNAL MEDICINE

## 2021-07-18 PROCEDURE — 2580000003 HC RX 258: Performed by: INTERNAL MEDICINE

## 2021-07-18 PROCEDURE — 85025 COMPLETE CBC W/AUTO DIFF WBC: CPT

## 2021-07-18 PROCEDURE — 85610 PROTHROMBIN TIME: CPT

## 2021-07-18 PROCEDURE — 93005 ELECTROCARDIOGRAM TRACING: CPT | Performed by: INTERNAL MEDICINE

## 2021-07-18 PROCEDURE — 6360000002 HC RX W HCPCS: Performed by: INTERNAL MEDICINE

## 2021-07-18 PROCEDURE — 82150 ASSAY OF AMYLASE: CPT

## 2021-07-18 PROCEDURE — 2060000000 HC ICU INTERMEDIATE R&B

## 2021-07-18 PROCEDURE — 83690 ASSAY OF LIPASE: CPT

## 2021-07-18 PROCEDURE — 2500000003 HC RX 250 WO HCPCS: Performed by: INTERNAL MEDICINE

## 2021-07-18 PROCEDURE — 36415 COLL VENOUS BLD VENIPUNCTURE: CPT

## 2021-07-18 RX ORDER — SODIUM CHLORIDE 9 MG/ML
INJECTION, SOLUTION INTRAVENOUS CONTINUOUS
Status: DISCONTINUED | OUTPATIENT
Start: 2021-07-18 | End: 2021-07-19

## 2021-07-18 RX ADMIN — ENOXAPARIN SODIUM 50 MG: 60 INJECTION SUBCUTANEOUS at 09:01

## 2021-07-18 RX ADMIN — CHOLECALCIFEROL TAB 25 MCG (1000 UNIT) 1000 UNITS: 25 TAB at 09:09

## 2021-07-18 RX ADMIN — CYANOCOBALAMIN TAB 1000 MCG 1000 MCG: 1000 TAB at 09:09

## 2021-07-18 RX ADMIN — SODIUM CHLORIDE: 9 INJECTION, SOLUTION INTRAVENOUS at 09:21

## 2021-07-18 RX ADMIN — ESCITALOPRAM OXALATE 5 MG: 10 TABLET ORAL at 09:09

## 2021-07-18 RX ADMIN — DILTIAZEM HYDROCHLORIDE 5 MG/HR: 5 INJECTION INTRAVENOUS at 12:22

## 2021-07-18 RX ADMIN — METOPROLOL SUCCINATE 100 MG: 50 TABLET, EXTENDED RELEASE ORAL at 09:11

## 2021-07-18 RX ADMIN — ASPIRIN 81 MG: 81 TABLET, COATED ORAL at 09:09

## 2021-07-18 RX ADMIN — ENOXAPARIN SODIUM 50 MG: 60 INJECTION SUBCUTANEOUS at 21:29

## 2021-07-18 RX ADMIN — TROSPIUM CHLORIDE 20 MG: 20 TABLET, FILM COATED ORAL at 09:09

## 2021-07-18 RX ADMIN — Medication 10 ML: at 08:04

## 2021-07-18 RX ADMIN — CETIRIZINE HYDROCHLORIDE 10 MG: 10 TABLET, FILM COATED ORAL at 09:09

## 2021-07-18 RX ADMIN — SODIUM CHLORIDE: 9 INJECTION, SOLUTION INTRAVENOUS at 20:01

## 2021-07-18 ASSESSMENT — ENCOUNTER SYMPTOMS
VOMITING: 1
NAUSEA: 1
COUGH: 0
ABDOMINAL PAIN: 1
EYE PAIN: 0
BACK PAIN: 0
EYE REDNESS: 0
RHINORRHEA: 0
SHORTNESS OF BREATH: 0

## 2021-07-18 ASSESSMENT — PAIN SCALES - GENERAL
PAINLEVEL_OUTOF10: 0

## 2021-07-18 NOTE — PROGRESS NOTES
Progress Note - Dr. Sumit Monreal - Internal Medicine  PCP: Derrlel Dominguez MD [address] [facility] / [facility] Liam Solares [phone]    Hospital Day: 1  Code Status: Full Code  Current Diet: Diet NPO Exceptions are: Sips of Water with Meds        CC: follow up on medical issues    Subjective:   Ct Beebe is a 80 y.o. female. She denies problems    Pt remains in fib  Still with abd pain  Amylase/lpase pending this am    She denies chest pain, denies shortness of breath, complains of nausea,  denies emesis. 10 system Review of Systems is reviewed with patient, and pertinent positives are noted in HPI above . Otherwise, Review of systems is negative. I have reviewed the patient's medical and social history in detail and updated the computerized patient record. To recap: She  has a past medical history of Allergies, Elevated cholesterol, Hypertension, Reflux, and TIA (transient ischemic attack). . She  has a past surgical history that includes Kidney stone surgery; Colonoscopy; Skin cancer excision; joint replacement (Right); Cystoscopy; Hemorrhoid surgery; and Excision of Parathyroid Mass (05/11/2017). . She  reports that she has never smoked. She has never used smokeless tobacco. She reports that she does not drink alcohol and does not use drugs. .        Active Hospital Problems    Diagnosis Date Noted    A-fib [facility]) [I48.91] 07/17/2021    Acute pancreatitis [K85.90] 07/17/2021    Gastroesophageal reflux disease without esophagitis [K21.9] 06/22/2020    Hyperparathyroidism ([facility] [address] [E21.3] 03/30/2017    Pure hypercholesterolemia [E78.00] 04/21/2015       Current Facility-Administered Medications: amLODIPine (NORVASC) tablet 10 mg, 10 mg, Oral, Nightly  aspirin EC tablet 81 mg, 81 mg, Oral, Daily  butalbital-acetaminophen-caffeine (FIORICET, ESGIC) per tablet 1 tablet, 1 tablet, Oral, Q12H PRN  cetirizine (ZYRTEC) tablet 10 mg, 10 mg, Oral, Daily  Vitamin D (CHOLECALCIFEROL) tablet 1,000 Units, 1,000 Units, Oral, Daily  escitalopram (LEXAPRO) tablet 5 mg, 5 mg, Oral, Daily  fluticasone (FLONASE) 50 MCG/ACT nasal spray 1 spray, 1 spray, Each Nostril, Daily PRN  vitamin B-12 (CYANOCOBALAMIN) tablet 1,000 mcg, 1,000 mcg, Oral, Daily  perflutren lipid microspheres (DEFINITY) injection 1.65 mg, 1.5 mL, Intravenous, ONCE PRN  sodium chloride flush 0.9 % injection 5-40 mL, 5-40 mL, Intravenous, 2 times per day  sodium chloride flush 0.9 % injection 10 mL, 10 mL, Intravenous, PRN  0.9 % sodium chloride infusion, 25 mL, Intravenous, PRN  ondansetron (ZOFRAN-ODT) disintegrating tablet 4 mg, 4 mg, Oral, Q8H PRN **OR** ondansetron (ZOFRAN) injection 4 mg, 4 mg, Intravenous, Q6H PRN  magnesium hydroxide (MILK OF MAGNESIA) 400 MG/5ML suspension 30 mL, 30 mL, Oral, Daily PRN  acetaminophen (TYLENOL) tablet 650 mg, 650 mg, Oral, Q6H PRN **OR** acetaminophen (TYLENOL) suppository 650 mg, 650 mg, Rectal, Q6H PRN  enoxaparin (LOVENOX) injection 50 mg, 1 mg/kg, Subcutaneous, BID  dilTIAZem injection 10 mg, 10 mg, Intravenous, Once **FOLLOWED BY** dilTIAZem 125 mg in dextrose 5 % 125 mL infusion, 5 mg/hr, Intravenous, Continuous  morphine (PF) injection 1 mg, 1 mg, Intravenous, Q4H PRN  metoprolol succinate (TOPROL XL) extended release tablet 100 mg, 100 mg, Oral, Daily  trospium (SANCTURA) tablet 20 mg, 20 mg, Oral, Daily  hydrALAZINE (APRESOLINE) injection 10 mg, 10 mg, Intravenous, Q6H PRN  0.9 % sodium chloride bolus, 500 mL, Intravenous, PRN  potassium chloride (KLOR-CON M) extended release tablet 40 mEq, 40 mEq, Oral, PRN **OR** potassium bicarb-citric acid (EFFER-K) effervescent tablet 40 mEq, 40 mEq, Oral, PRN **OR** potassium chloride 10 mEq/100 mL IVPB (Peripheral Line), 10 mEq, Intravenous, PRN         Objective:  BP (!) 104/57   Pulse 92   Temp 98.6 °F (37 °C) (Oral)   Resp 18   Ht 5' 3\" (1.6 m)   Wt 120 lb (54.4 kg)   SpO2 92%   BMI 21.26 kg/m²      Patient Vitals for the past 24 hrs:   BP Temp Temp src Pulse Resp SpO2 Height Weight   07/18/21 0401 (!) 104/57 98.6 °F (37 °C) Oral 92 18 92 % -- --   07/17/21 2359 111/72 98.8 °F (37.1 °C) Oral 90 18 92 % -- --   07/17/21 2149 111/65 98.2 °F (36.8 °C) Oral 94 20 92 % -- --   07/17/21 1715 115/70 98.4 °F (36.9 °C) Oral 99 20 93 % -- --   07/17/21 1445 122/75 98.4 °F (36.9 °C) Oral 94 22 94 % -- --   07/17/21 1430 114/76 98 °F (36.7 °C) Infrared 90 29 91 % -- --   07/17/21 1400 112/68 -- -- 93 17 -- -- --   07/17/21 1330 115/80 -- -- 90 12 92 % -- --   07/17/21 1300 122/80 -- -- 109 22 93 % -- --   07/17/21 1230 121/77 -- -- 101 14 94 % -- --   07/17/21 1200 115/83 -- -- 103 12 95 % -- --   07/17/21 1139 125/80 -- -- 111 12 96 % -- --   07/17/21 1111 -- -- -- 76 19 92 % -- --   07/17/21 1100 (!) 121/98 -- -- 111 24 95 % -- --   07/17/21 1045 (!) 125/99 -- -- 123 22 96 % -- --   07/17/21 1030 117/85 -- -- 115 16 94 % -- --   07/17/21 1015 120/79 -- -- 113 20 94 % -- --   07/17/21 1000 (!) 132/94 -- -- 117 16 95 % -- --   07/17/21 0958 (!) 126/102 98.3 °F (36.8 °C) Oral 115 16 93 % 5' 3\" (1.6 m) 120 lb (54.4 kg)     Patient Vitals for the past 96 hrs (Last 3 readings):   Weight   07/17/21 0958 120 lb (54.4 kg)         No intake or output data in the 24 hours ending 07/18/21 0806      Physical Exam:   BP (!) 104/57   Pulse 92   Temp 98.6 °F (37 °C) (Oral)   Resp 18   Ht 5' 3\" (1.6 m)   Wt 120 lb (54.4 kg)   SpO2 92%   BMI 21.26 kg/m²   General appearance: alert, appears stated age and cooperative  Head: Normocephalic, without obvious abnormality, atraumatic  Lungs: clear to auscultation bilaterally  Heart: irreg irreg  Abdomen: soft mild tenderness no rebound  Extremities: extremities normal, atraumatic, no cyanosis or edema    Labs:  Lab Results   Component Value Date    WBC 12.5 (H) 07/18/2021    HGB 12.3 07/18/2021    HCT 36.9 07/18/2021     07/18/2021    CHOL 186 07/17/2021    TRIG 88 07/17/2021    HDL 61 (H) 07/17/2021    ALT 6 (L) 07/18/2021    AST 13 (L) 07/18/2021     (L) 07/18/2021    K 3.8 07/18/2021     07/18/2021    CREATININE 0.7 07/18/2021    BUN 14 07/18/2021    CO2 26 07/18/2021    TSH 1.02 02/15/2017    INR 1.15 (H) 07/18/2021    LABA1C 5.5 08/26/2020    LABMICR YES 07/17/2021     Lab Results   Component Value Date    TROPONINI <0.01 07/17/2021       Recent Imaging Results are Reviewed:  CT ABDOMEN PELVIS W IV CONTRAST Additional Contrast? None    Result Date: 7/17/2021  EXAMINATION: CT OF THE ABDOMEN AND PELVIS WITH CONTRAST 7/17/2021 11:05 am TECHNIQUE: CT of the abdomen and pelvis was performed with the administration of intravenous contrast. Multiplanar reformatted images are provided for review. Dose modulation, iterative reconstruction, and/or weight based adjustment of the mA/kV was utilized to reduce the radiation dose to as low as reasonably achievable. COMPARISON: 08/25/2020 HISTORY: ORDERING SYSTEM PROVIDED HISTORY: abd pain TECHNOLOGIST PROVIDED HISTORY: Reason for exam:->abd pain Additional Contrast?->None Decision Support Exception - unselect if not a suspected or confirmed emergency medical condition->Emergency Medical Condition (MA) Reason for Exam: Abdominal Pain (abd 'soreness' started yesterday after lunch. ) Acuity: Acute Type of Exam: Initial FINDINGS: Lower Chest: No acute infiltrate at the lung bases. Plate-like atelectasis bilaterally. Cardiomegaly with scattered coronary vascular calcification. Organs: Mild hepatic steatosis with no focal abnormality. The spleen and adrenal glands are unremarkable. No acute biliary findings. No solid renal mass or significant hydronephrosis. Bilateral renal cysts, the largest on the left measuring 3.5 cm. Nonobstructive bilateral nephrolithiasis. Diffuse peripancreatic inflammatory changes consistent with acute pancreatitis. No focal peripancreatic fluid collection or evidence of necrosis.   Scattered pancreatic calcifications, the largest in the body measuring 10 mm consistent with chronic pancreatitis. GI/Bowel: No pericolonic inflammatory changes. Scattered diverticulosis with no acute features. Mild retained stool particularly in the ascending colon. No small bowel distension. Mild gastric wall thickening with distension of the proximal stomach. Mild mucosal enhancement. The duodenal sweep is unremarkable. Pelvis: No pelvic mass or free pelvic fluid. Calcified degenerated uterine fibroids. Mild distention of the urinary bladder. Peritoneum/Retroperitoneum: The abdominal aorta is normal in caliber with diffuse atherosclerotic plaque. No retroperitoneal adenopathy. No upper abdominal ascites. Bones/Soft Tissues: No acute osseous or soft tissue abnormality. The bones are diffusely osteopenic. Mild degenerative changes throughout the thoracic and lumbar spine. 1. Acute pancreatitis. Diffuse peripancreatic inflammatory changes with no focal peripancreatic fluid collection. 2. Colonic diverticulosis with no acute features. Mild gastric wall thickening with mucosal enhancement, possibly a mild gastritis. 3. Nonobstructive bilateral nephrolithiasis. Bilateral renal cysts. 4. Mild hepatic steatosis. XR CHEST PORTABLE    Result Date: 7/17/2021  EXAMINATION: ONE XRAY VIEW OF THE CHEST 7/17/2021 10:27 am COMPARISON: 08/25/2020 HISTORY: ORDERING SYSTEM PROVIDED HISTORY: new onset afib TECHNOLOGIST PROVIDED HISTORY: Reason for exam:->new onset afib Reason for Exam: Abdominal Pain (abd 'soreness' started yesterday after lunch. ) Acuity: Acute Type of Exam: Initial FINDINGS: The cardiac silhouette is prominent. Mediastinal and hilar contours are stable. No vascular dilation is appreciated. No consolidation is evident. Stable cardiomegaly. No acute cardiopulmonary disease. Assessment and Plan:  Principal Problem:    A-fib ([address] -Established problem. Stable. Plan: cards to see, rate currently controlled  Active Problems:    Hyperparathyroidism ([address] -Established problem. Stable. Plan: Continue present orders/plan. Gastroesophageal reflux disease without esophagitis  Plan: cont on ppi    Pure hypercholesterolemia -Established problem. Stable. Plan: cont statin    Acute pancreatitis -Established problem. Stable. Plan: cont NPO, ivf, pain meds.  RUQ u/s ordered for PAPO Bull MD  7/18/2021

## 2021-07-18 NOTE — PLAN OF CARE
Pt remains free from falls this shift. Cardizem drip continues at 5mg/hr. HR in 80s-100s. Vital signs are stable. Go Beady in place per pt request. Pt reports no pain when at rest. Pt has had sips with meds, but otherwise has remained NPO. Vitals:    07/18/21 0401   BP: (!) 104/57   Pulse: 92   Resp: 18   Temp: 98.6 °F (37 °C)   SpO2: 92%       Problem: Pain:  Goal: Pain level will decrease  Description: Pain level will decrease  7/18/2021 0549 by Lincoln Pate RN  Outcome: Ongoing     Problem:  Activity:  Goal: Ability to tolerate increased activity will improve  Description: Ability to tolerate increased activity will improve  Outcome: Ongoing     Problem: Cardiac:  Goal: Ability to maintain an adequate cardiac output will improve  Description: Ability to maintain an adequate cardiac output will improve  Outcome: Ongoing     Problem: Coping:  Goal: Level of anxiety will decrease  Description: Level of anxiety will decrease  Outcome: Ongoing     Problem: Health Behavior:  Goal: Ability to manage health-related needs will improve  Description: Ability to manage health-related needs will improve  Outcome: Ongoing     Problem: Safety:  Goal: Ability to remain free from injury will improve  Description: Ability to remain free from injury will improve  Outcome: Ongoing

## 2021-07-18 NOTE — CONSULTS
[facility]  [phone]        Reason for Consultation/Chief Complaint: \"Atrial fibrillation. \"    History of Present Illness:  Pavel Clemons is a 80 y.o. patient who presented to the hospital with complaints of abdominal discomfort. She has been diagnosed with acute pancreatitis. During the course hospitalization she developed tachycardia consistent with atrial fibrillation. She denies chest discomfort or shortness of breath. Past Medical History:   has a past medical history of Allergies, Elevated cholesterol, Hypertension, Reflux, and TIA (transient ischemic attack). Surgical History:   has a past surgical history that includes Kidney stone surgery; Colonoscopy; Skin cancer excision; joint replacement (Right); Cystoscopy; Hemorrhoid surgery; and Excision of Parathyroid Mass (05/11/2017). Social History:   reports that she has never smoked. She has never used smokeless tobacco. She reports that she does not drink alcohol and does not use drugs. Family History:  family history includes Cancer in her mother; Migraines in her brother, mother, and sister. Home Medications:  Were reviewed and are listed in nursing record. and/or listed below  Prior to Admission medications    Medication Sig Start Date End Date Taking?  Authorizing Provider   escitalopram (LEXAPRO) 5 MG tablet Take 5 mg by mouth daily 9/30/20  Yes Historical Provider, MD   fluticasone (FLONASE) 50 MCG/ACT nasal spray fluticasone propionate 50 mcg/actuation nasal spray,suspension   Yes Historical Provider, MD   aspirin 81 MG EC tablet Take 1 tablet by mouth daily 6/19/20  Yes Archana Ortiz MD   vitamin B-12 (CYANOCOBALAMIN) 1000 MCG tablet Take 1,000 mcg by mouth daily   Yes Historical Provider, MD   bisoprolol (ZEBETA) 10 MG tablet Take 10 mg by mouth daily   Yes Historical Provider, MD   amLODIPine (NORVASC) 5 MG tablet Take 10 mg by mouth nightly  1/14/17  Yes Historical Provider, MD   solifenacin (VESICARE) 10 MG tablet Take 10 mg by mouth daily. Yes Historical Provider, MD   Cholecalciferol (VITAMIN D) 1000 UNIT CAPS Take 1,000 Units by mouth daily. Yes Historical Provider, MD   cetirizine (ZYRTEC) 10 MG tablet Take 10 mg by mouth daily. Yes Historical Provider, MD        Allergies:  Benzonatate, Ciprofloxacin, Clarithromycin, Cortisone, Prednisone, and Sulfa antibiotics     Review of Systems:   A complete review of systems has been reviewed and updated today and is negative except as noted in the history of present illness.       Physical Examination:    Vitals:    07/18/21 1200   BP: 104/69   Pulse: 102   Resp: 16   Temp: 99.6 °F (37.6 °C)   SpO2: 92%    Weight: 120 lb (54.4 kg)         General Appearance:  Alert, cooperative, no distress, appears stated age   Head:  Normocephalic, without obvious abnormality, atraumatic   Eyes:  EOMI, conjunctiva/corneas clear       Nose: Nares normal   Throat: Lips normal   Neck: Supple, symmetrical, trachea midline,  no carotid bruit or JVD       Lungs:   Clear to auscultation bilaterally, respirations unlabored   Chest Wall:  No tenderness or deformity   Heart:  Irregularly irregular S1, S2 normal, no significant murmur, rub or gallop   Abdomen:   Soft, non-tender, bowel sounds active all four quadrants,  no masses, no organomegaly           Extremities: Extremities normal, atraumatic, no cyanosis or edema   Pulses: 2+ and symmetric   Skin: Skin color, texture, turgor normal, no rashes or lesions   Pysch: Normal mood and affect   Neurologic: Normal gross motor and sensory exam.         Labs  CBC:   Lab Results   Component Value Date    WBC 12.5 07/18/2021    RBC 4.06 07/18/2021    HGB 12.3 07/18/2021    HCT 36.9 07/18/2021    MCV 90.7 07/18/2021    RDW 13.3 07/18/2021     07/18/2021     CMP:    Lab Results   Component Value Date     07/18/2021    K 3.8 07/18/2021     07/18/2021    CO2 26 07/18/2021    BUN 14 07/18/2021    CREATININE 0.7 07/18/2021    GFRAA >60 07/18/2021    AGRATIO 1.3 07/18/2021    LABGLOM >60 07/18/2021    GLUCOSE 94 07/18/2021    PROT 5.7 07/18/2021    CALCIUM 9.0 07/18/2021    BILITOT 0.9 07/18/2021    ALKPHOS 69 07/18/2021    AST 13 07/18/2021    ALT 6 07/18/2021     LIPIDS: No components found for: TOTAL CHOLESTEROL,  HDL,  LDL,  TRIGLYCERIDES  PT/INR:  No results found for: PTINR  Lab Results   Component Value Date    TROPONINI <0.01 07/17/2021       EKG:  I have reviewed EKG with the following interpretation:  Impression:    17-JUL-2021 10:06:56 [facility] ROUTINE RECORD  Atrial fibrillation with rapid ventricular response  Nonspecific T wave abnormality , probably digitalis effect  Abnormal ECG    Imaging/Procedures:   CT abdomen pelvis 7/17/2021:  1. Acute pancreatitis.  Diffuse peripancreatic inflammatory changes with no   focal peripancreatic fluid collection. 2. Colonic diverticulosis with no acute features.  Mild gastric wall   thickening with mucosal enhancement, possibly a mild gastritis. 3. Nonobstructive bilateral nephrolithiasis.  Bilateral renal cysts. 4. Mild hepatic steatosis. 5.Cardiomegaly with scattered coronary vascular calcification. Assessment/Plan:  Principal Problem:    A-fib (HCC)  Plan: Discontinue Norvasc. Continue Cardizem infusion but would increase dose as hemodynamics allow. Continue Toprol-XL. If needed we may be able to add digoxin. Ideally anticoagulation but she does have a history of recurrent falls and therefore is likely a high fall risk. EP service can further evaluate. 2D echo with Doppler when heart rate is less than 110 bpm.    Active Problems:    Acute pancreatitis  Plan: Per primary service. Coronary artery calcification seen on CT scan  Plan: Consistent with CAD. She has no complaints of chest pain. 2D echo with Doppler. Once her pancreatitis is resolved, would recommend Lexiscan Myoview stress test.  Risk factor modification.         Thank you for allowing us to participate in the care of Caio Tovar. Further evaluation will be based upon the patient's clinical course and testing results. All questions and concerns were addressed to the patient/family. Alternatives to my treatment were discussed. The note was completed using EMR. Every effort was made to ensure accuracy; however, inadvertent computerized transcription errors may be present. Amanda Stoddard M.D.     Critical care time: 35 minutes

## 2021-07-18 NOTE — PLAN OF CARE
Problem: Pain:  Goal: Pain level will decrease  Description: Pain level will decrease  7/18/2021 1911 by Rosa Bermudez RN  Outcome: Ongoing     Problem: Activity:  Goal: Ability to tolerate increased activity will improve  Description: Ability to tolerate increased activity will improve  7/18/2021 1911 by Rosa Bermudez RN  Outcome: Ongoing     Problem:  Activity:  Goal: Expression of feelings of increased energy will increase  Description: Expression of feelings of increased energy will increase  7/18/2021 1911 by Rosa Bermudez RN  Outcome: Ongoing     Problem: Cardiac:  Goal: Ability to maintain an adequate cardiac output will improve  Description: Ability to maintain an adequate cardiac output will improve  7/18/2021 1911 by Jessica Plaza RN  Outcome: Ongoing     Problem: Coping:  Goal: Level of anxiety will decrease  Description: Level of anxiety will decrease  7/18/2021 1911 by Jessica Plaza RN  Outcome: Ongoing     Problem: Coping:  Goal: General experience of comfort will improve  Description: General experience of comfort will improve  7/18/2021 1911 by Jessica Plaza RN  Outcome: Ongoing     Problem: Health Behavior:  Goal: Ability to manage health-related needs will improve  Description: Ability to manage health-related needs will improve  7/18/2021 1911 by Jessica Plaza RN  Outcome: Ongoing     Problem: Safety:  Goal: Ability to remain free from injury will improve  Description: Ability to remain free from injury will improve  7/18/2021 1911 by Jessica Plaza RN  Outcome: Ongoing

## 2021-07-18 NOTE — CONSULTS
Gastroenterology Consult Note      Patient: Monet Turner  : 1935  CSN#:      Date:  2021    Subjective:       History of Present Illness  Patient is a 80 y.o.  female admitted with A-fib ([facility] [I48.91]  A-fib ([facility] [I48.91] who is seen in consult for pancreatitis. She presented with 1 day of worsening abd pain, similar to an episode of pancreatitis years ago. At that time felt to be 2/2 hypercalcemia and eventually had parathyroidectomy. No issues since. Denies diabetes, steatorrhea, weight loss, hx drinking or smoking. Still has gallbladder. No fhx pancreatic cancer. CT with acute pancreatitis and lipase > 3,000. Past Medical History:   Diagnosis Date    Allergies     Elevated cholesterol     Hypertension     Reflux     TIA (transient ischemic attack)       Past Surgical History:   Procedure Laterality Date    COLONOSCOPY      BX OR SECAL POLYP    CYSTOSCOPY      EXCISION OF PARATHYROID MASS  2017    EXCISION OF LEFT PARATHYROID ADENOMA WITH FROZEN SECTION    HEMORRHOID SURGERY      JOINT REPLACEMENT Right     KNEE    KIDNEY STONE SURGERY      SKIN CANCER EXCISION        Past Endoscopic History    Admission Meds  No current facility-administered medications on file prior to encounter. Current Outpatient Medications on File Prior to Encounter   Medication Sig Dispense Refill    escitalopram (LEXAPRO) 5 MG tablet Take 5 mg by mouth daily      fluticasone (FLONASE) 50 MCG/ACT nasal spray fluticasone propionate 50 mcg/actuation nasal spray,suspension      aspirin 81 MG EC tablet Take 1 tablet by mouth daily 30 tablet 3    vitamin B-12 (CYANOCOBALAMIN) 1000 MCG tablet Take 1,000 mcg by mouth daily      bisoprolol (ZEBETA) 10 MG tablet Take 10 mg by mouth daily      amLODIPine (NORVASC) 5 MG tablet Take 10 mg by mouth nightly       solifenacin (VESICARE) 10 MG tablet Take 10 mg by mouth daily.       Cholecalciferol (VITAMIN D) 1000 UNIT CAPS Take 1,000 Units by mouth daily.  cetirizine (ZYRTEC) 10 MG tablet Take 10 mg by mouth daily. Allergies  Allergies   Allergen Reactions    Benzonatate Hives    Ciprofloxacin      Leg swelling    Clarithromycin Hives    Cortisone     Prednisone      Facial swelling and redness    Sulfa Antibiotics Rash, Itching and Hives      Social   Social History     Tobacco Use    Smoking status: Never Smoker    Smokeless tobacco: Never Used   Substance Use Topics    Alcohol use: No        Family History   Problem Relation Age of Onset    Cancer Mother         breast    Migraines Mother    [facility] Migraines Sister     Migraines Brother       No family history of colon cancer, Crohn's disease, or ulcerative colitis. Review of Systems  Pertinent items are noted in HPI. Physical Exam  Blood pressure 104/69, pulse 102, temperature 99.6 °F (37.6 °C), temperature source Oral, resp. rate 16, height 5' 3\" (1.6 m), weight 120 lb (54.4 kg), SpO2 92 %, not currently breastfeeding. General appearance: alert, cooperative, no distress, appears stated age  Eyes: Anicteric  Head: Normocephalic, without obvious abnormality  Lungs: clear to auscultation bilaterally, Normal Effort  Heart: regular rate and rhythm, normal S1 and S2, no murmurs or rubs  Abdomen: soft, mild epigastric ttp. Bowel sounds normal. No masses,  no organomegaly.    Extremities: atraumatic, no cyanosis or edema  Skin: warm and dry, no jaundice  Neuro: Grossly intact, A&OX3      Data Review:    Recent Labs     07/17/21  1026 07/18/21  0503   WBC 12.5* 12.5*   HGB 14.6 12.3   HCT 43.6 36.9   MCV 90.5 90.7    213     Recent Labs     07/17/21  1026 07/18/21  0503   * 135*   K 4.0 3.8   CL 98* 101   CO2 23 26   BUN 17 14   CREATININE 0.7 0.7     Recent Labs     07/17/21  1026 07/18/21  0503   AST 17 13*   ALT 8* 6*   BILITOT 0.7 0.9   ALKPHOS 82 69     Recent Labs     07/17/21  1026 07/18/21  0503   LIPASE >3,000.0* 442.0*   AMYLASE  --  362*     Recent Labs     07/17/21  1026 07/18/21  0502   PROTIME 10.6 13.1*   INR 0.94 1.15*     No results for input(s): PTT in the last 72 hours. No results for input(s): OCCULTBLD in the last 72 hours. Imaging Studies:  Personally reviewed, as in HPI               Assessment:     Principal Problem:    A-fib ([address]  Active Problems:    Coronary artery calcification seen on CT scan    Hyperparathyroidism (HCC)    Gastroesophageal reflux disease without esophagitis    Pure hypercholesterolemia    Acute pancreatitis  Resolved Problems:    * No resolved hospital problems. *    80 y female with pancreatitis. Previous episode about 5 years ago felt to be from hyperparathyroidism related hypercalcemia. No new meds or drugs, denies etoh. RUQ P but LFTs normal. Calcium, TG normal.     Recommendations:   Await results of RUQ us  Continue IV hydration per primary team  Can ADAT to low fat  If prolonged inability to tolerate PO anticipated, should have early nasoenteric tube placed for nutrition  If no biliary etiology identified, should have repeat CT scan in 4 weeks with pancreatic protocol to evaluate for underlying malignancy. After that would consider EUS. Genia Congress.  LYN [address] [facility] [place] [address]  [phone]

## 2021-07-18 NOTE — H&P
History and Physical  Dr. Kris Glass  7/17/2021    PCP: Izzy Madrid MD    Cc:   Chief Complaint   Patient presents with    Abdominal Pain     abd 'soreness' started yesterday after lunch.  Emesis       HPI:  Peter Casper is a 80 y.o. female who has a past medical history of Allergies, Elevated cholesterol, Hypertension, Reflux, and TIA (transient ischemic attack). Patient presents with A-fib [facility]). HPI     a 80 y.o. female who presents to the emergency department complaining of upper abdominal pain starting yesterday after eating lunch. She reports that this feels similar to pancreatitis which she had years ago. She denies alcohol consumption or  diabetes. She still has her gallbladder. The only prior abdominal surgery is a \"kidney stone removal \"procedure. She reports nausea and vomiting. Denies diarrhea, constipation, bloody or black stool. Denies bloody or bilious or coffee-ground emesis. Here in ER, she is found to be in AFib  This appears to be new  Placed on iv cardizem drip, this has controlled rate    Pt also found to be in acute pancreatitits    To be admitted for further workup    Problem list of hospitalization thus far: Active Hospital Problems    Diagnosis     A-fib ([facility] [I48.91]     Acute pancreatitis [K85.90]     Gastroesophageal reflux disease without esophagitis [K21.9]     Hyperparathyroidism ([facility] [E21.3]     Pure hypercholesterolemia [E78.00]          Review of Systems: (1 system for EPF, 2-9 for detailed, 10+ for comprehensive)  Review of Systems   Constitutional: Negative for chills and fever. HENT: Negative for rhinorrhea and tinnitus. Eyes: Negative for pain and redness. Respiratory: Negative for cough and shortness of breath. Cardiovascular: Positive for palpitations. Gastrointestinal: Positive for abdominal pain, nausea and vomiting. Endocrine: Negative for polydipsia and polyphagia. Genitourinary: Negative for enuresis and frequency. Musculoskeletal: Negative for back pain and joint swelling. Allergic/Immunologic: Negative for food allergies. Neurological: Negative for dizziness and numbness. Hematological: Negative for adenopathy. Psychiatric/Behavioral: Negative for behavioral problems and dysphoric mood. Past Medical History:   Past Medical History:   Diagnosis Date    Allergies     Elevated cholesterol     Hypertension     Reflux     TIA (transient ischemic attack)        Past Surgical History:   Past Surgical History:   Procedure Laterality Date    COLONOSCOPY      BX OR SECAL POLYP    CYSTOSCOPY      EXCISION OF PARATHYROID MASS  05/11/2017    EXCISION OF LEFT PARATHYROID ADENOMA WITH FROZEN SECTION    HEMORRHOID SURGERY      JOINT REPLACEMENT Right     KNEE    KIDNEY STONE SURGERY      SKIN CANCER EXCISION         Social History:   Social History     Tobacco History     Smoking Status  Never Smoker    Smokeless Tobacco Use  Never Used          Alcohol History     Alcohol Use Status  No          Drug Use     Drug Use Status  No          Sexual Activity     Sexually Active  Not Asked                Fam History:   Family History   Problem Relation Age of Onset    Cancer Mother         breast    Migraines Mother     Migraines Sister     Migraines Brother        PFSH: The above PMHx, PSHx, SocHx, FamHx has been reviewed by myself. (1 area for detailed, 2-3 for comprehensive)      Code Status: Full Code    Meds - following list of home medications fromelectronic chart has been reviewed by myself  Prior to Admission medications    Medication Sig Start Date End Date Taking?  Authorizing Provider   escitalopram (LEXAPRO) 5 MG tablet Take 5 mg by mouth daily 9/30/20  Yes Historical Provider, MD   fluticasone (FLONASE) 50 MCG/ACT nasal spray fluticasone propionate 50 mcg/actuation nasal spray,suspension   Yes Historical Provider, MD   aspirin 81 MG EC tablet Take 1 tablet by mouth daily 6/19/20  Yes Woodrow Rubio Corina Urbina MD   vitamin B-12 (CYANOCOBALAMIN) 1000 MCG tablet Take 1,000 mcg by mouth daily   Yes Historical Provider, MD   bisoprolol (ZEBETA) 10 MG tablet Take 10 mg by mouth daily   Yes Historical Provider, MD   amLODIPine (NORVASC) 5 MG tablet Take 10 mg by mouth nightly  1/14/17  Yes Historical Provider, MD   solifenacin (VESICARE) 10 MG tablet Take 10 mg by mouth daily. Yes Historical Provider, MD   Cholecalciferol (VITAMIN D) 1000 UNIT CAPS Take 1,000 Units by mouth daily. Yes Historical Provider, MD   cetirizine (ZYRTEC) 10 MG tablet Take 10 mg by mouth daily. Yes Historical Provider, MD         Allergies   Allergen Reactions    Benzonatate Hives    Ciprofloxacin      Leg swelling    Clarithromycin Hives    Cortisone     Prednisone      Facial swelling and redness    Sulfa Antibiotics Rash, Itching and Hives             EXAM: (2-7 system for EPF/Detailed, ?8 for Comprehensive)  /65   Pulse 94   Temp 98.2 °F (36.8 °C) (Oral)   Resp 20   Ht 5' 3\" (1.6 m)   Wt 120 lb (54.4 kg)   SpO2 92%   BMI 21.26 kg/m²   Constitutional: vitals as above: alert, appears stated age and cooperative  Psychiatric: normal insight and judgment, oriented to person, place, time, and general circumstances  Head: Normocephalic, without obvious abnormality, atraumatic  Eyes:lids and lashes normal, conjunctivae and sclerae normal and pupils equal, round, reactive to light and accomodation  EMNT: external ears normal lps normal  Neck: no adenopathy, supple, symmetrical, trachea midline and thyroid not enlarged, symmetric, no tenderness/mass/nodules   Respiratory: clear to auscultation and percussion bilaterally with normal respiratory effort  Cardiovascular: irreg irreg  Gastrointestinal: soft, mildly tender, no rebouns, bs+  Lymphatic:   Extremities: no edema  Skin:No rashes or nodules noted.   Neurologic:    LABS:  Labs Reviewed   CBC WITH AUTO DIFFERENTIAL - Abnormal; Notable for the following components:       Result Value    WBC 12.5 (*)     Neutrophils Absolute 10.5 (*)     All other components within normal limits    Narrative:     Performed at:  [facility] [address] [facility] [employer]   Phone [phone]   COMPREHENSIVE METABOLIC PANEL - Abnormal; Notable for the following components:    Sodium 132 (*)     Chloride 98 (*)     Glucose 119 (*)     ALT 8 (*)     All other components within normal limits    Narrative:     Performed at:  [facility] [address] [address] [employer]   Phone [phone]   LIPASE - Abnormal; Notable for the following components:    Lipase >3,000.0 (*)     All other components within normal limits    Narrative:     Performed at:  [facility] [address] [place],  [place], [facility] [employer]   Phone [phone]   URINE RT REFLEX TO CULTURE - Abnormal; Notable for the following components:    Clarity, UA CLOUDY (*)     Blood, Urine TRACE (*)     Nitrite, Urine POSITIVE (*)     Leukocyte Esterase, Urine LARGE (*)     All other components within normal limits    Narrative:     Performed at:  [facility] [address] [place],  [place], [employer]   Phone 21  - Abnormal; Notable for the following components:    Pro-BNP 2,724 (*)     All other components within normal limits    Narrative:     Performed at:  [facility] [address] [employer]  [place], [facility] [employer]   Phone [phone]   LIPID PANEL - Abnormal; Notable for the following components:    HDL 61 (*)     LDL Calculated 107 (*)     All other components within normal limits    Narrative:     Performed at:  [facility]  [address] [facility] [address]   Phone [phone]   MICROSCOPIC URINALYSIS - Abnormal; Notable for the following components:    Bacteria, UA 4+ (*)     WBC,  (*)     All other components within normal limits    Narrative:     Performed at:  [facility]  [address] [facility], [address]   Phone [phone]   CULTURE, URINE   TROPONIN    Narrative:     Performed at:  [facility]  [address] [facility], [address]   Phone [phone]   APTT    Narrative:     Performed at:  [facility]  [address] [facility], [address]   Phone [phone]   PROTIME-INR    Narrative:     Performed at:  [facility]  [address] [facility], [address]   Phone [phone]   TSH WITH REFLEX    Narrative:     Performed at:  [facility]  [address] [place], [place] [address]   Phone [phone]   PTH, INTACT    Narrative:     Performed at:  [facility] [address], [facility], [address]   Phone [phone]   TROPONIN    Narrative:     Performed at:  [facility]  [address] [facility], [address]   Phone [phone]   TROPONIN   T4, FREE   PTH-RELATED PEPTIDE   COMPREHENSIVE METABOLIC PANEL W/ REFLEX TO MG FOR LOW K   CBC WITH AUTO DIFFERENTIAL   PROTIME-INR         IMAGING:  Imaging results from the ER have been reviewed in the computerized chart. CT ABDOMEN PELVIS W IV CONTRAST Additional Contrast? None    Result Date: 7/17/2021  EXAMINATION: CT OF THE ABDOMEN AND PELVIS WITH CONTRAST 7/17/2021 11:05 am TECHNIQUE: CT of the abdomen and pelvis was performed with the administration of intravenous contrast. Multiplanar reformatted images are provided for review. Dose modulation, iterative reconstruction, and/or weight based adjustment of the mA/kV was utilized to reduce the radiation dose to as low as reasonably achievable.  COMPARISON: 08/25/2020 HISTORY: ORDERING SYSTEM PROVIDED HISTORY: abd pain TECHNOLOGIST PROVIDED HISTORY: Reason for exam:->abd pain Additional Contrast?->None Decision Support Exception - unselect if not a suspected or confirmed emergency medical condition->Emergency Medical Condition (MA) Reason for Exam: Abdominal Pain (abd 'soreness' started yesterday after lunch. ) Acuity: Acute Type of Exam: Initial FINDINGS: Lower Chest: No acute infiltrate at the lung bases. Plate-like atelectasis bilaterally. Cardiomegaly with scattered coronary vascular calcification. Organs: Mild hepatic steatosis with no focal abnormality. The spleen and adrenal glands are unremarkable. No acute biliary findings. No solid renal mass or significant hydronephrosis. Bilateral renal cysts, the largest on the left measuring 3.5 cm. Nonobstructive bilateral nephrolithiasis. Diffuse peripancreatic inflammatory changes consistent with acute pancreatitis. No focal peripancreatic fluid collection or evidence of necrosis. Scattered pancreatic calcifications, the largest in the body measuring 10 mm consistent with chronic pancreatitis. GI/Bowel: No pericolonic inflammatory changes. Scattered diverticulosis with no acute features. Mild retained stool particularly in the ascending colon. No small bowel distension. Mild gastric wall thickening with distension of the proximal stomach. Mild mucosal enhancement. The duodenal sweep is unremarkable. Pelvis: No pelvic mass or free pelvic fluid. Calcified degenerated uterine fibroids. Mild distention of the urinary bladder. Peritoneum/Retroperitoneum: The abdominal aorta is normal in caliber with diffuse atherosclerotic plaque. No retroperitoneal adenopathy. No upper abdominal ascites. Bones/Soft Tissues: No acute osseous or soft tissue abnormality. The bones are diffusely osteopenic. Mild degenerative changes throughout the thoracic and lumbar spine. 1. Acute pancreatitis. Diffuse peripancreatic inflammatory changes with no focal peripancreatic fluid collection.  2. Colonic diverticulosis with no acute features. Mild gastric wall thickening with mucosal enhancement, possibly a mild gastritis. 3. Nonobstructive bilateral nephrolithiasis. Bilateral renal cysts. 4. Mild hepatic steatosis. XR CHEST PORTABLE    Result Date: 7/17/2021  EXAMINATION: ONE XRAY VIEW OF THE CHEST 7/17/2021 10:27 am COMPARISON: 08/25/2020 HISTORY: ORDERING SYSTEM PROVIDED HISTORY: new onset afib TECHNOLOGIST PROVIDED HISTORY: Reason for exam:->new onset afib Reason for Exam: Abdominal Pain (abd 'soreness' started yesterday after lunch. ) Acuity: Acute Type of Exam: Initial FINDINGS: The cardiac silhouette is prominent. Mediastinal and hilar contours are stable. No vascular dilation is appreciated. No consolidation is evident. Stable cardiomegaly. No acute cardiopulmonary disease. EKG: from ER, interpreted by self, [place] at 115. Comparison made to ekg in old chart dated 8/25/20,, there is difference as older study is NSR at [address] MAKING:    Principal Problem:    A-fib ([address] -New Problem to me. Plan: admit. Place on tele. Monitor rhythm on tele. Iv cardizem drip started. Will trend serial troponins. Check thyroid labs. ECHO ordered. Cards asked to see. Monitor BP and HR closely. Further titration of meds based on response to drip. Anticoagulation to be determined   Active Problems:    Hyperparathyroidism ([address] -Established problem. Stable. Plan: Continue present orders/plan. Gastroesophageal reflux disease without esophagitis -Established problem. Stable. Plan: cont on ppi    Pure hypercholesterolemia -Established problem. Stable. Plan: Continue present orders/plan. Acute pancreatitis -New Problem to me. Plan: NPO, IVF, IV pain meds for control. Trend amylase. lipase          Diagnoses as listed above, designated as new or established and plan outlined for each. Data Reviewed:   (1) Lab tests were reviewed or ordered.    (1) Radiology tests were reviewed or ordered. (1) Medical test (Echo, EKG, PFT/travis) were ordered. (1)History was not obtained from someone other than patient  (1) Old records  were reviewed - see HPI/MDM for pertinent details if review done. (2) Case wasdiscussed with another health care provider: Dr Kavitha Lujan  (2) Imaging was viewed by myself. (2) EKG  was viewed by myself. The patient isbeing placed in inpatient status with the expectation of requiring a hospital stay spanning at least two midnights for care and treatment of the problems noted in the problem list.  This determination is also based on thepatients comorbidities and past medical history, the severity and timing of the signs and symptoms upon presentation.         Electronically signed by: Katty Howard MD 7/17/2021

## 2021-07-19 ENCOUNTER — APPOINTMENT (OUTPATIENT)
Dept: ULTRASOUND IMAGING | Age: 86
DRG: 853 | End: 2021-07-19
Payer: MEDICARE

## 2021-07-19 ENCOUNTER — ANESTHESIA EVENT (OUTPATIENT)
Dept: OPERATING ROOM | Age: 86
DRG: 853 | End: 2021-07-19
Payer: MEDICARE

## 2021-07-19 PROBLEM — N39.0 UTI (URINARY TRACT INFECTION): Status: ACTIVE | Noted: 2021-07-19

## 2021-07-19 LAB
ALBUMIN SERPL-MCNC: 2.9 G/DL (ref 3.4–5)
ALP BLD-CCNC: 76 U/L (ref 40–129)
ALT SERPL-CCNC: 6 U/L (ref 10–40)
AMYLASE: 67 U/L (ref 25–115)
ANION GAP SERPL CALCULATED.3IONS-SCNC: 12 MMOL/L (ref 3–16)
AST SERPL-CCNC: 12 U/L (ref 15–37)
BASOPHILS ABSOLUTE: 0 K/UL (ref 0–0.2)
BASOPHILS RELATIVE PERCENT: 0.4 %
BILIRUB SERPL-MCNC: 1 MG/DL (ref 0–1)
BILIRUBIN DIRECT: <0.2 MG/DL (ref 0–0.3)
BILIRUBIN, INDIRECT: ABNORMAL MG/DL (ref 0–1)
BUN BLDV-MCNC: 17 MG/DL (ref 7–20)
CALCIUM SERPL-MCNC: 8.7 MG/DL (ref 8.3–10.6)
CHLORIDE BLD-SCNC: 103 MMOL/L (ref 99–110)
CO2: 20 MMOL/L (ref 21–32)
CREAT SERPL-MCNC: 0.8 MG/DL (ref 0.6–1.2)
EOSINOPHILS ABSOLUTE: 0.1 K/UL (ref 0–0.6)
EOSINOPHILS RELATIVE PERCENT: 0.4 %
GFR AFRICAN AMERICAN: >60
GFR NON-AFRICAN AMERICAN: >60
GLUCOSE BLD-MCNC: 92 MG/DL (ref 70–99)
HCT VFR BLD CALC: 35.2 % (ref 36–48)
HEMOGLOBIN: 11.9 G/DL (ref 12–16)
LIPASE: 22 U/L (ref 13–60)
LV EF: 70 %
LVEF MODALITY: NORMAL
LYMPHOCYTES ABSOLUTE: 1.3 K/UL (ref 1–5.1)
LYMPHOCYTES RELATIVE PERCENT: 10.2 %
MCH RBC QN AUTO: 30.7 PG (ref 26–34)
MCHC RBC AUTO-ENTMCNC: 33.9 G/DL (ref 31–36)
MCV RBC AUTO: 90.5 FL (ref 80–100)
MONOCYTES ABSOLUTE: 1.2 K/UL (ref 0–1.3)
MONOCYTES RELATIVE PERCENT: 9.1 %
NEUTROPHILS ABSOLUTE: 10.4 K/UL (ref 1.7–7.7)
NEUTROPHILS RELATIVE PERCENT: 79.9 %
ORGANISM: ABNORMAL
PDW BLD-RTO: 13.3 % (ref 12.4–15.4)
PLATELET # BLD: 211 K/UL (ref 135–450)
PMV BLD AUTO: 8.6 FL (ref 5–10.5)
POTASSIUM SERPL-SCNC: 3.7 MMOL/L (ref 3.5–5.1)
RBC # BLD: 3.89 M/UL (ref 4–5.2)
SODIUM BLD-SCNC: 135 MMOL/L (ref 136–145)
TOTAL PROTEIN: 5.8 G/DL (ref 6.4–8.2)
URINE CULTURE, ROUTINE: ABNORMAL
WBC # BLD: 13 K/UL (ref 4–11)

## 2021-07-19 PROCEDURE — 6360000002 HC RX W HCPCS: Performed by: INTERNAL MEDICINE

## 2021-07-19 PROCEDURE — 85025 COMPLETE CBC W/AUTO DIFF WBC: CPT

## 2021-07-19 PROCEDURE — 99223 1ST HOSP IP/OBS HIGH 75: CPT | Performed by: SURGERY

## 2021-07-19 PROCEDURE — 2580000003 HC RX 258: Performed by: INTERNAL MEDICINE

## 2021-07-19 PROCEDURE — 83690 ASSAY OF LIPASE: CPT

## 2021-07-19 PROCEDURE — 97161 PT EVAL LOW COMPLEX 20 MIN: CPT

## 2021-07-19 PROCEDURE — APPNB30 APP NON BILLABLE TIME 0-30 MINS: Performed by: NURSE PRACTITIONER

## 2021-07-19 PROCEDURE — 2060000000 HC ICU INTERMEDIATE R&B

## 2021-07-19 PROCEDURE — 6370000000 HC RX 637 (ALT 250 FOR IP): Performed by: INTERNAL MEDICINE

## 2021-07-19 PROCEDURE — 36415 COLL VENOUS BLD VENIPUNCTURE: CPT

## 2021-07-19 PROCEDURE — 93306 TTE W/DOPPLER COMPLETE: CPT

## 2021-07-19 PROCEDURE — 97530 THERAPEUTIC ACTIVITIES: CPT

## 2021-07-19 PROCEDURE — 99222 1ST HOSP IP/OBS MODERATE 55: CPT | Performed by: NURSE PRACTITIONER

## 2021-07-19 PROCEDURE — 97535 SELF CARE MNGMENT TRAINING: CPT

## 2021-07-19 PROCEDURE — 82150 ASSAY OF AMYLASE: CPT

## 2021-07-19 PROCEDURE — APPSS60 APP SPLIT SHARED TIME 46-60 MINUTES: Performed by: NURSE PRACTITIONER

## 2021-07-19 PROCEDURE — 2500000003 HC RX 250 WO HCPCS: Performed by: INTERNAL MEDICINE

## 2021-07-19 PROCEDURE — 6360000002 HC RX W HCPCS: Performed by: NURSE PRACTITIONER

## 2021-07-19 PROCEDURE — 80076 HEPATIC FUNCTION PANEL: CPT

## 2021-07-19 PROCEDURE — 97116 GAIT TRAINING THERAPY: CPT

## 2021-07-19 PROCEDURE — 76705 ECHO EXAM OF ABDOMEN: CPT

## 2021-07-19 PROCEDURE — 80048 BASIC METABOLIC PNL TOTAL CA: CPT

## 2021-07-19 PROCEDURE — 97165 OT EVAL LOW COMPLEX 30 MIN: CPT

## 2021-07-19 PROCEDURE — 6370000000 HC RX 637 (ALT 250 FOR IP): Performed by: NURSE PRACTITIONER

## 2021-07-19 RX ORDER — DIGOXIN 0.25 MG/ML
250 INJECTION INTRAMUSCULAR; INTRAVENOUS ONCE
Status: COMPLETED | OUTPATIENT
Start: 2021-07-19 | End: 2021-07-19

## 2021-07-19 RX ORDER — NITROFURANTOIN 25; 75 MG/1; MG/1
100 CAPSULE ORAL EVERY 12 HOURS SCHEDULED
Status: DISCONTINUED | OUTPATIENT
Start: 2021-07-19 | End: 2021-07-21 | Stop reason: HOSPADM

## 2021-07-19 RX ORDER — FUROSEMIDE 10 MG/ML
20 INJECTION INTRAMUSCULAR; INTRAVENOUS ONCE
Status: COMPLETED | OUTPATIENT
Start: 2021-07-19 | End: 2021-07-19

## 2021-07-19 RX ADMIN — Medication 10 ML: at 21:08

## 2021-07-19 RX ADMIN — CETIRIZINE HYDROCHLORIDE 10 MG: 10 TABLET, FILM COATED ORAL at 09:48

## 2021-07-19 RX ADMIN — NITROFURANTOIN (MONOHYDRATE/MACROCRYSTALS) 100 MG: 75; 25 CAPSULE ORAL at 09:48

## 2021-07-19 RX ADMIN — Medication 10 ML: at 09:52

## 2021-07-19 RX ADMIN — METOPROLOL SUCCINATE 100 MG: 50 TABLET, EXTENDED RELEASE ORAL at 09:48

## 2021-07-19 RX ADMIN — DILTIAZEM HYDROCHLORIDE 30 MG: 30 TABLET, FILM COATED ORAL at 17:45

## 2021-07-19 RX ADMIN — CYANOCOBALAMIN TAB 1000 MCG 1000 MCG: 1000 TAB at 09:48

## 2021-07-19 RX ADMIN — FUROSEMIDE 20 MG: 10 INJECTION, SOLUTION INTRAMUSCULAR; INTRAVENOUS at 00:39

## 2021-07-19 RX ADMIN — DIGOXIN 250 MCG: 0.25 INJECTION INTRAMUSCULAR; INTRAVENOUS at 15:54

## 2021-07-19 RX ADMIN — CHOLECALCIFEROL TAB 25 MCG (1000 UNIT) 1000 UNITS: 25 TAB at 09:48

## 2021-07-19 RX ADMIN — ENOXAPARIN SODIUM 50 MG: 60 INJECTION SUBCUTANEOUS at 21:07

## 2021-07-19 RX ADMIN — TROSPIUM CHLORIDE 20 MG: 20 TABLET, FILM COATED ORAL at 09:48

## 2021-07-19 RX ADMIN — DILTIAZEM HYDROCHLORIDE 10 MG/HR: 5 INJECTION INTRAVENOUS at 14:36

## 2021-07-19 RX ADMIN — DILTIAZEM HYDROCHLORIDE 30 MG: 30 TABLET, FILM COATED ORAL at 23:56

## 2021-07-19 RX ADMIN — ENOXAPARIN SODIUM 50 MG: 60 INJECTION SUBCUTANEOUS at 09:51

## 2021-07-19 RX ADMIN — NITROFURANTOIN (MONOHYDRATE/MACROCRYSTALS) 100 MG: 75; 25 CAPSULE ORAL at 21:08

## 2021-07-19 RX ADMIN — DILTIAZEM HYDROCHLORIDE 10 MG/HR: 5 INJECTION INTRAVENOUS at 00:39

## 2021-07-19 RX ADMIN — ASPIRIN 81 MG: 81 TABLET, COATED ORAL at 09:48

## 2021-07-19 RX ADMIN — DIGOXIN 250 MCG: 0.25 INJECTION INTRAMUSCULAR; INTRAVENOUS at 21:08

## 2021-07-19 ASSESSMENT — PAIN SCALES - GENERAL
PAINLEVEL_OUTOF10: 0

## 2021-07-19 ASSESSMENT — ENCOUNTER SYMPTOMS
APNEA: 0
BACK PAIN: 0
COLOR CHANGE: 0
NAUSEA: 1
ABDOMINAL PAIN: 1
CHEST TIGHTNESS: 0
EYE DISCHARGE: 0
VOMITING: 1
EYE ITCHING: 0
ABDOMINAL DISTENTION: 1

## 2021-07-19 NOTE — PLAN OF CARE
Problem: Pain:  Goal: Pain level will decrease  Description: Pain level will decrease  7/19/2021 1443 by Mily Longoria RN  Outcome: Ongoing  Note: Pt denies pain at this time. No acute distress noted. Will continue to assess.    7/19/2021 0118 by Yoav Camargo  Outcome: Ongoing  Goal: Control of acute pain  Description: Control of acute pain  Outcome: Ongoing  Goal: Control of chronic pain  Description: Control of chronic pain  Outcome: Ongoing

## 2021-07-19 NOTE — PROGRESS NOTES
Gastroenterology Progress Note    Caio Tovar is a 80 y.o. female patient. Principal Problem:    A-fib ([address]  Active Problems:    Coronary artery calcification seen on CT scan    Hyperparathyroidism (HCC)    Gastroesophageal reflux disease without esophagitis    Pure hypercholesterolemia    Acute pancreatitis    UTI (urinary tract infection)  Resolved Problems:    * No resolved hospital problems. *      SUBJECTIVE:  Denies abdominal pain. Feels ready to eat. ROS:  No fever, chills  No chest pain, palpitations  No SOB, cough  Gastrointestinal ROS: see above    Physical    VITALS:  BP 98/63   Pulse 93   Temp 98.4 °F (36.9 °C) (Oral)   Resp 15   Ht 5' 3\" (1.6 m)   Wt 133 lb 6.4 oz (60.5 kg)   SpO2 95%   BMI 23.63 kg/m²   TEMPERATURE:  Current - Temp: 98.4 °F (36.9 °C); Max - Temp  Av.5 °F (36.9 °C)  Min: 97.3 °F (36.3 °C)  Max: 99.6 °F (37.6 °C)    NAD  Regular rate   Lungs CTA Bilaterally  Abdomen soft, ND, NT,  Bowel sounds normal.    Data    Data Review:    Recent Labs     21  1026 21  0503 21  0455   WBC 12.5* 12.5* 13.0*   HGB 14.6 12.3 11.9*   HCT 43.6 36.9 35.2*   MCV 90.5 90.7 90.5    213 211     Recent Labs     21  1026 21  0503 21  0455   * 135* 135*   K 4.0 3.8 3.7   CL 98* 101 103   CO2 23 26 20*   BUN 17 14 17   CREATININE 0.7 0.7 0.8     Recent Labs     21  1026 21  0503 21  0455   AST 17 13* 12*   ALT 8* 6* 6*   BILIDIR  --   --  <0.2   BILITOT 0.7 0.9 1.0   ALKPHOS 82 69 76     Recent Labs     21  1026 21  0503 21  0455   LIPASE >3,000.0* 442.0* 22.0   AMYLASE  --  362* 67     Recent Labs     21  1026 21  0502   PROTIME 10.6 13.1*   INR 0.94 1.15*     No results for input(s): PTT in the last 72 hours. CT abd/pelvis w iv contrast 21  Impression   1. Acute pancreatitis.  Diffuse peripancreatic inflammatory changes with no   focal peripancreatic fluid collection.    2. Colonic diverticulosis with no acute features.  Mild gastric wall   thickening with mucosal enhancement, possibly a mild gastritis. 3. Nonobstructive bilateral nephrolithiasis.  Bilateral renal cysts. 4. Mild hepatic steatosis. ASSESSMENT :    Acute pancreatitis -per CT and lipase over 3000. CT also with calcifications of the pancreas consistent with chronic pancreatitis. No prior tobacco or alcohol use. History of pancreatitis from hypercalcemia a year ago now status post parathyroidectomy. Ca and TG normal. RUQ US pending. Pain improved. CT with gastric wall thickening. ?If this could be secondary to the pancreatitis. PLAN :  -Follow-up right upper quadrant ultrasound. If gallstones noted, would recommend surgery eval.  -Low-fat diet after ultrasound  - f/u with Dr Amira Multani in the office. Can set up for f/u EGD for mild gastric wall thickening at that time. - If no biliary etiology identified, consider CT scan in 4 weeks with pancreatic protocol to evaluate for underlying malignancy. After that would consider EUS. Discussed with Dr. Adali Torres PA-C  [facility]      I have personally performed a face to face diagnostic evaluation on this patient. I have interviewed and examined the patient and I agree with the findings and recommended plan of care. In summary, my findings and plan are the following:  Acute pancreatitis of unclear etiology. US pending. Pt with features of chronic pancreatitis on ct. Pt doing well now  Nontender. [id number] [address]ui for [employer] home if US ok. Will need f/u pancreas imaging and will need outpt EGD for thickened stomach.         Delmon Lefort, MD  [address]

## 2021-07-19 NOTE — PROGRESS NOTES
Dr. Varun Bey notified of ongoing fine crackles heard upon ausculation throughout bilat lung fields compared to previously documented clear throughout. No acute distress noted. Orders received for 1x dose 20 mg IV lasix and decrease IVF to 70ml/hr.

## 2021-07-19 NOTE — PROGRESS NOTES
Physical Therapy    Facility/Department: [address]  Initial Assessment    NAME: Senia Yarbrough  : 1935  MRN: [id number]    Date of Service: 2021    Discharge Recommendations:  PT Equipment Recommendations  Equipment Needed: No  Other: Needs met     Senia Yarbrough scored a 22/24 on the AM-PAC short mobility form. At this time, no further PT is recommended upon discharge due to pt functioning at/near baseline. Recommend patient returns to prior setting with prior services. Assessment   Assessment: Pt is an 81 yo female admitted to [facility] with acute pancreatitis and A-fib with RVR. The patient is independent at baseline and ambulates without an AD. Upon evaluation the patient completes functional mobility independently with supervision only due to poor line safety awareness. The patient presents with mild (R) foot, which is baseline, and compenstates well. The patient has good support from family at home. The patient has no further acute PT needs at this time. Will discharge. Prognosis: Good  Decision Making: Low Complexity  History: See below  Exam: Balance, ambulation  Clinical Presentation: Evolving  PT Education: Goals;PT Role;Plan of Care  Patient Education: D/c recommendations - pt and family verbalize understanding  Barriers to Learning: None  REQUIRES PT FOLLOW UP: No  Activity Tolerance  Activity Tolerance: Patient Tolerated treatment well  Activity Tolerance: HR: 100-115 bpm with activity       Patient Diagnosis(es): The primary encounter diagnosis was Acute pancreatitis, unspecified complication status, unspecified pancreatitis type. A diagnosis of Atrial fibrillation with RVR (HCC) was also pertinent to this visit. has a past medical history of Allergies, Elevated cholesterol, Hypertension, Reflux, and TIA (transient ischemic attack). has a past surgical history that includes Kidney stone surgery; Colonoscopy; Skin cancer excision; joint replacement (Right);  Cystoscopy; Hemorrhoid surgery; and Excision of Parathyroid Mass (05/11/2017). Restrictions  Restrictions/Precautions  Restrictions/Precautions: Fall Risk, Modified Diet (low fall risk; regular - low fat diet)  Position Activity Restriction  Other position/activity restrictions: Pt admitted to ER on 7/17/21 for abdominal pain. Found to be in A-fib with RVR and placed on a cardizem drip. Pt also found to have acute pancreatitis.  PMH: HTN, refluex, elevated cholesterol, TIA, (R) TKA     Vision/Hearing  Vision: Impaired  Vision Exceptions: Wears glasses for reading  Hearing: Within functional limits       Subjective  General  Chart Reviewed: Yes  Family / Caregiver Present: Yes (Daughter)  Follows Commands: Within Functional Limits  General Comment  Comments: Semi-reclined in bed upon arrival.  Subjective  Subjective: Pt states she moves fine, agreeable to PT/OT eval.  Pain Screening  Patient Currently in Pain: Denies  Vital Signs  Patient Currently in Pain: Denies       Orientation  Orientation  Overall Orientation Status: Within Normal Limits     Social/Functional History  Social/Functional History  Lives With: Spouse  Type of Home: House  Home Layout: One level, Laundry in basement (Full flight of steps to basement with one rail)  Home Access: Stairs to enter without rails  Entrance Stairs - Number of Steps: 2  Bathroom Shower/Tub: Walk-in shower  Bathroom Toilet: Handicap height  Bathroom Equipment: Grab bars in shower, Shower chair, Hand-held shower  Home Equipment: Rolling walker, 4 wheeled walker, Cane ([facility] - (pt's spouse sometimes uses the Rollator for community distances))  ADL Assistance: [facility] [facility] [address]: Independent  Homemaking Responsibilities: Yes (But daughter does the grocery shopping)  Ambulation Assistance: Independent  Transfer Assistance: Independent  Active : No  Patient's  Info: Daughter does the driving  Additional Comments: Sleeps in flat bed, pushes grocery chart when she does go. Has hx of (R) foot drop following TKA several years ago, intensity level varies. No falls. Cognition   See OT note    Objective  AROM RLE (degrees)  RLE AROM: WFL  AROM LLE (degrees)  LLE AROM : WFL  Strength RLE  Strength RLE: WFL  Comment: Grossly at least 3/5 as observed through functional mobility, execpt ankle DF which is at least 2/5 as observed 2/2 hx of drop foot following (R) TKA  Strength LLE  Strength LLE: WFL  Comment: Grossly at least 3/5 as observed through functional mobility    Sensation  Overall Sensation Status: WNL     Bed mobility  Supine to Sit: Modified independent (HOB elevated)  Sit to Supine: Unable to assess (Pt up in chair at end of session)  Scooting: Independent     Transfers  Sit to Stand: Independent (from bed, chair, and toilet)  Stand to sit: Independent (to chair and toilet)     Ambulation  Ambulation?: Yes  Ambulation 1  Surface: level tile  Device: No Device  Assistance: Supervision  Quality of Gait: Decreased step length on (R), decreased ankle DF on (R), abrupt weight shift onto (R)  Distance: 10 + 5 + 285 ft  Comments: Pt required supervision due to poor IV line safety awareness. Balance  Posture: Good  Sitting - Static: Good  Sitting - Dynamic: Good  Standing - Static: Good  Standing - Dynamic: Good  Comments: Indep for all static and dynamic sitting/standing balance with ADLs without UE support. Plan   Plan  Times per week: All acute PT needs met, will d/c  Safety Devices  Type of devices:  All fall risk precautions in place, Call light within reach, Chair alarm in place, Left in chair, Nurse notified, Gait belt    AM-PAC Score  AM-PAC Inpatient Mobility Raw Score : 22 (07/19/21 1200)  AM-PAC Inpatient T-Scale Score : 53.28 (07/19/21 1200)  Mobility Inpatient CMS 0-100% Score: 20.91 (07/19/21 1200)  Mobility Inpatient CMS G-Code Modifier : CJ (07/19/21 1200)        Therapy Time   Individual Concurrent Group Co-treatment   Time In 1106         Time Out Carm[address] 327         Timed Code Treatment Minutes: [address] [place], [place], DPT #[id number]

## 2021-07-19 NOTE — CONSULTS
[address]   Electrophysiology Nurse Practitioner  Consult Note  Date: 7/19/2021   Date of admission: 7/17/2021  9:51 AM  Reason for Admission: [facility]) [I48.91]  [facility]) [I48.91]    Consult Requesting Physician: Nicolás Alcocer MD    -Reason for Consultation: Atrial FIbrillation    Chief Complaint   Patient presents with    Abdominal Pain     abd 'soreness' started yesterday after lunch.  Emesis     HISTORY OF PRESENT ILLNESS: History obtained from patient and medical record. Shaina Salcido is a 80 y.o. female with a past medical history of hypertension, hyperlipidemia, TIA and CAD per CT scan who presented with abd pain and emesis from home x 1 day. She has been treated for acute pancreatitis with lipase >3000. Found to be in atrial fibrillation RVR on admission and started on diltiazem gtt. Interval Hx: Today, she is in atrial fibrillation rates  on telemetry. Reports that she came in for abd pain described as band-like epigastric pain that started after eating lunch x 1 day. Denies any accompanying CP or SOB on exertion. She was in atrial fibrillation on admission. Remains on diltiazem gtt at 10 mg/hr. She has no past cardiac hx, she has never seen a cardiologist in the past.  Denies any past or present CP, SOB, palpitations, swelling, dizziness, or syncope. No prior hx of atrial fibrillation, arrhythmia or irregular heart beat. Denies having chest pain, palpitations, shortness of breath, orthopnea, cough, or dizziness at the time of this visit. Patient seen and examined. Clinical notes reviewed. Telemetry reviewed. Assessment and Plan:   Atrial Fibrillation RVR- New diagnosis  - Remains in Atrial fibrillation today rates 's  - On diltiazem gtt and Metoprolol 100 mg daily, BP is soft  - Asymptomatic  - [id number] score: 10 (age, gender, HTN, TIA) ; [id number] Vasc score and anticoagulation discussed. High risk for stroke and thromboembolism.  Anticoagulation is recommended.   ~ Continue therapeutic Lovenox  ~ Will need [address] at discharge  - Afib risk factors including age, HTN, obesity, inactivity and MILTON were discussed with patient. Risk factor modification recommended   ~ TSH 1.3 (7/17/2021)     - Treatment options including cardioversion, rate control strategy, antiarrhythmics, anticoagulation and possible ablation were discussed with patient. Risks, benefits and alternative of each treatment options were explained. All questions answered    ~ Plans for cholecystectomy tomorrow.      ~ Add IV digoxin x 1 and can be repeated in 6 hours if needed    ~ Add PO diltiazem and wean gtt for HR <110    ~ Amiodarone can be considered if rates cannot be adequately controlled, however I have discussed it and she would like to avoid if possible    ~ CV can be done after 3-4 weeks of AC or post surgery if she remains in atrial fibrillation  CAD per CT Scan    - Stress testing has been recommended when pancreatitis resolved  Hypertension   - Controlled. Medication beng adjusted for optimal rate control  Pancreatitis/cholecystitis   - Per primary team and gen surgery   - Plans for surgery tomorrow afternoon    - EP will follow  - Asked to address cardiac risk for surgery. Discussed case with Dr. Se Zhu. Given that her degree of CAD is unknown and her advanced age she would be moderate-high risk to proceed with surgery. Given that she is clinically asymptomatic, proceeding with surgery may be acceptable depending on urgency of her surgery. Dr. Se Zhu will evaluate patient in the morning prior to surgury and order further cardiac testing if needed. All pertinent information and plan of care discussed with the EP physician. All questions and concerns were addressed to the patient/family. Alternatives to my treatment were discussed. I have discussed the above stated plan and the patient verbalized understanding and agreed with the plan.     Discussed plan with patient and nurse. Allergies: Allergies   Allergen Reactions    Benzonatate Hives    Ciprofloxacin      Leg swelling    Clarithromycin Hives    Cortisone     Prednisone      Facial swelling and redness    Sulfa Antibiotics Rash, Itching and Hives       Home Meds:  Prior to Visit Medications    Medication Sig Taking? Authorizing Provider   escitalopram (LEXAPRO) 5 MG tablet Take 5 mg by mouth daily Yes Historical Provider, MD   fluticasone (FLONASE) 50 MCG/ACT nasal spray fluticasone propionate 50 mcg/actuation nasal spray,suspension Yes Historical Provider, MD   aspirin 81 MG EC tablet Take 1 tablet by mouth daily Yes Dar Rosas MD   vitamin B-12 (CYANOCOBALAMIN) 1000 MCG tablet Take 1,000 mcg by mouth daily Yes Historical Provider, MD   bisoprolol (ZEBETA) 10 MG tablet Take 10 mg by mouth daily Yes Historical Provider, MD   amLODIPine (NORVASC) 5 MG tablet Take 10 mg by mouth nightly  Yes Historical Provider, MD   solifenacin (VESICARE) 10 MG tablet Take 10 mg by mouth daily. Yes Historical Provider, MD   Cholecalciferol (VITAMIN D) 1000 UNIT CAPS Take 1,000 Units by mouth daily. Yes Historical Provider, MD   cetirizine (ZYRTEC) 10 MG tablet Take 10 mg by mouth daily. Yes Historical Provider, MD        Past Medical History:  Past Medical History:   Diagnosis Date    Allergies     Elevated cholesterol     Hypertension     Reflux     TIA (transient ischemic attack)         Past Surgical History:    has a past surgical history that includes Kidney stone surgery; Colonoscopy; Skin cancer excision; joint replacement (Right); Cystoscopy; Hemorrhoid surgery; and Excision of Parathyroid Mass (05/11/2017). Social History:  Reviewed. reports that she has never smoked. She has never used smokeless tobacco. She reports that she does not drink alcohol and does not use drugs. Family History:  Reviewed. family history includes Cancer in her mother; Migraines in her brother, mother, and sister.    Denies family history of sudden cardiac death, arrhythmia, premature CAD    Review of System:  · Constitutional: Negative for fever, night sweats, chills, weight changes, or weakness  · Skin: Negative for rash, dry skin, pruritus, bruising, bleeding, blood clots, or changes in skin pigment  · HEENT: Negative for vision changes, ringing in the ears, sore throat, dysphagia, or swollen lymph nodes  · Respiratory: Reviewed in HPI  · Cardiovascular: Reviewed in HPI  · Gastrointestinal: Negative for abdominal pain, N/V/D, constipation, or black/tarry stools  · Genito-Urinary: Negative for dysuria, incontinence, urgency, or hematuria  · Musculoskeletal: Negative for joint swelling, muscle pain, or injuries  · Neurological/Psych: Negative for confusion, seizures, headaches, balance issues or TIA-like symptoms. No anxiety, depression, or insomnia    Physical Examination:  Vitals:    07/19/21 0941   BP: 115/71   Pulse: 104   Resp: 15   Temp: 98.7 °F (37.1 °C)   SpO2: 92%      In: 1672.9 [I.V.:1672.9]  Out: 1450    Wt Readings from Last 3 Encounters:   07/19/21 133 lb 6.4 oz (60.5 kg)   11/05/20 125 lb (56.7 kg)   08/27/20 124 lb 14.4 oz (56.7 kg)       Telemetry: Personally Reviewed  - Sinus rhythm   · Constitutional: Cooperative and in no apparent distress, and appears well nourished/developed  · Skin: Warm and pink; no pallor, cyanosis, bruising, or clubbing  · HEENT: Symmetric and normocephalic. PERRL, EOM intact. Conjunctiva pink with clear sclera. Mucus membranes pink and moist. Teeth intact. Thyroid smooth without nodules or goiter. · Cardiovascular: irregular rate and rhythm. S1/S2 present without murmurs, rubs, or gallops. Peripheral pulses 2+, capillary refill < 3 seconds. No peripheral edema, no elevation of JVP  · Respiratory: Respirations symmetric and unlabored. Lungs clear to auscultation bilaterally, no wheezing, crackles, or rhonchi  · Gastrointestinal: Abdomen soft and rotund.  Bowel sounds normoactive in all quadrants without tenderness or masses. · Musculoskeletal: Bilateral upper and lower extremity strength 5/5 with full ROM  · Neurologic/Psych: Awake and orientated to person, place and time. Calm affect, appropriate mood    Pertinent labs, diagnostic, device, and imaging results reviewed as a part of this visit    Labs:  BMP:   Recent Labs     21  1026 21  0503 21  0455   * 135* 135*   K 4.0 3.8 3.7   CL 98* 101 103   CO2 23 26 20*   BUN 17 14 17   CREATININE 0.7 0.7 0.8     Estimated Creatinine Clearance: 42 mL/min (based on SCr of 0.8 mg/dL). CBC:   Recent Labs     21  1026 21  0503 21  0455   WBC 12.5* 12.5* 13.0*   HGB 14.6 12.3 11.9*   HCT 43.6 36.9 35.2*   MCV 90.5 90.7 90.5    213 211     Thyroid:   Lab Results   Component Value Date    TSH 1.02 02/15/2017     Lipids:  Lab Results   Component Value Date    CHOL 186 2021    HDL 61 2021    TRIG 88 2021     LFTS:   Lab Results   Component Value Date    ALT 6 2021    AST 12 2021    ALKPHOS 76 2021    PROT 5.8 2021    AGRATIO 1.3 2021    BILITOT 1.0 2021     Cardiac Enzymes:   Lab Results   Component Value Date    TROPONINI <0.01 2021    TROPONINI <0.01 2021    TROPONINI <0.01 2020     Coags:   Lab Results   Component Value Date    PROTIME 13.1 2021    INR 1.15 2021     EC2021: Atrial Fibrillation    ECHO:  2021  Summary   -Abnormal arrhythmia and rapid heart rate noted. -Somewhat difficult study due to lung interference.   -Left ventricular cavity size is normal.   -Overall left ventricular systolic function appears hyperdynamic.   -Ejection fraction is visually estimated to be 70%.   -No definitive wall motion abnormalities are seen, although difficult to   determine due to abnormal arrhythmia.   -Indeterminate diastolic function.   -Left appears dilated. -Right atrium is dilated.    -The aortic valve appears sclerotic but opens well. -Mild tricuspid regurgitation. RVSP = 34 mmHG. -IVC size is normal (<2.1 cm) but collapses < 50% with respiration   consistent with elevated RA pressure (8 mmHg). -Appears to be a trivial pericardial effusion. 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    OAB (overactive bladder) 06/17/2020    TIA (transient ischemic attack) 06/17/2020    Senile osteoporosis 01/24/2018    Hyperparathyroidism ([facility] [address] 03/30/2017    Multinodular goiter 03/30/2017    Parathyroid adenoma     Hypertensive disorder 12/13/2016    Primary osteoarthritis of right knee 10/31/2016    History of cardiovascular disorder 04/21/2015    History of arthritis 04/21/2015    Pure hypercholesterolemia 04/21/2015    Hydronephrosis 11/22/2010    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     SRINIVASA Johnston-CNP  [address]   Office: [phone]

## 2021-07-19 NOTE — PROGRESS NOTES
HR fluctuating on telemetry 90s-100s remains atrial fib with diltiazem infusing per MAR at 15mg/hr. Pt denies any pain or SOB at this time. Pt denies further needs. Call light left in reach.

## 2021-07-19 NOTE — PROGRESS NOTES
Progress Note - Dr. Iman Licea - Internal Medicine  PCP: Elsi Mathew MD [id number]  / [place] Munoz [phone]    Hospital Day: 2  Code Status: Full Code  Current Diet: Diet NPO Exceptions are: Sips of Water with Meds        CC: follow up on medical issues    Subjective:   Ashley Rangel is a 80 y.o. female. abd feels better  Pancreatic enzymes improved    Awaiting echo, ruq u/s    Urine cx back  Susceptibility    Staphylococcus epidermidis (1)    Antibiotic Interpretation BELEN Status    nitrofurantoin Sensitive <=16 mcg/mL     oxacillin Resistant >=4 mcg/mL     tetracycline Resistant >=16 mcg/mL     trimethoprim-sulfamethoxazole Sensitive <=10 mcg/mL     vancomycin Sensitive 1 mcg/mL             She denies chest pain, denies shortness of breath, denies nausea,  denies emesis. 10 system Review of Systems is reviewed with patient, and pertinent positives are noted in HPI above . Otherwise, Review of systems is negative. I have reviewed the patient's medical and social history in detail and updated the computerized patient record. To recap: She  has a past medical history of Allergies, Elevated cholesterol, Hypertension, Reflux, and TIA (transient ischemic attack). . She  has a past surgical history that includes Kidney stone surgery; Colonoscopy; Skin cancer excision; joint replacement (Right); Cystoscopy; Hemorrhoid surgery; and Excision of Parathyroid Mass (05/11/2017). . She  reports that she has never smoked. She has never used smokeless tobacco. She reports that she does not drink alcohol and does not use drugs. .        Active Hospital Problems    Diagnosis Date Noted    Coronary artery calcification seen on CT scan [I25.10] 07/18/2021     Priority: Medium    UTI (urinary tract infection) [N39.0] 07/19/2021    A-fib ([facility] [address] [I48.91] 07/17/2021    Acute pancreatitis [K85.90] 07/17/2021    Gastroesophageal reflux disease without esophagitis [K21.9] 06/22/2020    Hyperparathyroidism ([facility] [address] [E21.3] 03/30/2017    Pure hypercholesterolemia [E78.00] 04/21/2015       Current Facility-Administered Medications: 0.9 % sodium chloride infusion, , Intravenous, Continuous  aspirin EC tablet 81 mg, 81 mg, Oral, Daily  butalbital-acetaminophen-caffeine (FIORICET, ESGIC) per tablet 1 tablet, 1 tablet, Oral, Q12H PRN  cetirizine (ZYRTEC) tablet 10 mg, 10 mg, Oral, Daily  Vitamin D (CHOLECALCIFEROL) tablet 1,000 Units, 1,000 Units, Oral, Daily  escitalopram (LEXAPRO) tablet 5 mg, 5 mg, Oral, Daily  fluticasone (FLONASE) 50 MCG/ACT nasal spray 1 spray, 1 spray, Each Nostril, Daily PRN  vitamin B-12 (CYANOCOBALAMIN) tablet 1,000 mcg, 1,000 mcg, Oral, Daily  perflutren lipid microspheres (DEFINITY) injection 1.65 mg, 1.5 mL, Intravenous, ONCE PRN  sodium chloride flush 0.9 % injection 5-40 mL, 5-40 mL, Intravenous, 2 times per day  sodium chloride flush 0.9 % injection 10 mL, 10 mL, Intravenous, PRN  0.9 % sodium chloride infusion, 25 mL, Intravenous, PRN  ondansetron (ZOFRAN-ODT) disintegrating tablet 4 mg, 4 mg, Oral, Q8H PRN **OR** ondansetron (ZOFRAN) injection 4 mg, 4 mg, Intravenous, Q6H PRN  magnesium hydroxide (MILK OF MAGNESIA) 400 MG/5ML suspension 30 mL, 30 mL, Oral, Daily PRN  acetaminophen (TYLENOL) tablet 650 mg, 650 mg, Oral, Q6H PRN **OR** acetaminophen (TYLENOL) suppository 650 mg, 650 mg, Rectal, Q6H PRN  enoxaparin (LOVENOX) injection 50 mg, 1 mg/kg, Subcutaneous, BID  dilTIAZem injection 10 mg, 10 mg, Intravenous, Once **FOLLOWED BY** dilTIAZem 125 mg in dextrose 5 % 125 mL infusion, 5 mg/hr, Intravenous, Continuous  morphine (PF) injection 1 mg, 1 mg, Intravenous, Q4H PRN  metoprolol succinate (TOPROL XL) extended release tablet 100 mg, 100 mg, Oral, Daily  trospium (SANCTURA) tablet 20 mg, 20 mg, Oral, Daily  hydrALAZINE (APRESOLINE) injection 10 mg, 10 mg, Intravenous, Q6H PRN  0.9 % sodium chloride bolus, 500 mL, Intravenous, PRN  potassium chloride (KLOR-CON M) extended release tablet 40 mEq, 40 mEq, Oral, PRN **OR** potassium bicarb-citric acid (EFFER-K) effervescent tablet 40 mEq, 40 mEq, Oral, PRN **OR** potassium chloride 10 mEq/100 mL IVPB (Peripheral Line), 10 mEq, Intravenous, PRN         Objective:  BP 98/63   Pulse 93   Temp 98.4 °F (36.9 °C) (Oral)   Resp 15   Ht 5' 3\" (1.6 m)   Wt 133 lb 6.4 oz (60.5 kg)   SpO2 95%   BMI 23.63 kg/m²      Patient Vitals for the past 24 hrs:   BP Temp Temp src Pulse Resp SpO2 Weight   07/19/21 0716 -- -- -- -- -- -- 133 lb 6.4 oz (60.5 kg)   07/19/21 0512 98/63 98.4 °F (36.9 °C) Oral 93 15 95 % --   07/19/21 0100 103/64 -- -- 76 -- -- --   07/19/21 0042 109/68 -- -- 85 -- -- --   07/19/21 0026 106/65 -- -- 90 -- -- --   07/19/21 0010 101/65 -- -- 89 -- -- --   07/19/21 0005 (!) 105/58 -- -- 83 -- -- --   07/19/21 0000 (!) 102/59 -- -- 73 -- -- --   07/18/21 2355 113/68 -- -- 88 -- -- --   07/18/21 2350 101/65 -- -- 80 -- -- --   07/18/21 2344 99/61 -- -- 79 -- -- --   07/18/21 2341 99/63 98.4 °F (36.9 °C) Oral 76 16 91 % --   07/18/21 2127 109/66 -- -- 101 -- 92 % --   07/18/21 2020 104/64 97.3 °F (36.3 °C) Oral 94 16 93 % --   07/18/21 1815 -- -- -- 105 -- -- --   07/18/21 1715 -- -- -- 127 -- -- --   07/18/21 1649 108/73 98.8 °F (37.1 °C) Oral 134 16 -- --   07/18/21 1200 104/69 99.6 °F (37.6 °C) Oral 102 16 92 % --   07/18/21 0845 112/67 98.6 °F (37 °C) Oral 106 16 92 % --     Patient Vitals for the past 96 hrs (Last 3 readings):   Weight   07/19/21 0716 133 lb 6.4 oz (60.5 kg)   07/17/21 0958 120 lb (54.4 kg)           Intake/Output Summary (Last 24 hours) at 7/19/2021 0821  Last data filed at 7/19/2021 0514  Gross per 24 hour   Intake 1672.87 ml   Output 1450 ml   Net 222.87 ml         Physical Exam:   BP 98/63   Pulse 93   Temp 98.4 °F (36.9 °C) (Oral)   Resp 15   Ht 5' 3\" (1.6 m)   Wt 133 lb 6.4 oz (60.5 kg)   SpO2 95%   BMI 23.63 kg/m²   General appearance: alert, appears stated age and cooperative  Head: Normocephalic, without obvious abnormality, atraumatic  Lungs: clear to auscultation bilaterally  Heart: irreg irreg  Abdomen: soft, non-tender; bowel sounds normal; no masses,  no organomegaly  Extremities: extremities normal, atraumatic, no cyanosis or edema    Labs:  Lab Results   Component Value Date    WBC 13.0 (H) 07/19/2021    HGB 11.9 (L) 07/19/2021    HCT 35.2 (L) 07/19/2021     07/19/2021    CHOL 186 07/17/2021    TRIG 88 07/17/2021    HDL 61 (H) 07/17/2021    ALT 6 (L) 07/19/2021    AST 12 (L) 07/19/2021     (L) 07/19/2021    K 3.7 07/19/2021     07/19/2021    CREATININE 0.8 07/19/2021    BUN 17 07/19/2021    CO2 20 (L) 07/19/2021    TSH 1.02 02/15/2017    INR 1.15 (H) 07/18/2021    LABA1C 5.5 08/26/2020    LABMICR YES 07/17/2021     Lab Results   Component Value Date    TROPONINI <0.01 07/17/2021       Recent Imaging Results are Reviewed:  CT ABDOMEN PELVIS W IV CONTRAST Additional Contrast? None    Result Date: 7/18/2021  EXAMINATION: CT OF THE ABDOMEN AND PELVIS WITH CONTRAST 7/17/2021 11:05 am TECHNIQUE: CT of the abdomen and pelvis was performed with the administration of intravenous contrast. Multiplanar reformatted images are provided for review. Dose modulation, iterative reconstruction, and/or weight based adjustment of the mA/kV was utilized to reduce the radiation dose to as low as reasonably achievable. COMPARISON: 08/25/2020 HISTORY: ORDERING SYSTEM PROVIDED HISTORY: abd pain TECHNOLOGIST PROVIDED HISTORY: Reason for exam:->abd pain Additional Contrast?->None Decision Support Exception - unselect if not a suspected or confirmed emergency medical condition->Emergency Medical Condition (MA) Reason for Exam: Abdominal Pain (abd 'soreness' started yesterday after lunch. ) Acuity: Acute Type of Exam: Initial FINDINGS: Lower Chest: No acute infiltrate at the lung bases. Plate-like atelectasis bilaterally. Cardiomegaly with scattered coronary vascular calcification.  Organs: Mild hepatic steatosis with no focal abnormality. The spleen and adrenal glands are unremarkable. No acute biliary findings. No solid renal mass or significant hydronephrosis. Bilateral renal cysts, the largest on the left measuring 3.5 cm. Nonobstructive bilateral nephrolithiasis. Diffuse peripancreatic inflammatory changes consistent with acute pancreatitis. No focal peripancreatic fluid collection or evidence of necrosis. Scattered pancreatic calcifications, the largest in the body measuring 10 mm consistent with chronic pancreatitis. GI/Bowel: No pericolonic inflammatory changes. Scattered diverticulosis with no acute features. Mild retained stool particularly in the ascending colon. No small bowel distension. Mild gastric wall thickening with distension of the proximal stomach. Mild mucosal enhancement. The duodenal sweep is unremarkable. Pelvis: No pelvic mass or free pelvic fluid. Calcified degenerated uterine fibroids. Mild distention of the urinary bladder. Peritoneum/Retroperitoneum: The abdominal aorta is normal in caliber with diffuse atherosclerotic plaque. No retroperitoneal adenopathy. No upper abdominal ascites. Bones/Soft Tissues: No acute osseous or soft tissue abnormality. The bones are diffusely osteopenic. Mild degenerative changes throughout the thoracic and lumbar spine. 1. Acute pancreatitis. Diffuse peripancreatic inflammatory changes with no focal peripancreatic fluid collection. 2. Colonic diverticulosis with no acute features. Mild gastric wall thickening with mucosal enhancement, possibly a mild gastritis. 3. Nonobstructive bilateral nephrolithiasis. Bilateral renal cysts. 4. Mild hepatic steatosis.      XR CHEST PORTABLE    Result Date: 7/17/2021  EXAMINATION: ONE XRAY VIEW OF THE CHEST 7/17/2021 10:27 am COMPARISON: 08/25/2020 HISTORY: ORDERING SYSTEM PROVIDED HISTORY: new onset afib TECHNOLOGIST PROVIDED HISTORY: Reason for exam:->new onset afib Reason for Exam: Abdominal Pain (abd 'soreness' started yesterday after lunch. ) Acuity: Acute Type of Exam: Initial FINDINGS: The cardiac silhouette is prominent. Mediastinal and hilar contours are stable. No vascular dilation is appreciated. No consolidation is evident. Stable cardiomegaly. No acute cardiopulmonary disease. Assessment and Plan:  Principal Problem:    A-fib ([address] -Established problem. Stable. Still in fib  Plan: cont cardizem. ECHO pending. Appreciated cards eval  Active Problems:    Coronary artery calcification seen on CT scan  Plan: GXT recommended per cards    Hyperparathyroidism [facility])  Plan: Continue present orders/plan. Gastroesophageal reflux disease without esophagitis =Established problem. Stable. Plan: Continue present orders/plan. Pure hypercholesterolemia    Acute pancreatitis -Established problem. Improving. Plan: advance diet as sherice - will try low fat diet today    UTI (urinary tract infection) -Established problem. Stable.   Staph epi  Plan: nitrofurantoin per cx rsults            Caesar Carbone MD  7/19/2021

## 2021-07-19 NOTE — CARE COORDINATION
Discharge Planning Assessment    SW discharge planner met with patient to discuss reason for admission, current living situation, and potential needs at the time of discharge    Demographics/Insurance verified Yes/No: Yes/Medicare    Current type of dwelling: [facility] home. 2 steps to enter. Patient from ECF/SW confirmed with: N/A    Living arrangements: Patient lives with spouse. Level of function/Support: Patient was independent with ADLs and IADLs prior to admission. PCP: Dr. Jenelle Harp    Last Visit to PCP: 2 months age/virtual visit. Also seen by Dr. Jenelle Harp in [facility]. DME: None    Active with any community resources/agencies/skilled home care: None    Medication compliance issues: Patient able to administer. Financial issues that could impact healthcare: None    Tentative discharge plan: PT/OT: no further PT/OT is recommended upon discharge due to pt functioning at/near baseline. Patient anticipates discharging to home with no further needs. Discussed and provided facilities of choice if transition to a skilled nursing facility is required at the time of discharge    Discussed with patient and/or family that on the day of discharge home tentative time of discharge will be between 10 AM and noon. Transportation at the time of discharge: Family will provide transportation.     Electronically signed by JR Mckeon on 7/19/2021 at 2:54 PM

## 2021-07-19 NOTE — PROGRESS NOTES
Occupational Therapy   Occupational Therapy Initial Assessment/Discharge   Date: 2021   Patient Name: Ashley Rangel  MRN: [id number]     : 1935    Date of Service: 2021    Discharge Recommendations:  Home with assist PRN  OT Equipment Recommendations  Equipment Needed: No     Ashley Rangel scored a 23/24 on the AM-PAC ADL Inpatient form. At this time, no further OT is recommended upon discharge due to baseline independence. Recommend patient returns to prior setting with prior services. Assessment   Assessment: Pt functioning at baseline with ADLs and mobility. No additonal acute OT needs at this time. PT safe to d/c home with PRN assist.  Prognosis: Good  Decision Making: Low Complexity  OT Education: OT Role;Plan of Care  REQUIRES OT FOLLOW UP: No  Activity Tolerance  Activity Tolerance: Patient Tolerated treatment well  Activity Tolerance: Pt with HR ranging from 95-120s with activity; RN aware  Safety Devices  Safety Devices in place: Yes  Type of devices: All fall risk precautions in place; Left in chair;Chair alarm in place;Call light within reach           Patient Diagnosis(es): The primary encounter diagnosis was Acute pancreatitis, unspecified complication status, unspecified pancreatitis type. A diagnosis of Atrial fibrillation with RVR (HCC) was also pertinent to this visit. has a past medical history of Allergies, Elevated cholesterol, Hypertension, Reflux, and TIA (transient ischemic attack). has a past surgical history that includes Kidney stone surgery; Colonoscopy; Skin cancer excision; joint replacement (Right); Cystoscopy; Hemorrhoid surgery; and Excision of Parathyroid Mass (2017). Restrictions  Restrictions/Precautions  Restrictions/Precautions: Fall Risk, Modified Diet (low fall risk; regular - low fat diet)  Position Activity Restriction  Other position/activity restrictions: Pt admitted to ER on 21 for abdominal pain.  Found to be in A-fib with RVR and placed on a cardizem drip. Pt also found to have acute pancreatitis. PMH: HTN, refluex, elevated cholesterol, TIA, (R) TKA    Subjective   General  Chart Reviewed: Yes, Orders, Imaging, History and Physical  Patient assessed for rehabilitation services?: Yes  Family / Caregiver Present: Yes  Diagnosis: A fib  Subjective  Subjective: Pt pleasant and agreeable to OT tx session. Patient Currently in Pain: Denies  Vital Signs  Patient Currently in Pain: Denies  Social/Functional History  Social/Functional History  Lives With: Spouse  Type of Home: House  Home Layout: One level, Laundry in basement (Full flight of steps to basement with one rail)  Home Access: Stairs to enter without rails  Entrance Stairs - Number of Steps: 2  Bathroom Shower/Tub: Walk-in shower  Bathroom Toilet: Handicap height  Bathroom Equipment: Grab bars in shower, Shower chair, Hand-held shower  Home Equipment: Rolling walker, 4 wheeled walker, Cane ([facility] - (pt's spouse sometimes uses the Rollator for community distances))  ADL Assistance: [address] [place] [address] Avenue: Independent  Homemaking Responsibilities: Yes (But daughter does the grocery shopping)  Ambulation Assistance: Independent  Transfer Assistance: Independent  Active : No  Patient's  Info: Daughter does the driving  Additional Comments: Sleeps in flat bed, pushes grocery chart when she does go. Has hx of (R) foot drop following TKA several years ago, intensity level varies. No falls.        Objective   Vision: Impaired  Vision Exceptions: Wears glasses for reading  Hearing: Within functional limits    Orientation  Overall Orientation Status: Within Functional Limits  Observation/Palpation  Observation: rounded shoulders  Balance  Sitting Balance: Independent  Standing Balance: Supervision  Functional Mobility  Functional - Mobility Device: No device  Activity: Other (>250ft)  Assist Level: Supervision  Functional Mobility Comments: supervision for safety of IV management  Toilet Transfers  Toilet Transfers Comments: declined the need for toileting  ADL  Feeding: Independent  Grooming: Independent  LE Dressing: Independent  Tone RUE  RUE Tone: Normotonic  Tone LUE  LUE Tone: Normotonic  Coordination  Movements Are Fluid And Coordinated: Yes     Bed mobility  Supine to Sit: Modified independent  Sit to Supine: Unable to assess  Scooting: Independent  Transfers  Sit to stand: Independent  Stand to sit:  Independent     Cognition  Overall Cognitive Status: WFL        Sensation  Overall Sensation Status: WNL           LUE Strength  Gross LUE Strength: WFL  RUE Strength  Gross RUE Strength: WFL                   Plan   Plan  Times per week: eval and d/c      AM-PAC Score        AM-PAC Inpatient Daily Activity Raw Score: 23 (07/19/21 1251)  AM-PAC Inpatient ADL T-Scale Score : 51.12 (07/19/21 1251)  ADL Inpatient CMS 0-100% Score: 15.86 (07/19/21 1251)  ADL Inpatient CMS G-Code Modifier : CI (07/19/21 1251)    Goals  Short term goals  Time Frame for Short term goals: No acute OT goals identified       Therapy Time   Individual Concurrent Group Co-treatment   Time In 1106         Time Out 1144         Minutes 38         Timed Code Treatment Minutes: [facility], OTR/L

## 2021-07-19 NOTE — PROGRESS NOTES
Physical/Occupational Therapy Attempt  Pt's chart reviewed. Pt leaving floor upon PT/OT arrival. Will follow-up as pt status and schedule allow. No charge.   Johnathon Hsu PT, DPT #[id number]

## 2021-07-19 NOTE — CONSULTS
(Non-Medical):    Physical Activity:     Days of Exercise per Week:     Minutes of Exercise per Session:    Stress:     Feeling of Stress :    Social Connections:     Frequency of Communication with Friends and Family:     Frequency of Social Gatherings with Friends and Family:     Attends Sabianist Services:     Active Member of Clubs or Organizations:     Attends Club or Organization Meetings:     Marital Status:    Intimate Partner Violence:     Fear of Current or Ex-Partner:     Emotionally Abused:     Physically Abused:     Sexually Abused: Allergies: Allergies   Allergen Reactions    Benzonatate Hives    Ciprofloxacin      Leg swelling    Clarithromycin Hives    Cortisone     Prednisone      Facial swelling and redness    Sulfa Antibiotics Rash, Itching and Hives       Prior to Admission medications    Medication Sig Start Date End Date Taking? Authorizing Provider   escitalopram (LEXAPRO) 5 MG tablet Take 5 mg by mouth daily 9/30/20  Yes Historical Provider, MD   fluticasone (FLONASE) 50 MCG/ACT nasal spray fluticasone propionate 50 mcg/actuation nasal spray,suspension   Yes Historical Provider, MD   aspirin 81 MG EC tablet Take 1 tablet by mouth daily 6/19/20  Yes Issac Berry MD   vitamin B-12 (CYANOCOBALAMIN) 1000 MCG tablet Take 1,000 mcg by mouth daily   Yes Historical Provider, MD   bisoprolol (ZEBETA) 10 MG tablet Take 10 mg by mouth daily   Yes Historical Provider, MD   amLODIPine (NORVASC) 5 MG tablet Take 10 mg by mouth nightly  1/14/17  Yes Historical Provider, MD   solifenacin (VESICARE) 10 MG tablet Take 10 mg by mouth daily. Yes Historical Provider, MD   Cholecalciferol (VITAMIN D) 1000 UNIT CAPS Take 1,000 Units by mouth daily. Yes Historical Provider, MD   cetirizine (ZYRTEC) 10 MG tablet Take 10 mg by mouth daily.    Yes Historical Provider, MD       Principal Problem:    A-fib [facility])  Active Problems:    Coronary artery calcification seen on CT scan Hyperparathyroidism ([address]    Gastroesophageal reflux disease without esophagitis    Pure hypercholesterolemia    Acute pancreatitis    UTI (urinary tract infection)  Resolved Problems:    * No resolved hospital problems. *      Blood pressure 115/71, pulse 104, temperature 98.7 °F (37.1 °C), temperature source Oral, resp. rate 15, height 5' 3\" (1.6 m), weight 133 lb 6.4 oz (60.5 kg), SpO2 92 %, not currently breastfeeding. Review of Systems   Constitutional: Positive for activity change and appetite change. HENT: Negative for congestion and dental problem. Eyes: Negative for discharge and itching. Respiratory: Negative for apnea and chest tightness. Cardiovascular: Negative for chest pain and leg swelling. Gastrointestinal: Positive for abdominal distention, abdominal pain, nausea and vomiting. Endocrine: Negative for cold intolerance and heat intolerance. Genitourinary: Negative for difficulty urinating and dyspareunia. Musculoskeletal: Negative for arthralgias and back pain. Skin: Negative for color change and pallor. Allergic/Immunologic: Negative for environmental allergies and food allergies. Neurological: Negative for dizziness and facial asymmetry. Hematological: Negative for adenopathy. Does not bruise/bleed easily. Psychiatric/Behavioral: Negative for agitation and behavioral problems. Physical Exam  Constitutional:       Appearance: She is well-developed. HENT:      Head: Normocephalic and atraumatic. Right Ear: External ear normal.      Left Ear: External ear normal.   Eyes:      Conjunctiva/sclera: Conjunctivae normal.   Cardiovascular:      Rate and Rhythm: Normal rate and regular rhythm. Pulmonary:      Effort: Pulmonary effort is normal.      Breath sounds: Normal breath sounds. Abdominal:      General: There is no distension. Palpations: Abdomen is soft. Tenderness: There is no abdominal tenderness.    Musculoskeletal:         General: Normal range of motion. Cervical back: Normal range of motion and neck supple. Skin:     General: Skin is warm and dry. Neurological:      Mental Status: She is alert and oriented to person, place, and time. Psychiatric:         Behavior: Behavior normal.         Assessment:  Pleasant 54-year-old female who presented to the ER 2 days ago with a 1-day history of pain across the upper abdomen which radiates to the back and has been associated with nausea and vomiting. She was admitted and has been treated conservatively for acute pancreatitis. On current physical examination there is no residual abdominal tenderness indicating clinical resolution of the pancreatitis. She has a history of pancreatitis about 4 years ago which was felt to be secondary to hypercalcemia. She subsequently underwent a parathyroidectomy. Her current calcium level is normal at 8.7. Current triglyceride level is also normal and the patient does not take alcohol. Right upper quadrant also shows gallbladder sludge and a 0.4 cm gallbladder polyp. The pattern of lipase elevation and quick resolution are consistent with biliary pancreatitis. Plan: We will plan for laparoscopic cholecystectomy with intraoperative cholangiogram tomorrow afternoon if the patient is felt to be an appropriate candidate from a cardiac risk perspective. Patient explained the risks,benefits and possible complications including bleeding, bowel injury, cbd injury or hepatic artery injury.             Jayme Apley, MD  7/19/2021

## 2021-07-19 NOTE — PLAN OF CARE
Problem: Pain:  Goal: Pain level will decrease  7/19/2021 0118 by Kady Jean  Outcome: Ongoing    Problem:  Activity:  Goal: Ability to tolerate increased activity will improve  7/19/2021 0118 by Kady Jean  Outcome: Ongoing     Problem: Cardiac:  Goal: Ability to maintain an adequate cardiac output will improve  7/19/2021 0118 by Kady Jean  Outcome: Ongoing     Problem: Cardiac:  Goal: Complications related to the disease process, condition or treatment will be avoided or minimized  Outcome: Ongoing     Problem: Coping:  Goal: Level of anxiety will decrease  7/19/2021 0118 by Kady Jean  Outcome: Ongoing     Problem: Health Behavior:  Goal: Ability to manage health-related needs will improve  7/19/2021 0118 by Kady Jean  Outcome: Ongoing     Problem: Safety:  Goal: Ability to remain free from injury will improve  7/19/2021 0118 by Kady Jean  Outcome: Ongoing

## 2021-07-19 NOTE — PLAN OF CARE
[facility]        Assessment & Plan of Care    History of Present Illness: Ms. Yordan Cleaning is a 80 y.o. female who presented to the ED 2 days ago with a 1-day history of constant, severe (9 out of 10) upper abdominal pain that radiated across the upper abdomen and into the back. She described the pain as a soreness. The pain began after eating lunch--had a [employer] low-fat meal.  No alleviating or aggravating factors noted. Associated nausea and vomiting. She denies fevers, chills, prior episodes of postprandial pain, jaundice, diarrhea, constipation, hematochezia, melena, urinary symptoms, chest pain, or SOB. She report one previous episode of pancreatitis about 4 years earlier that was attributed to hypercalcemia and she subsequently underwent parathyroidectomy. Her only prior abdominal surgery was kidney stone surgery at age 23. Medical history includes hypertension, hyperlipemia, TIA, complex migraine, and new onset atrial fibrillation (this admission, cardiology following). No blood thinners. No alcohol use. Nonsmoker. At present, her pain/symptoms have resolved and she is feeling back to her baseline. Tolerating low fat diet. Physical Exam:  CONSTITUTIONAL:  alert, no apparent distress and normal weight  NEUROLOGIC:  Mental Status Exam:  Level of Alertness:   awake  Orientation:   person, place, time  EYES:  sclera clear  ENT:  normocepalic, without obvious abnormality  NECK:  supple, symmetrical, trachea midline  LUNGS:  clear to auscultation  CARDIOVASCULAR:  regularly irregular rhythm and no murmur noted  ABDOMEN: soft, mildly distended, non-tender, voluntary guarding absent, no masses palpated, normal bowel sounds,  scars noted--left flank, and hernia absent  Extremities: no edema  SKIN:  no bruising or bleeding and normal skin color, texture, turgor    Assessment:  Biliary pancreatitis  Atrial fibrillation  Hypertension    Plan:  1.  Tentatively

## 2021-07-20 ENCOUNTER — APPOINTMENT (OUTPATIENT)
Dept: GENERAL RADIOLOGY | Age: 86
DRG: 853 | End: 2021-07-20
Payer: MEDICARE

## 2021-07-20 ENCOUNTER — ANESTHESIA (OUTPATIENT)
Dept: OPERATING ROOM | Age: 86
DRG: 853 | End: 2021-07-20
Payer: MEDICARE

## 2021-07-20 VITALS
OXYGEN SATURATION: 98 % | RESPIRATION RATE: 1 BRPM | SYSTOLIC BLOOD PRESSURE: 155 MMHG | DIASTOLIC BLOOD PRESSURE: 80 MMHG | TEMPERATURE: 98.2 F

## 2021-07-20 LAB
AMYLASE: 28 U/L (ref 25–115)
ANION GAP SERPL CALCULATED.3IONS-SCNC: 12 MMOL/L (ref 3–16)
BASOPHILS ABSOLUTE: 0 K/UL (ref 0–0.2)
BASOPHILS RELATIVE PERCENT: 0.2 %
BUN BLDV-MCNC: 16 MG/DL (ref 7–20)
CALCIUM SERPL-MCNC: 8.5 MG/DL (ref 8.3–10.6)
CHLORIDE BLD-SCNC: 104 MMOL/L (ref 99–110)
CO2: 21 MMOL/L (ref 21–32)
CREAT SERPL-MCNC: 0.6 MG/DL (ref 0.6–1.2)
EOSINOPHILS ABSOLUTE: 0.1 K/UL (ref 0–0.6)
EOSINOPHILS RELATIVE PERCENT: 0.7 %
GFR AFRICAN AMERICAN: >60
GFR NON-AFRICAN AMERICAN: >60
GLUCOSE BLD-MCNC: 120 MG/DL (ref 70–99)
HCT VFR BLD CALC: 37.4 % (ref 36–48)
HEMOGLOBIN: 12.9 G/DL (ref 12–16)
LIPASE: 17 U/L (ref 13–60)
LYMPHOCYTES ABSOLUTE: 1.1 K/UL (ref 1–5.1)
LYMPHOCYTES RELATIVE PERCENT: 10.3 %
MCH RBC QN AUTO: 30.7 PG (ref 26–34)
MCHC RBC AUTO-ENTMCNC: 34.4 G/DL (ref 31–36)
MCV RBC AUTO: 89.4 FL (ref 80–100)
MONOCYTES ABSOLUTE: 1.1 K/UL (ref 0–1.3)
MONOCYTES RELATIVE PERCENT: 9.8 %
NEUTROPHILS ABSOLUTE: 8.7 K/UL (ref 1.7–7.7)
NEUTROPHILS RELATIVE PERCENT: 79 %
PDW BLD-RTO: 13.3 % (ref 12.4–15.4)
PLATELET # BLD: 190 K/UL (ref 135–450)
PMV BLD AUTO: 9.2 FL (ref 5–10.5)
POTASSIUM SERPL-SCNC: 3.2 MMOL/L (ref 3.5–5.1)
RBC # BLD: 4.18 M/UL (ref 4–5.2)
SODIUM BLD-SCNC: 137 MMOL/L (ref 136–145)
WBC # BLD: 11 K/UL (ref 4–11)

## 2021-07-20 PROCEDURE — 6360000002 HC RX W HCPCS: Performed by: NURSE ANESTHETIST, CERTIFIED REGISTERED

## 2021-07-20 PROCEDURE — 2580000003 HC RX 258: Performed by: INTERNAL MEDICINE

## 2021-07-20 PROCEDURE — 3600000004 HC SURGERY LEVEL 4 BASE: Performed by: SURGERY

## 2021-07-20 PROCEDURE — 2500000003 HC RX 250 WO HCPCS: Performed by: SURGERY

## 2021-07-20 PROCEDURE — C1894 INTRO/SHEATH, NON-LASER: HCPCS | Performed by: SURGERY

## 2021-07-20 PROCEDURE — 3700000001 HC ADD 15 MINUTES (ANESTHESIA): Performed by: SURGERY

## 2021-07-20 PROCEDURE — 7100000001 HC PACU RECOVERY - ADDTL 15 MIN: Performed by: SURGERY

## 2021-07-20 PROCEDURE — 47563 LAPARO CHOLECYSTECTOMY/GRAPH: CPT | Performed by: SURGERY

## 2021-07-20 PROCEDURE — 2060000000 HC ICU INTERMEDIATE R&B

## 2021-07-20 PROCEDURE — 2500000003 HC RX 250 WO HCPCS: Performed by: NURSE ANESTHETIST, CERTIFIED REGISTERED

## 2021-07-20 PROCEDURE — 6370000000 HC RX 637 (ALT 250 FOR IP): Performed by: SURGERY

## 2021-07-20 PROCEDURE — 83690 ASSAY OF LIPASE: CPT

## 2021-07-20 PROCEDURE — 85025 COMPLETE CBC W/AUTO DIFF WBC: CPT

## 2021-07-20 PROCEDURE — 82150 ASSAY OF AMYLASE: CPT

## 2021-07-20 PROCEDURE — APPNB30 APP NON BILLABLE TIME 0-30 MINS: Performed by: NURSE PRACTITIONER

## 2021-07-20 PROCEDURE — 2709999900 HC NON-CHARGEABLE SUPPLY: Performed by: SURGERY

## 2021-07-20 PROCEDURE — APPSS15 APP SPLIT SHARED TIME 0-15 MINUTES: Performed by: NURSE PRACTITIONER

## 2021-07-20 PROCEDURE — 3600000014 HC SURGERY LEVEL 4 ADDTL 15MIN: Performed by: SURGERY

## 2021-07-20 PROCEDURE — 0FT44ZZ RESECTION OF GALLBLADDER, PERCUTANEOUS ENDOSCOPIC APPROACH: ICD-10-PCS | Performed by: SURGERY

## 2021-07-20 PROCEDURE — 3700000000 HC ANESTHESIA ATTENDED CARE: Performed by: SURGERY

## 2021-07-20 PROCEDURE — 6370000000 HC RX 637 (ALT 250 FOR IP): Performed by: INTERNAL MEDICINE

## 2021-07-20 PROCEDURE — 36415 COLL VENOUS BLD VENIPUNCTURE: CPT

## 2021-07-20 PROCEDURE — 6360000004 HC RX CONTRAST MEDICATION: Performed by: SURGERY

## 2021-07-20 PROCEDURE — 88304 TISSUE EXAM BY PATHOLOGIST: CPT

## 2021-07-20 PROCEDURE — 99233 SBSQ HOSP IP/OBS HIGH 50: CPT | Performed by: NURSE PRACTITIONER

## 2021-07-20 PROCEDURE — 6370000000 HC RX 637 (ALT 250 FOR IP): Performed by: NURSE PRACTITIONER

## 2021-07-20 PROCEDURE — 2720000010 HC SURG SUPPLY STERILE: Performed by: SURGERY

## 2021-07-20 PROCEDURE — 7100000000 HC PACU RECOVERY - FIRST 15 MIN: Performed by: SURGERY

## 2021-07-20 PROCEDURE — 80048 BASIC METABOLIC PNL TOTAL CA: CPT

## 2021-07-20 PROCEDURE — 2580000003 HC RX 258: Performed by: SURGERY

## 2021-07-20 PROCEDURE — 74300 X-RAY BILE DUCTS/PANCREAS: CPT

## 2021-07-20 PROCEDURE — BF131ZZ FLUOROSCOPY OF GALLBLADDER AND BILE DUCTS USING LOW OSMOLAR CONTRAST: ICD-10-PCS | Performed by: SURGERY

## 2021-07-20 PROCEDURE — 6360000002 HC RX W HCPCS: Performed by: ANESTHESIOLOGY

## 2021-07-20 PROCEDURE — 6360000002 HC RX W HCPCS: Performed by: SURGERY

## 2021-07-20 RX ORDER — ROCURONIUM BROMIDE 10 MG/ML
INJECTION, SOLUTION INTRAVENOUS PRN
Status: DISCONTINUED | OUTPATIENT
Start: 2021-07-20 | End: 2021-07-20 | Stop reason: SDUPTHER

## 2021-07-20 RX ORDER — SODIUM CHLORIDE 0.9 % (FLUSH) 0.9 %
10 SYRINGE (ML) INJECTION EVERY 12 HOURS SCHEDULED
Status: CANCELLED | OUTPATIENT
Start: 2021-07-20

## 2021-07-20 RX ORDER — LIDOCAINE HYDROCHLORIDE 20 MG/ML
INJECTION, SOLUTION EPIDURAL; INFILTRATION; INTRACAUDAL; PERINEURAL PRN
Status: DISCONTINUED | OUTPATIENT
Start: 2021-07-20 | End: 2021-07-20 | Stop reason: SDUPTHER

## 2021-07-20 RX ORDER — PROCHLORPERAZINE EDISYLATE 5 MG/ML
5 INJECTION INTRAMUSCULAR; INTRAVENOUS
Status: DISCONTINUED | OUTPATIENT
Start: 2021-07-20 | End: 2021-07-20 | Stop reason: HOSPADM

## 2021-07-20 RX ORDER — ONDANSETRON 2 MG/ML
INJECTION INTRAMUSCULAR; INTRAVENOUS PRN
Status: DISCONTINUED | OUTPATIENT
Start: 2021-07-20 | End: 2021-07-20 | Stop reason: SDUPTHER

## 2021-07-20 RX ORDER — FENTANYL CITRATE 50 UG/ML
INJECTION, SOLUTION INTRAMUSCULAR; INTRAVENOUS PRN
Status: DISCONTINUED | OUTPATIENT
Start: 2021-07-20 | End: 2021-07-20 | Stop reason: SDUPTHER

## 2021-07-20 RX ORDER — METOPROLOL TARTRATE 5 MG/5ML
INJECTION INTRAVENOUS PRN
Status: DISCONTINUED | OUTPATIENT
Start: 2021-07-20 | End: 2021-07-20 | Stop reason: SDUPTHER

## 2021-07-20 RX ORDER — LABETALOL HYDROCHLORIDE 5 MG/ML
5 INJECTION, SOLUTION INTRAVENOUS EVERY 10 MIN PRN
Status: DISCONTINUED | OUTPATIENT
Start: 2021-07-20 | End: 2021-07-20 | Stop reason: HOSPADM

## 2021-07-20 RX ORDER — HYDROMORPHONE HCL 110MG/55ML
0.25 PATIENT CONTROLLED ANALGESIA SYRINGE INTRAVENOUS EVERY 5 MIN PRN
Status: DISCONTINUED | OUTPATIENT
Start: 2021-07-20 | End: 2021-07-20 | Stop reason: HOSPADM

## 2021-07-20 RX ORDER — SODIUM CHLORIDE, SODIUM LACTATE, POTASSIUM CHLORIDE, CALCIUM CHLORIDE 600; 310; 30; 20 MG/100ML; MG/100ML; MG/100ML; MG/100ML
INJECTION, SOLUTION INTRAVENOUS CONTINUOUS PRN
Status: COMPLETED | OUTPATIENT
Start: 2021-07-20 | End: 2021-07-20

## 2021-07-20 RX ORDER — SODIUM CHLORIDE 0.9 % (FLUSH) 0.9 %
10 SYRINGE (ML) INJECTION PRN
Status: CANCELLED | OUTPATIENT
Start: 2021-07-20

## 2021-07-20 RX ORDER — FENTANYL CITRATE 50 UG/ML
25 INJECTION, SOLUTION INTRAMUSCULAR; INTRAVENOUS EVERY 5 MIN PRN
Status: DISCONTINUED | OUTPATIENT
Start: 2021-07-20 | End: 2021-07-20 | Stop reason: HOSPADM

## 2021-07-20 RX ORDER — SODIUM CHLORIDE, SODIUM LACTATE, POTASSIUM CHLORIDE, CALCIUM CHLORIDE 600; 310; 30; 20 MG/100ML; MG/100ML; MG/100ML; MG/100ML
INJECTION, SOLUTION INTRAVENOUS CONTINUOUS
Status: CANCELLED | OUTPATIENT
Start: 2021-07-20

## 2021-07-20 RX ORDER — MAGNESIUM SULFATE HEPTAHYDRATE 500 MG/ML
INJECTION, SOLUTION INTRAMUSCULAR; INTRAVENOUS PRN
Status: DISCONTINUED | OUTPATIENT
Start: 2021-07-20 | End: 2021-07-20 | Stop reason: SDUPTHER

## 2021-07-20 RX ORDER — SUCCINYLCHOLINE/SOD CL,ISO/PF 200MG/10ML
SYRINGE (ML) INTRAVENOUS PRN
Status: DISCONTINUED | OUTPATIENT
Start: 2021-07-20 | End: 2021-07-20 | Stop reason: SDUPTHER

## 2021-07-20 RX ORDER — HYDROCODONE BITARTRATE AND ACETAMINOPHEN 5; 325 MG/1; MG/1
1 TABLET ORAL
Status: ACTIVE | OUTPATIENT
Start: 2021-07-20 | End: 2021-07-20

## 2021-07-20 RX ORDER — PROPOFOL 10 MG/ML
INJECTION, EMULSION INTRAVENOUS PRN
Status: DISCONTINUED | OUTPATIENT
Start: 2021-07-20 | End: 2021-07-20 | Stop reason: SDUPTHER

## 2021-07-20 RX ORDER — LIDOCAINE HYDROCHLORIDE 10 MG/ML
1 INJECTION, SOLUTION EPIDURAL; INFILTRATION; INTRACAUDAL; PERINEURAL
Status: CANCELLED | OUTPATIENT
Start: 2021-07-20 | End: 2021-07-20

## 2021-07-20 RX ORDER — ONDANSETRON 2 MG/ML
4 INJECTION INTRAMUSCULAR; INTRAVENOUS
Status: DISCONTINUED | OUTPATIENT
Start: 2021-07-20 | End: 2021-07-20 | Stop reason: HOSPADM

## 2021-07-20 RX ORDER — HYDRALAZINE HYDROCHLORIDE 20 MG/ML
5 INJECTION INTRAMUSCULAR; INTRAVENOUS EVERY 10 MIN PRN
Status: DISCONTINUED | OUTPATIENT
Start: 2021-07-20 | End: 2021-07-20 | Stop reason: HOSPADM

## 2021-07-20 RX ORDER — BUPIVACAINE HYDROCHLORIDE 2.5 MG/ML
INJECTION, SOLUTION EPIDURAL; INFILTRATION; INTRACAUDAL
Status: COMPLETED | OUTPATIENT
Start: 2021-07-20 | End: 2021-07-20

## 2021-07-20 RX ORDER — MAGNESIUM HYDROXIDE 1200 MG/15ML
LIQUID ORAL CONTINUOUS PRN
Status: COMPLETED | OUTPATIENT
Start: 2021-07-20 | End: 2021-07-20

## 2021-07-20 RX ORDER — HYDROMORPHONE HYDROCHLORIDE 1 MG/ML
0.5 INJECTION, SOLUTION INTRAMUSCULAR; INTRAVENOUS; SUBCUTANEOUS
Status: DISCONTINUED | OUTPATIENT
Start: 2021-07-20 | End: 2021-07-21 | Stop reason: HOSPADM

## 2021-07-20 RX ORDER — SODIUM CHLORIDE 9 MG/ML
25 INJECTION, SOLUTION INTRAVENOUS PRN
Status: CANCELLED | OUTPATIENT
Start: 2021-07-20

## 2021-07-20 RX ADMIN — Medication 10 ML: at 22:05

## 2021-07-20 RX ADMIN — FENTANYL CITRATE 50 MCG: 50 INJECTION, SOLUTION INTRAMUSCULAR; INTRAVENOUS at 16:31

## 2021-07-20 RX ADMIN — ESCITALOPRAM OXALATE 5 MG: 10 TABLET ORAL at 08:31

## 2021-07-20 RX ADMIN — METOPROLOL TARTRATE 1 MG: 1 INJECTION, SOLUTION INTRAVENOUS at 16:47

## 2021-07-20 RX ADMIN — METOPROLOL TARTRATE 1 MG: 1 INJECTION, SOLUTION INTRAVENOUS at 17:02

## 2021-07-20 RX ADMIN — METOPROLOL TARTRATE 1 MG: 1 INJECTION, SOLUTION INTRAVENOUS at 16:43

## 2021-07-20 RX ADMIN — LIDOCAINE HYDROCHLORIDE 60 MG: 20 INJECTION, SOLUTION EPIDURAL; INFILTRATION; INTRACAUDAL; PERINEURAL at 16:24

## 2021-07-20 RX ADMIN — HYDROMORPHONE HYDROCHLORIDE 0.25 MG: 2 INJECTION, SOLUTION INTRAMUSCULAR; INTRAVENOUS; SUBCUTANEOUS at 17:54

## 2021-07-20 RX ADMIN — SUGAMMADEX 200 MG: 100 INJECTION, SOLUTION INTRAVENOUS at 17:12

## 2021-07-20 RX ADMIN — HYDROMORPHONE HYDROCHLORIDE 0.25 MG: 2 INJECTION, SOLUTION INTRAMUSCULAR; INTRAVENOUS; SUBCUTANEOUS at 18:14

## 2021-07-20 RX ADMIN — ONDANSETRON 4 MG: 2 INJECTION INTRAMUSCULAR; INTRAVENOUS at 17:07

## 2021-07-20 RX ADMIN — CHOLECALCIFEROL TAB 25 MCG (1000 UNIT) 1000 UNITS: 25 TAB at 08:31

## 2021-07-20 RX ADMIN — HYDROMORPHONE HYDROCHLORIDE 0.25 MG: 2 INJECTION, SOLUTION INTRAMUSCULAR; INTRAVENOUS; SUBCUTANEOUS at 17:38

## 2021-07-20 RX ADMIN — DILTIAZEM HYDROCHLORIDE 30 MG: 30 TABLET, FILM COATED ORAL at 22:04

## 2021-07-20 RX ADMIN — METOPROLOL TARTRATE 1 MG: 1 INJECTION, SOLUTION INTRAVENOUS at 17:08

## 2021-07-20 RX ADMIN — NITROFURANTOIN (MONOHYDRATE/MACROCRYSTALS) 100 MG: 75; 25 CAPSULE ORAL at 08:50

## 2021-07-20 RX ADMIN — DILTIAZEM HYDROCHLORIDE 30 MG: 30 TABLET, FILM COATED ORAL at 12:00

## 2021-07-20 RX ADMIN — POTASSIUM CHLORIDE 40 MEQ: 20 TABLET, EXTENDED RELEASE ORAL at 08:32

## 2021-07-20 RX ADMIN — METOPROLOL SUCCINATE 100 MG: 50 TABLET, EXTENDED RELEASE ORAL at 08:47

## 2021-07-20 RX ADMIN — Medication 10 ML: at 08:41

## 2021-07-20 RX ADMIN — DILTIAZEM HYDROCHLORIDE 30 MG: 30 TABLET, FILM COATED ORAL at 18:50

## 2021-07-20 RX ADMIN — METOPROLOL TARTRATE 1 MG: 1 INJECTION, SOLUTION INTRAVENOUS at 16:51

## 2021-07-20 RX ADMIN — TROSPIUM CHLORIDE 20 MG: 20 TABLET, FILM COATED ORAL at 08:31

## 2021-07-20 RX ADMIN — DILTIAZEM HYDROCHLORIDE 30 MG: 30 TABLET, FILM COATED ORAL at 05:38

## 2021-07-20 RX ADMIN — MAGNESIUM SULFATE HEPTAHYDRATE 1 G: 500 INJECTION, SOLUTION INTRAMUSCULAR; INTRAVENOUS at 16:24

## 2021-07-20 RX ADMIN — NITROFURANTOIN (MONOHYDRATE/MACROCRYSTALS) 100 MG: 75; 25 CAPSULE ORAL at 22:04

## 2021-07-20 RX ADMIN — SODIUM CHLORIDE: 9 INJECTION, SOLUTION INTRAVENOUS at 15:35

## 2021-07-20 RX ADMIN — PROPOFOL 80 MG: 10 INJECTION, EMULSION INTRAVENOUS at 16:24

## 2021-07-20 RX ADMIN — CYANOCOBALAMIN TAB 1000 MCG 1000 MCG: 1000 TAB at 08:32

## 2021-07-20 RX ADMIN — CEFAZOLIN SODIUM 2000 MG: 10 INJECTION, POWDER, FOR SOLUTION INTRAVENOUS at 16:15

## 2021-07-20 RX ADMIN — Medication 100 MG: at 16:24

## 2021-07-20 RX ADMIN — ASPIRIN 81 MG: 81 TABLET, COATED ORAL at 08:31

## 2021-07-20 RX ADMIN — ROCURONIUM BROMIDE 20 MG: 10 INJECTION, SOLUTION INTRAVENOUS at 16:29

## 2021-07-20 RX ADMIN — CETIRIZINE HYDROCHLORIDE 10 MG: 10 TABLET, FILM COATED ORAL at 08:32

## 2021-07-20 RX ADMIN — FENTANYL CITRATE 50 MCG: 50 INJECTION, SOLUTION INTRAMUSCULAR; INTRAVENOUS at 16:21

## 2021-07-20 RX ADMIN — ROCURONIUM BROMIDE 10 MG: 10 INJECTION, SOLUTION INTRAVENOUS at 16:24

## 2021-07-20 ASSESSMENT — PULMONARY FUNCTION TESTS
PIF_VALUE: 19
PIF_VALUE: 1
PIF_VALUE: 19
PIF_VALUE: 19
PIF_VALUE: 1
PIF_VALUE: 5
PIF_VALUE: 5
PIF_VALUE: 15
PIF_VALUE: 13
PIF_VALUE: 0
PIF_VALUE: 6
PIF_VALUE: 13
PIF_VALUE: 14
PIF_VALUE: 19
PIF_VALUE: 14
PIF_VALUE: 18
PIF_VALUE: 1
PIF_VALUE: 14
PIF_VALUE: 14
PIF_VALUE: 19
PIF_VALUE: 19
PIF_VALUE: 14
PIF_VALUE: 13
PIF_VALUE: 15
PIF_VALUE: 13
PIF_VALUE: 18
PIF_VALUE: 13
PIF_VALUE: 14
PIF_VALUE: 14
PIF_VALUE: 18
PIF_VALUE: 18
PIF_VALUE: 14
PIF_VALUE: 1
PIF_VALUE: 1
PIF_VALUE: 18
PIF_VALUE: 16
PIF_VALUE: 19
PIF_VALUE: 9
PIF_VALUE: 18
PIF_VALUE: 19
PIF_VALUE: 9
PIF_VALUE: 3
PIF_VALUE: 18
PIF_VALUE: 20
PIF_VALUE: 19
PIF_VALUE: 14
PIF_VALUE: 19
PIF_VALUE: 14
PIF_VALUE: 0
PIF_VALUE: 14
PIF_VALUE: 18
PIF_VALUE: 19
PIF_VALUE: 14
PIF_VALUE: 19
PIF_VALUE: 19
PIF_VALUE: 14
PIF_VALUE: 14
PIF_VALUE: 19
PIF_VALUE: 10
PIF_VALUE: 18

## 2021-07-20 ASSESSMENT — PAIN DESCRIPTION - LOCATION
LOCATION: ABDOMEN
LOCATION: ABDOMEN

## 2021-07-20 ASSESSMENT — PAIN SCALES - GENERAL
PAINLEVEL_OUTOF10: 0
PAINLEVEL_OUTOF10: 7
PAINLEVEL_OUTOF10: 9
PAINLEVEL_OUTOF10: 7
PAINLEVEL_OUTOF10: 0
PAINLEVEL_OUTOF10: 9

## 2021-07-20 ASSESSMENT — PAIN DESCRIPTION - ONSET
ONSET: SUDDEN
ONSET: ON-GOING

## 2021-07-20 ASSESSMENT — PAIN DESCRIPTION - ORIENTATION
ORIENTATION: RIGHT
ORIENTATION: RIGHT

## 2021-07-20 ASSESSMENT — PAIN DESCRIPTION - DESCRIPTORS
DESCRIPTORS: SORE
DESCRIPTORS: SORE

## 2021-07-20 ASSESSMENT — PAIN DESCRIPTION - FREQUENCY
FREQUENCY: INTERMITTENT
FREQUENCY: INTERMITTENT

## 2021-07-20 ASSESSMENT — PAIN DESCRIPTION - PAIN TYPE
TYPE: SURGICAL PAIN;ACUTE PAIN
TYPE: ACUTE PAIN;SURGICAL PAIN

## 2021-07-20 ASSESSMENT — PAIN DESCRIPTION - PROGRESSION: CLINICAL_PROGRESSION: GRADUALLY IMPROVING

## 2021-07-20 ASSESSMENT — ENCOUNTER SYMPTOMS: SHORTNESS OF BREATH: 0

## 2021-07-20 ASSESSMENT — PAIN - FUNCTIONAL ASSESSMENT: PAIN_FUNCTIONAL_ASSESSMENT: 0-10

## 2021-07-20 NOTE — PROGRESS NOTES
[address]   Electrophysiology Progress Note   Date: 7/20/2021  Admit Date: 7/17/2021     Reason for follow up: Atrial Fibrillation    Chief Complaint:   Chief Complaint   Patient presents with    Abdominal Pain     abd 'soreness' started yesterday after lunch.  Emesis     History of Present Illness: History obtained from patient and medical record. Monet Turner is a 80 y.o. female with a past medical history of hypertension, hyperlipidemia, TIA and CAD per CT scan who presented with abd pain and emesis from home x 1 day. She has been treated for acute pancreatitis with lipase >3000. Found to be in atrial fibrillation RVR on admission and started on diltiazem gtt. Denies any accompanying CP or SOB on exertion. She was in atrial fibrillation on admission. Denies any past or present CP, SOB, palpitations, swelling, dizziness, or syncope. No prior hx of atrial fibrillation, arrhythmia or irregular heart beat. Interval Hx: Today, she is remains in atrail fibrillation CVR 70-90's. Diltiazem gtt stopped last night. Tolerating oral diltiazem and digoxin. No new complaints today. No major events overnight. Denies having chest pain, palpitations, shortness of breath, orthopnea or dizziness. Patient seen and examined. Clinical notes reviewed. Telemetry reviewed.     Problem List:   Patient Active Problem List    Diagnosis Date Noted    Coronary artery calcification seen on CT scan 07/18/2021    UTI (urinary tract infection) 07/19/2021    A-fib ([facility] [address] 07/17/2021    Acute pancreatitis 07/17/2021    Altered mental state 08/25/2020    Anxiety 06/22/2020    Gastroesophageal reflux disease without esophagitis 06/22/2020    Mixed hyperlipidemia 06/22/2020    Dysphagia 06/22/2020    Vallecular mass 06/18/2020    OAB (overactive bladder) 06/17/2020    TIA (transient ischemic attack) 06/17/2020    Senile osteoporosis 01/24/2018    Hyperparathyroidism ([address] 03/30/2017    Multinodular goiter 03/30/2017    Parathyroid adenoma     Hypertensive disorder 12/13/2016    Primary osteoarthritis of right knee 10/31/2016    History of cardiovascular disorder 04/21/2015    History of arthritis 04/21/2015    Pure hypercholesterolemia 04/21/2015    Hydronephrosis 11/22/2010    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 High risk for stroke and thromboembolism. Anticoagulation is recommended.   ~ Continue therapeutic Lovenox  ~ Will need [address] at discharge, will consult pharmacy for cost analysis  - Afib risk factors including age, HTN, obesity, inactivity and MILTON were discussed with patient. Risk factor modification recommended   ~ TSH 1.3 (7/17/2021)     - Treatment options including cardioversion, rate control strategy, antiarrhythmics, anticoagulation and possible ablation were discussed with patient. Risks, benefits and alternative of each treatment options were explained. All questions answered    ~ Amiodarone can be considered if rates cannot be adequately controlled, however I have discussed it and she would like to avoid if possible    ~ CV can be done after 3-4 weeks of AC or post surgery if she remains in atrial fibrillation  CAD per CT Scan    - Stress testing has been recommended when pancreatitis resolved   - OK to proceed with surgery as moderate- high cardiac risk  Hypertension   - Controlled.   Continue current medications  Pancreatitis/cholecystitis   - Per primary team and gen surgery   - Plans for surgery tomorrow afternoon    - EP will follow peripherally in the post-operative period, please call with questions  - Check DOAC cost with pharmacy  - Will follow up as O and arrange for CV at that point if she remains in atrial fibrillation. All pertinent information and plan of care discussed with the EP physician. Multiple medical conditions with risk of decompensation. Allergies: Allergies   Allergen Reactions    Benzonatate Hives    Ciprofloxacin      Leg swelling    Clarithromycin Hives    Cortisone     Prednisone      Facial swelling and redness    Sulfa Antibiotics Rash, Itching and Hives       Home Meds:  Prior to Visit Medications    Medication Sig Taking? Authorizing Provider   escitalopram (LEXAPRO) 5 MG tablet Take 5 mg by mouth daily Yes Historical Provider, MD   fluticasone (FLONASE) 50 MCG/ACT nasal spray fluticasone propionate 50 mcg/actuation nasal spray,suspension Yes Historical Provider, MD   aspirin 81 MG EC tablet Take 1 tablet by mouth daily Yes Edyta Neal MD   vitamin B-12 (CYANOCOBALAMIN) 1000 MCG tablet Take 1,000 mcg by mouth daily Yes Historical Provider, MD   bisoprolol (ZEBETA) 10 MG tablet Take 10 mg by mouth daily Yes Historical Provider, MD   amLODIPine (NORVASC) 5 MG tablet Take 10 mg by mouth nightly  Yes Historical Provider, MD   solifenacin (VESICARE) 10 MG tablet Take 10 mg by mouth daily. Yes Historical Provider, MD   Cholecalciferol (VITAMIN D) 1000 UNIT CAPS Take 1,000 Units by mouth daily. Yes Historical Provider, MD   cetirizine (ZYRTEC) 10 MG tablet Take 10 mg by mouth daily.  Yes Historical Provider, MD      Scheduled Meds:   nitrofurantoin (macrocrystal-monohydrate)  100 mg Oral 2 times per day    dilTIAZem  30 mg Oral 4 times per day    aspirin  81 mg Oral Daily    cetirizine  10 mg Oral Daily    Vitamin D  1,000 Units Oral Daily    escitalopram  5 mg Oral Daily    vitamin B-12  1,000 mcg Oral Daily    sodium chloride flush  5-40 mL Intravenous 2 times per day    enoxaparin  1 mg/kg Subcutaneous BID    dilTIAZem  10 mg Intravenous Once    metoprolol succinate  100 mg Oral Daily    trospium  20 mg Oral Daily     Continuous Infusions:   sodium chloride      dilTIAZem Stopped (07/19/21 1107)     PRN Meds:butalbital-acetaminophen-caffeine, fluticasone, perflutren lipid microspheres, sodium chloride flush, sodium chloride, ondansetron **OR** ondansetron, magnesium hydroxide, acetaminophen **OR** acetaminophen, morphine, hydrALAZINE, sodium chloride, potassium chloride **OR** potassium alternative oral replacement **OR** potassium chloride   Past Medical History:  Past Medical History:   Diagnosis Date    Allergies     Elevated cholesterol     Hypertension     Reflux     TIA (transient ischemic attack)         Past Surgical History:    has a past surgical history that includes Kidney stone surgery; Colonoscopy; Skin cancer excision; joint replacement (Right); Cystoscopy; Hemorrhoid surgery; and Excision of Parathyroid Mass (05/11/2017). Social History:  Reviewed. reports that she has never smoked. She has never used smokeless tobacco. She reports that she does not drink alcohol and does not use drugs. Family History:  Reviewed. family history includes Cancer in her mother; Migraines in her brother, mother, and sister. Denies family history of sudden cardiac death, arrhythmia, premature CAD    Review of Systems:  · Constitutional: Negative for fever, weight changes, or weakness  · Skin: Negative for bruising, bleeding, blood clots, or changes in skin pigment  · HEENT: Negative for vision changes or dysphagia  · Respiratory: Reviewed in HPI  · Cardiovascular: Reviewed in HPI  · Gastrointestinal: Negative for abdominal pain, N/V/D, constipation, or black/tarry stools  · Genito-Urinary: Negative for hematuria  · Musculoskeletal: No focal weakness  · Neurological/Psych: Negative for confusion or TIA-like symptoms.  No anxiety, depression, or insomnia    Physical Examination:  Vitals:    07/20/21 1145   BP: 125/78   Pulse: 90   Resp: 16   Temp: 97.2 °F (36.2 °C)   SpO2: 95%      In: -   Out: 700    Wt Readings from Last 3 Encounters: 07/19/21 133 lb 6.4 oz (60.5 kg)   11/05/20 125 lb (56.7 kg)   08/27/20 124 lb 14.4 oz (56.7 kg)       Intake/Output Summary (Last 24 hours) at 7/20/2021 1342  Last data filed at 7/20/2021 3579  Gross per 24 hour   Intake --   Output 700 ml   Net -700 ml     Telemetry: Personally Reviewed Atrial fibrillation   · Constitutional: Cooperative and in no apparent distress, and appears well nourished  · Skin: Warm and pink; no cyanosis, bruising, or clubbing  · HEENT: Symmetric and normocephalic. Conjunctiva pink with clear sclera. Mucus membranes pink and moist.   · Cardiovascular: irregular rate and rhythm. S1 & S2, negative for murmurs. Peripheral pulses 2+, capillary refill < 3 seconds. negative elevation of JVP. · Respiratory: Respirations symmetric and unlabored. Lungs clear to auscultation bilaterally, no wheezing, crackles, or rhonchi  · Gastrointestinal: Abdomen soft and round. Bowel sounds normoactive in all quadrants. · Musculoskeletal: No focal weakness. · Neurologic/Psych: Awake and orientated to person, place and time. Calm affect, appropriate mood    Pertinent labs, diagnostic, device, and imaging results reviewed as a part of this visit    Labs:    BMP:   Recent Labs     07/18/21  0503 07/19/21  0455 07/20/21  0459   * 135* 137   K 3.8 3.7 3.2*    103 104   CO2 26 20* 21   BUN 14 17 16   CREATININE 0.7 0.8 0.6     Estimated Creatinine Clearance: 56 mL/min (based on SCr of 0.6 mg/dL).    CBC:   Recent Labs     07/18/21  0503 07/19/21  0455 07/20/21  0459   WBC 12.5* 13.0* 11.0   HGB 12.3 11.9* 12.9   HCT 36.9 35.2* 37.4   MCV 90.7 90.5 89.4    211 190     Thyroid:   Lab Results   Component Value Date    TSH 1.02 02/15/2017     Lipids:   Lab Results   Component Value Date    CHOL 186 07/17/2021    HDL 61 07/17/2021    TRIG 88 07/17/2021     LFTS:   Lab Results   Component Value Date    ALT 6 07/19/2021    AST 12 07/19/2021    ALKPHOS 76 07/19/2021    PROT 5.8 07/19/2021    AGRATIO 1.3 2021    BILITOT 1.0 2021     Cardiac Enzymes:   Lab Results   Component Value Date    TROPONINI <0.01 2021    TROPONINI <0.01 2021    TROPONINI <0.01 2020     Coags:   Lab Results   Component Value Date    PROTIME 13.1 2021    INR 1.15 2021     EC2021: Atrial Fibrillation    ECHO:  2021  Summary   -Abnormal arrhythmia and rapid heart rate noted. -Somewhat difficult study due to lung interference.   -Left ventricular cavity size is normal.   -Overall left ventricular systolic function appears hyperdynamic.   -Ejection fraction is visually estimated to be 70%.   -No definitive wall motion abnormalities are seen, although difficult to   determine due to abnormal arrhythmia.   -Indeterminate diastolic function.   -Left appears dilated. -Right atrium is dilated. -The aortic valve appears sclerotic but opens well. -Mild tricuspid regurgitation. RVSP = 34 mmHG. -IVC size is normal (<2.1 cm) but collapses < 50% with respiration   consistent with elevated RA pressure (8 mmHg). -Appears to be a trivial pericardial effusion. Stress Test: none    Cath: none    All questions and concerns were addressed to the patient. Alternatives to my treatment were discussed. I have discussed the above stated plan with patient and the nurse. The patient verbalized understanding and agreed with the plan. Thank you for allowing to us to participate in the care of Adelina Wolfe.     SRINIVASA Rowan-CNP  [facility]   Office: [phone]

## 2021-07-20 NOTE — PROGRESS NOTES
Physician Progress Note      KOFFIFrscott Beth  CSN #:                  [id number]  :                       1935  ADMIT DATE:       2021 9:51 AM  DISCH DATE:  RESPONDING  PROVIDER #:        Arash Newsome MD          QUERY TEXT:    Pt admitted with Afib. Pt noted to have UTI. If possible, please document in   the progress notes and discharge summary if you are evaluating and /or   treating any of the following: The medical record reflects the following:  Risk Factors: UTI  Clinical Indicators: WBC 12.5, RR 22-29, -134;   IM note documented   \"  UTI (urinary tract infection) -Established problem. Stable. Staph epi\"  Treatment: PO antibiotic- nitrofurantoin  Options provided:  -- Sepsis, present on admission  -- Sepsis, present on admission, now resolved  -- Sepsis, not present on admission  -- UTI without Sepsis  -- Sepsis was ruled out  -- Other - I will add my own diagnosis  -- Disagree - Not applicable / Not valid  -- Disagree - Clinically unable to determine / Unknown  -- Refer to Clinical Documentation Reviewer    PROVIDER RESPONSE TEXT:    This patient has sepsis which was present on admission.     Query created by: Tracy Bro on 2021 1:50 PM      Electronically signed by:  Arash Newsome MD 2021 8:50 AM

## 2021-07-20 NOTE — ANESTHESIA PRE PROCEDURE
Department of Anesthesiology  Preprocedure Note       Name:  Gavin Calderon   Age:  80 y.o.  :  1935                                          MRN:  [id number]         Date:  2021      Surgeon: Josef Anna):  Gregorio Dash MD    Procedure: Procedure(s):  LAPAROSCOPIC CHOLECYSTECTOMY WITH INTRAOPERATIVE CHOLANGIOGRAM    Medications prior to admission:   Prior to Admission medications    Medication Sig Start Date End Date Taking? Authorizing Provider   escitalopram (LEXAPRO) 5 MG tablet Take 5 mg by mouth daily 20  Yes Historical Provider, MD   fluticasone (FLONASE) 50 MCG/ACT nasal spray fluticasone propionate 50 mcg/actuation nasal spray,suspension   Yes Historical Provider, MD   aspirin 81 MG EC tablet Take 1 tablet by mouth daily 20  Yes Maricruz Corey MD   vitamin B-12 (CYANOCOBALAMIN) 1000 MCG tablet Take 1,000 mcg by mouth daily   Yes Historical Provider, MD   bisoprolol (ZEBETA) 10 MG tablet Take 10 mg by mouth daily   Yes Historical Provider, MD   amLODIPine (NORVASC) 5 MG tablet Take 10 mg by mouth nightly  17  Yes Historical Provider, MD   solifenacin (VESICARE) 10 MG tablet Take 10 mg by mouth daily. Yes Historical Provider, MD   Cholecalciferol (VITAMIN D) 1000 UNIT CAPS Take 1,000 Units by mouth daily. Yes Historical Provider, MD   cetirizine (ZYRTEC) 10 MG tablet Take 10 mg by mouth daily.    Yes Historical Provider, MD       Current medications:    Current Facility-Administered Medications   Medication Dose Route Frequency Provider Last Rate Last Admin    nitrofurantoin (macrocrystal-monohydrate) (MACROBID) capsule 100 mg  100 mg Oral 2 times per day Maricruz Corey MD   100 mg at 21 0850    dilTIAZem (CARDIZEM) tablet 30 mg  30 mg Oral 4 times per day SRINIVASA Hunt - CNP   30 mg at 21 1200    aspirin EC tablet 81 mg  81 mg Oral Daily Maricruz Corey MD   81 mg at 21 0831    butalbital-acetaminophen-caffeine (FIORICET, ESGIC) per tablet 1 Kris Glass MD   1 mg at 07/17/21 1758    metoprolol succinate (TOPROL XL) extended release tablet 100 mg  100 mg Oral Daily Jules Banerjee MD   100 mg at 07/20/21 0847    trospium (SANCTURA) tablet 20 mg  20 mg Oral Daily Izzy Madrid MD   20 mg at 07/20/21 0831    hydrALAZINE (APRESOLINE) injection 10 mg  10 mg Intravenous Q6H PRN Izzy Madrid MD        0.9 % sodium chloride bolus  500 mL Intravenous PRN Izzy Madrid MD        potassium chloride Giles Em M) extended release tablet 40 mEq  40 mEq Oral PRN Izzy Madrid MD   40 mEq at 07/20/21 2049    Or    potassium bicarb-citric acid (EFFER-K) effervescent tablet 40 mEq  40 mEq Oral PRN Izzy Madrid MD        Or    potassium chloride 10 mEq/100 mL IVPB (Peripheral Line)  10 mEq Intravenous PRN Izzy Madrid MD           Allergies:     Allergies   Allergen Reactions    Benzonatate Hives    Ciprofloxacin      Leg swelling    Clarithromycin Hives    Cortisone     Prednisone      Facial swelling and redness    Sulfa Antibiotics Rash, Itching and Hives       Problem List:    Patient Active Problem List   Diagnosis Code    Parathyroid adenoma D35.1    Hyperparathyroidism ([address] E21.3    Multinodular goiter E04.2    Hypertensive disorder I10    OAB (overactive bladder) N32.81    TIA (transient ischemic attack) G45.9    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    History of cardiovascular disorder Z86.79    Anxiety F41.9    Gastroesophageal reflux disease without esophagitis K21.9    History of arthritis Z87.39    Hydronephrosis N13.30    Mixed hyperlipidemia E78.2    Primary osteoarthritis of right knee M17.11    Pure hypercholesterolemia E78.00    Senile osteoporosis M81.0    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    Urge incontinence N39.41    Dysphagia R13.10    Altered mental state R41.82    A-fib (HCC) I48.91    Acute pancreatitis K85.90    Coronary artery calcification seen on CT scan I25.10    UTI (urinary tract infection) N39.0       Past Medical History:        Diagnosis Date    Allergies     Elevated cholesterol     Hypertension     Reflux     TIA (transient ischemic attack)        Past Surgical History:        Procedure Laterality Date    COLONOSCOPY      BX OR SECAL POLYP    CYSTOSCOPY      EXCISION OF PARATHYROID MASS  05/11/2017    EXCISION OF LEFT PARATHYROID ADENOMA WITH FROZEN SECTION    HEMORRHOID SURGERY      JOINT REPLACEMENT Right     KNEE    KIDNEY STONE SURGERY      SKIN CANCER EXCISION         Social History:    Social History     Tobacco Use    Smoking status: Never Smoker    Smokeless tobacco: Never Used   Substance Use Topics    Alcohol use: No                                Counseling given: Not Answered      Vital Signs (Current):   Vitals:    07/20/21 0429 07/20/21 0845 07/20/21 1145 07/20/21 1438   BP: 115/71 105/70 125/78 129/74   Pulse: 82 105 90 68   Resp: 16 18 16 20   Temp: 98 °F (36.7 °C) 97.7 °F (36.5 °C) 97.2 °F (36.2 °C) 97.3 °F (36.3 °C)   TempSrc: Oral Oral Oral Temporal   SpO2: 94% 97% 95% 95%   Weight:       Height:                                                  BP Readings from Last 3 Encounters:   07/20/21 129/74   11/05/20 (!) 152/75   08/27/20 (!) 156/69       NPO Status: Time of last liquid consumption: 0000                        Time of last solid consumption: 0000                        Date of last liquid consumption: 07/20/21                        Date of last solid food consumption: 07/20/21    BMI:   Wt Readings from Last 3 Encounters:   07/19/21 133 lb 6.4 oz (60.5 kg)   11/05/20 125 lb (56.7 kg)   08/27/20 124 lb 14.4 oz (56.7 kg)     Body mass index is 23.63 kg/m².     CBC:   Lab Results   Component Value Date    WBC 11.0 07/20/2021    RBC 4.18 07/20/2021    HGB 12.9 07/20/2021    HCT 37.4 07/20/2021    MCV 89.4 07/20/2021    RDW 13.3 07/20/2021     07/20/2021       CMP:   Lab Results   Component Value Date     07/20/2021    K 3.2 07/20/2021    K 3.8 07/18/2021     Álvaro Delacruz MD   7/20/2021

## 2021-07-20 NOTE — PROGRESS NOTES
Patient seen by me in consultation and followed by cardiology service. Overall cardiac status has been stable. While the patient does have evidence of coronary artery disease as evidenced by coronary calcification on CT, she does not have any clinical symptoms of angina or decompensated cardiac status. She has normal systolic function on 2D echo with Doppler this admission as well as no significant valvular abnormalities. She can proceed at moderate to high risk from cardiology standpoint given that there has not been further ischemic evaluation however doing so would be unlikely to yield her as a high risk patient. May proceed with planned noncardiac surgery.

## 2021-07-20 NOTE — PROGRESS NOTES
Pharmacy to check patient copays for Eliquis/Xarelto:    Copay for patient will be: $47/month    Pharmacy will continue to follow the decision for anticoagulation and  the patient if appropriate.      Huseyin Chang, PharmD  Clinical Pharmacist [id number]  7/20/2021

## 2021-07-20 NOTE — PLAN OF CARE
new skin breakdown  Outcome: Ongoing     Problem: Falls - Risk of:  Goal: Will remain free from falls  Description: Will remain free from falls  Outcome: Ongoing  Goal: Absence of physical injury  Description: Absence of physical injury  Outcome: Ongoing   Pt has denied any pain at this time. Pt is at baseline activity functioning at this time and states \"she feels better\". VSS on room air at this time. HR is rate control, rhythm is still atrial fibrillation. Pt has not expressed or appeared anxious at this time. Pt shows no new signs of infection or bleeding and remains free from falls and physical injury. Pt shows no new signs of skin breakdown.

## 2021-07-20 NOTE — BRIEF OP NOTE
Brief Postoperative Note      Patient: Cecilia Echols  YOB: 1935  MRN: [id number]    Date of Procedure: 7/20/2021    Pre-Op Diagnosis: Gallstone Pancreatitis    Post-Op Diagnosis: Same       Procedure(s):  LAPAROSCOPIC CHOLECYSTECTOMY WITH INTRAOPERATIVE CHOLANGIOGRAM    Surgeon(s):  Danica Springer MD    Assistant:  First Assistant: Judene Bosworth, RN    Anesthesia: General    Estimated Blood Loss (mL): Minimal    Complications: None    Specimens:   ID Type Source Tests Collected by Time Destination   A : A) GALL BLADDER Tissue Gallbladder SURGICAL PATHOLOGY Danica Springer MD 7/20/2021 1624        Implants:  * No implants in log *      Drains:   Closed/Suction Drain Right RUQ Bulb 7 [place] (Active)       External Urinary Catheter (Active)   Catheter changed  No 07/19/21 0514   Urine Color Yellow 07/20/21 0011   Urine Appearance Clear 07/20/21 0011   Output (mL) 700 mL 07/20/21 0653   Suction 40 mmgHg continuous 07/20/21 0011   Placement Initiated 07/17/21 1802   Skin Assessment Other 07/19/21 0514       Findings: normal cholangiogram    Electronically signed by Danica Springer MD on 7/20/2021 at 5:08 PM

## 2021-07-20 NOTE — PROGRESS NOTES
Pt arrived to PACU from OR. Pt arouses to voice. VSS. Pt noted to be in A-fib, HR 115s. Laparoscopic incision sites x4 are CDI, BETTY draining small amount of serous drainage.

## 2021-07-20 NOTE — PROGRESS NOTES
Gastroenterology Progress Note    Otoniel Yates is a 80 y.o. female patient. Principal Problem:    A-fib ([address]  Active Problems:    Coronary artery calcification seen on CT scan    Hyperparathyroidism (HCC)    Gastroesophageal reflux disease without esophagitis    Pure hypercholesterolemia    Acute pancreatitis    UTI (urinary tract infection)  Resolved Problems:    * No resolved hospital problems. *      SUBJECTIVE:  No abdominal pain, N/V.   ROS:  No fever, chills  No chest pain, palpitations  No SOB, cough  Gastrointestinal ROS: see above    Physical    VITALS:  /71   Pulse 82   Temp 98 °F (36.7 °C) (Oral)   Resp 16   Ht 5' 3\" (1.6 m)   Wt 133 lb 6.4 oz (60.5 kg)   SpO2 94%   BMI 23.63 kg/m²   TEMPERATURE:  Current - Temp: 98 °F (36.7 °C); Max - Temp  Av.4 °F (36.9 °C)  Min: 98 °F (36.7 °C)  Max: 99 °F (37.2 °C)    NAD  Regular rate   Abdomen soft, ND, NT,  Bowel sounds normal.  Anicteric, no jaundice    Data    Data Review:    Recent Labs     21  0503 21  0455 21  0459   WBC 12.5* 13.0* 11.0   HGB 12.3 11.9* 12.9   HCT 36.9 35.2* 37.4   MCV 90.7 90.5 89.4    211 190     Recent Labs     21  0503 21  0455 21  0459   * 135* 137   K 3.8 3.7 3.2*    103 104   CO2 26 20* 21   BUN 14 17 16   CREATININE 0.7 0.8 0.6     Recent Labs     21  1026 21  0503 21  0455   AST 17 13* 12*   ALT 8* 6* 6*   BILIDIR  --   --  <0.2   BILITOT 0.7 0.9 1.0   ALKPHOS 82 69 76     Recent Labs     21  0503 21  0455 21  0459   LIPASE 442.0* 22.0 17.0   AMYLASE 362* 67 28     Recent Labs     21  1026 07/18/21  0502   PROTIME 10.6 13.1*   INR 0.94 1.15*     No results for input(s): PTT in the last 72 hours. CT abd/pelvis w iv contrast 21  Impression   1. Acute pancreatitis.  Diffuse peripancreatic inflammatory changes with no   focal peripancreatic fluid collection.    2. Colonic diverticulosis with no acute features.  Mild gastric wall   thickening with mucosal enhancement, possibly a mild gastritis. 3. Nonobstructive bilateral nephrolithiasis.  Bilateral renal cysts. 4. Mild hepatic steatosis. RUQ US 7/19/21  Impression   Hypoechoic, heterogeneous and edematous appearing pancreas consistent with   acute pancreatitis.       Gallbladder wall thickening, likely secondary to pancreatitis.       Prominent/dilated common bile duct measuring up to 0.9 cm, however this may   be age related. Nadeem Arce correlation is recommended.       Gallbladder polyp and sludge noted. ASSESSMENT :    Acute pancreatitis -per CT and lipase over 3000. CT also with calcifications of the pancreas consistent with chronic pancreatitis. No prior tobacco or alcohol use. History of pancreatitis from hypercalcemia a year ago now status post parathyroidectomy. Ca and TG normal. RUQ US with gallbladder sludge so may be biliary pancreatitis. Pain resolved. CT with gastric wall thickening. ?If this could be secondary to the pancreatitis. PLAN :  -appreciate surgery eval, planning kathia  - f/u with Dr Kimberlee Lozano in the office. Will plan f/u EGD for mild gastric wall thickening at [facility]. Discussed with Dr. Lolly Burris, [facility]  [facility]      I have personally performed a face to face diagnostic evaluation on this patient. I have interviewed and examined the patient and I agree with the findings and recommended plan of care. In summary, my findings and plan are the following:  Acute pancreatitis with gallbladder sludge on US. Now without pain. Ok for lap kathia with IOC. Then ok for dc from gi perspective. Pt will need outpt EGD for thickened stomach noted on ct.        Joel Nielsen MD  [address]

## 2021-07-20 NOTE — PROGRESS NOTES
EC tablet 81 mg, 81 mg, Oral, Daily  butalbital-acetaminophen-caffeine (FIORICET, ESGIC) per tablet 1 tablet, 1 tablet, Oral, Q12H PRN  cetirizine (ZYRTEC) tablet 10 mg, 10 mg, Oral, Daily  Vitamin D (CHOLECALCIFEROL) tablet 1,000 Units, 1,000 Units, Oral, Daily  escitalopram (LEXAPRO) tablet 5 mg, 5 mg, Oral, Daily  fluticasone (FLONASE) 50 MCG/ACT nasal spray 1 spray, 1 spray, Each Nostril, Daily PRN  vitamin B-12 (CYANOCOBALAMIN) tablet 1,000 mcg, 1,000 mcg, Oral, Daily  perflutren lipid microspheres (DEFINITY) injection 1.65 mg, 1.5 mL, Intravenous, ONCE PRN  sodium chloride flush 0.9 % injection 5-40 mL, 5-40 mL, Intravenous, 2 times per day  sodium chloride flush 0.9 % injection 10 mL, 10 mL, Intravenous, PRN  0.9 % sodium chloride infusion, 25 mL, Intravenous, PRN  ondansetron (ZOFRAN-ODT) disintegrating tablet 4 mg, 4 mg, Oral, Q8H PRN **OR** ondansetron (ZOFRAN) injection 4 mg, 4 mg, Intravenous, Q6H PRN  magnesium hydroxide (MILK OF MAGNESIA) 400 MG/5ML suspension 30 mL, 30 mL, Oral, Daily PRN  acetaminophen (TYLENOL) tablet 650 mg, 650 mg, Oral, Q6H PRN **OR** acetaminophen (TYLENOL) suppository 650 mg, 650 mg, Rectal, Q6H PRN  enoxaparin (LOVENOX) injection 50 mg, 1 mg/kg, Subcutaneous, BID  dilTIAZem injection 10 mg, 10 mg, Intravenous, Once **FOLLOWED BY** dilTIAZem 125 mg in dextrose 5 % 125 mL infusion, 5 mg/hr, Intravenous, Continuous  morphine (PF) injection 1 mg, 1 mg, Intravenous, Q4H PRN  metoprolol succinate (TOPROL XL) extended release tablet 100 mg, 100 mg, Oral, Daily  trospium (SANCTURA) tablet 20 mg, 20 mg, Oral, Daily  hydrALAZINE (APRESOLINE) injection 10 mg, 10 mg, Intravenous, Q6H PRN  0.9 % sodium chloride bolus, 500 mL, Intravenous, PRN  potassium chloride (KLOR-CON M) extended release tablet 40 mEq, 40 mEq, Oral, PRN **OR** potassium bicarb-citric acid (EFFER-K) effervescent tablet 40 mEq, 40 mEq, Oral, PRN **OR** potassium chloride 10 mEq/100 mL IVPB (Peripheral Line), 10 mEq, Intravenous, PRN         Objective:  /71   Pulse 82   Temp 98 °F (36.7 °C) (Oral)   Resp 16   Ht 5' 3\" (1.6 m)   Wt 133 lb 6.4 oz (60.5 kg)   SpO2 94%   BMI 23.63 kg/m²      Patient Vitals for the past 24 hrs:   BP Temp Temp src Pulse Resp SpO2   07/20/21 0429 115/71 98 °F (36.7 °C) Oral 82 16 94 %   07/19/21 2356 107/66 98.1 °F (36.7 °C) Oral 92 16 95 %   07/19/21 2108 -- -- -- 96 -- --   07/19/21 2017 119/72 98 °F (36.7 °C) Oral 89 18 94 %   07/19/21 1744 (!) 127/100 98.6 °F (37 °C) Oral 94 16 94 %   07/19/21 1553 112/69 98.4 °F (36.9 °C) Oral 96 16 92 %   07/19/21 1320 107/71 99 °F (37.2 °C) Oral 93 16 92 %   07/19/21 0941 115/71 98.7 °F (37.1 °C) Oral 104 15 92 %     Patient Vitals for the past 96 hrs (Last 3 readings):   Weight   07/19/21 0716 133 lb 6.4 oz (60.5 kg)   07/17/21 0958 120 lb (54.4 kg)           Intake/Output Summary (Last 24 hours) at 7/20/2021 0828  Last data filed at 7/20/2021 0653  Gross per 24 hour   Intake 240 ml   Output 700 ml   Net -460 ml         Physical Exam:   /71   Pulse 82   Temp 98 °F (36.7 °C) (Oral)   Resp 16   Ht 5' 3\" (1.6 m)   Wt 133 lb 6.4 oz (60.5 kg)   SpO2 94%   BMI 23.63 kg/m²   General appearance: alert, appears stated age and cooperative  Head: Normocephalic, without obvious abnormality, atraumatic  Lungs: clear to auscultation bilaterally  Heart: irreg irreg  Abdomen: mild RUQ tend, bs+  Extremities: extremities normal, atraumatic, no cyanosis or edema    Labs:  Lab Results   Component Value Date    WBC 11.0 07/20/2021    HGB 12.9 07/20/2021    HCT 37.4 07/20/2021     07/20/2021    CHOL 186 07/17/2021    TRIG 88 07/17/2021    HDL 61 (H) 07/17/2021    ALT 6 (L) 07/19/2021    AST 12 (L) 07/19/2021     07/20/2021    K 3.2 (L) 07/20/2021     07/20/2021    CREATININE 0.6 07/20/2021    BUN 16 07/20/2021    CO2 21 07/20/2021    TSH 1.02 02/15/2017    INR 1.15 (H) 07/18/2021    LABA1C 5.5 08/26/2020    LABMICR YES 07/17/2021     Lab ------------------------------------------------------------------  Electronically signed by Silvano Gautam MD, [facility] [place] (Interpreting  physician) on 07/19/2021 at 10:12 AM  ------------------------------------------------------------------   Findings   Left Ventricle  Left ventricular cavity size is normal.  Overall left ventricular systolic function appears hyperdynamic. Ejection fraction is visually estimated to be70%. No definitive wall motion abnormalities are seen, although difficult to  determine due to abnormal arrhythmia. Indeterminate diastolic function. Mitral Valve  Mitral valve is structurally normal.  No evidence of mitral regurgitation. Left Atrium  Left appears is dilated. Aortic Valve  The aortic valve appears sclerotic but opens well. No evidence of aortic valve regurgitation. Aorta  The aortic root is normal in size. Right Ventricle  The right ventricle is normal in size and function. TASPE = 1.9 cm. RVS velocity is 12.6 cm/s. RVSP = 34 mmHG. Tricuspid Valve  Tricuspid valve is structurally normal.  Mild tricuspid regurgitation. Right Atrium  Right atrium is dilated. Pulmonic Valve  The pulmonic valve is not well visualized. No evidence of pulmonic valve regurgitation. Pericardial Effusion  Appears to be a trivial pericardial effusion. Pleural Effusion  No pleural effusion. Miscellaneous  IVC size is normal (<2.1 cm) but collapses < 50% with respiration consistent  with elevated RA pressure (8 mmHg).   M-Mode/2D Measurements (cm)   LV Diastolic Dimension: 2.72 cm LV Systolic Dimension: 4.27 cm  LV Septum Diastolic: 2.35 cm  LV PW Diastolic: 8.97 cm        AO Root Dimension: 2.9 cm                                  LA Dimension: 3.7 cm                                  LA Area: 14.4 cm2  LVOT: 2 cm                      LA volume/Index: 33.2 ml /21 ml/m2  Doppler Measurements   AV Peak Velocity: 126 cm/s  AV Peak Gradient: 6.35 mmHg  AV Mean Gradient: 3 mmHg  LVOT Peak Velocity: 94 cm/s  AV Area (Continuity):2.05 cm2   TR Velocity:253 cm/s  TR Gradient:25.6 mmHg  Estimated RAP:8 mmHg  Estimated RVSP: 34 mmHg  E' Septal Velocity: 5.77 cm/s  E' Lateral Velocity: 9.79 cm/s   Aortic Valve   Peak Velocity: 126 cm/s     Mean Velocity: 83 cm/s  Peak Gradient: 6.35 mmHg    Mean Gradient: 3 mmHg  Area (continuity): 2.05 cm2  AV VTI: 25.1 cm  Aorta   Aortic Root: 2.9 cm  LVOT Diameter: 2 cm      CT ABDOMEN PELVIS W IV CONTRAST Additional Contrast? None    Result Date: 7/18/2021  EXAMINATION: CT OF THE ABDOMEN AND PELVIS WITH CONTRAST 7/17/2021 11:05 am TECHNIQUE: CT of the abdomen and pelvis was performed with the administration of intravenous contrast. Multiplanar reformatted images are provided for review. Dose modulation, iterative reconstruction, and/or weight based adjustment of the mA/kV was utilized to reduce the radiation dose to as low as reasonably achievable. COMPARISON: 08/25/2020 HISTORY: ORDERING SYSTEM PROVIDED HISTORY: abd pain TECHNOLOGIST PROVIDED HISTORY: Reason for exam:->abd pain Additional Contrast?->None Decision Support Exception - unselect if not a suspected or confirmed emergency medical condition->Emergency Medical Condition (MA) Reason for Exam: Abdominal Pain (abd 'soreness' started yesterday after lunch. ) Acuity: Acute Type of Exam: Initial FINDINGS: Lower Chest: No acute infiltrate at the lung bases. Plate-like atelectasis bilaterally. Cardiomegaly with scattered coronary vascular calcification. Organs: Mild hepatic steatosis with no focal abnormality. The spleen and adrenal glands are unremarkable. No acute biliary findings. No solid renal mass or significant hydronephrosis. Bilateral renal cysts, the largest on the left measuring 3.5 cm. Nonobstructive bilateral nephrolithiasis. Diffuse peripancreatic inflammatory changes consistent with acute pancreatitis. No focal peripancreatic fluid collection or evidence of necrosis.   Scattered pancreatic calcifications, the largest in the body measuring 10 mm consistent with chronic pancreatitis. GI/Bowel: No pericolonic inflammatory changes. Scattered diverticulosis with no acute features. Mild retained stool particularly in the ascending colon. No small bowel distension. Mild gastric wall thickening with distension of the proximal stomach. Mild mucosal enhancement. The duodenal sweep is unremarkable. Pelvis: No pelvic mass or free pelvic fluid. Calcified degenerated uterine fibroids. Mild distention of the urinary bladder. Peritoneum/Retroperitoneum: The abdominal aorta is normal in caliber with diffuse atherosclerotic plaque. No retroperitoneal adenopathy. No upper abdominal ascites. Bones/Soft Tissues: No acute osseous or soft tissue abnormality. The bones are diffusely osteopenic. Mild degenerative changes throughout the thoracic and lumbar spine. 1. Acute pancreatitis. Diffuse peripancreatic inflammatory changes with no focal peripancreatic fluid collection. 2. Colonic diverticulosis with no acute features. Mild gastric wall thickening with mucosal enhancement, possibly a mild gastritis. 3. Nonobstructive bilateral nephrolithiasis. Bilateral renal cysts. 4. Mild hepatic steatosis. US GALLBLADDER RUQ    Result Date: 7/19/2021  EXAMINATION: RIGHT UPPER QUADRANT ULTRASOUND 7/19/2021 10:32 am COMPARISON: None. HISTORY: ORDERING SYSTEM PROVIDED HISTORY: pancreatitis TECHNOLOGIST PROVIDED HISTORY: Reason for exam:->pancreatitis Reason for Exam: pancreatitis Acuity: Acute Type of Exam: Initial Additional signs and symptoms: patient states upper abd pain/bloating/heartburn Relevant Medical/Surgical History: na FINDINGS: LIVER:  The liver demonstrates normal echogenicity without evidence of intrahepatic biliary ductal dilatation. BILIARY SYSTEM: Gallbladder polyp noted measuring 0.4 cm. Sludge is also noted within the gallbladder.   There is gallbladder wall thickening measuring up to 0.7 cm likely secondary to pancreatitis. No gallbladder stones or pericholecystic fluid noted. Negative sonographic Rossi's sign. Common bile duct measures 0.9 cm. RIGHT KIDNEY: Multiple cysts are noted within the right kidney with the largest measuring approximately 1.0 x 0.6 x 0.8 cm and simple in appearance. The right kidney is otherwise grossly unremarkable without evidence of hydronephrosis. PANCREAS:  The pancreas appears edematous, heterogeneous and hypoechoic consistent with pancreatitis. The pancreatic duct is prominent measuring up to 0.3 cm. OTHER: No evidence of right upper quadrant ascites. Hypoechoic, heterogeneous and edematous appearing pancreas consistent with acute pancreatitis. Gallbladder wall thickening, likely secondary to pancreatitis. Prominent/dilated common bile duct measuring up to 0.9 cm, however this may be age related. Clinical correlation is recommended. Gallbladder polyp and sludge noted. XR CHEST PORTABLE    Result Date: 7/17/2021  EXAMINATION: ONE XRAY VIEW OF THE CHEST 7/17/2021 10:27 am COMPARISON: 08/25/2020 HISTORY: ORDERING SYSTEM PROVIDED HISTORY: new onset afib TECHNOLOGIST PROVIDED HISTORY: Reason for exam:->new onset afib Reason for Exam: Abdominal Pain (abd 'soreness' started yesterday after lunch. ) Acuity: Acute Type of Exam: Initial FINDINGS: The cardiac silhouette is prominent. Mediastinal and hilar contours are stable. No vascular dilation is appreciated. No consolidation is evident. Stable cardiomegaly. No acute cardiopulmonary disease. Assessment and Plan:  Principal Problem:    A-fib ([address] -Established problem. Stable. Still in fib  Plan: will need anti-coag post op. EP on board  Active Problems:    Coronary artery calcification seen on CT scan    Hyperparathyroidism ([address] -Established problem. Stable. Plan: Continue present orders/plan. Gastroesophageal reflux disease without esophagitis -Established problem. Stable.     Plan: cont ppi Pure hypercholesterolemia -Established problem. Stable. Plan: Continue present orders/plan. Acute pancreatitis -Established problem. Stable. 2/2 gallbladder sludge?   Plan: scheduled for OR today    UTI (urinary tract infection)  Plan: cont abx            Zakia Moore MD  7/20/2021

## 2021-07-20 NOTE — PROGRESS NOTES
[facility]        Progress Note    Patient Name: Rema Carrizales  MRN: [id number]  YOB: 1935  Date of Evaluation: 2021    Chief Complaint: Abdominal pain    Subjective:  No acute events overnight  No further abdominal pain  No nausea or vomiting, tolerated low fat diet yesterday  Denies chest pain or SOB  Passing flatus, no stool since admission  Up to chair at this time      Vital Signs:  Patient Vitals for the past 24 hrs:   BP Temp Temp src Pulse Resp SpO2   21 0845 105/70 97.7 °F (36.5 °C) Oral 105 18 97 %   21 0429 115/71 98 °F (36.7 °C) Oral 82 16 94 %   21 2356 107/66 98.1 °F (36.7 °C) Oral 92 16 95 %   21 2108 -- -- -- 96 -- --   21 119/72 98 °F (36.7 °C) Oral 89 18 94 %   21 1744 (!) 127/100 98.6 °F (37 °C) Oral 94 16 94 %   21 1553 112/69 98.4 °F (36.9 °C) Oral 96 16 92 %   21 1320 107/71 99 °F (37.2 °C) Oral 93 16 92 %      TEMPERATURE HISTORY 24H: Temp (24hrs), Av.3 °F (36.8 °C), Min:97.7 °F (36.5 °C), Max:99 °F (37.2 °C)    BLOOD PRESSURE HISTORY: Systolic (53MQS), ABW:483 , Min:98 , WQK:193    Diastolic (15TLU), BYV:42, Min:58, Max:100      Intake/Output:  I/O last 3 completed shifts: In: 240 [P.O.:240]  Out: 700 [Urine:700]  No intake/output data recorded.   Drain/tube Output:       Physical Exam:  General: awake, alert, oriented to  person, place, time  Cardiovascular:  irregular rhythm and no murmur noted  Lungs: clear to auscultation  Abdomen: soft, nontender, nondistended, bowel sounds normal     Labs:  CBC:    Recent Labs     21  0503 21  0455 21  0459   WBC 12.5* 13.0* 11.0   HGB 12.3 11.9* 12.9   HCT 36.9 35.2* 37.4    211 190     BMP:    Recent Labs     21  0503 21  0455 21  0459   * 135* 137   K 3.8 3.7 3.2*    103 104   CO2 26 20* 21   BUN 14 17 16   CREATININE 0.7 0.8 0.6   GLUCOSE 94 92 120*     Hepatic:    Recent Labs 07/18/21  0503 07/19/21  0455   AST 13* 12*   ALT 6* 6*   BILITOT 0.9 1.0   ALKPHOS 69 76     Amylase:    Lab Results   Component Value Date    AMYLASE 28 07/20/2021    AMYLASE 67 07/19/2021    AMYLASE 362 07/18/2021     Lipase:    Lab Results   Component Value Date    LIPASE 17.0 07/20/2021    LIPASE 22.0 07/19/2021    LIPASE 442.0 07/18/2021      Mag:    Lab Results   Component Value Date    MG 1.80 03/15/2017    MG 1.50 03/14/2017     Phos:   No results found for: PHOS   Coags:   Lab Results   Component Value Date    PROTIME 13.1 07/18/2021    INR 1.15 07/18/2021    APTT 30.3 07/17/2021       Cultures:  Anaerobic culture  No results found for: LABANAE  Fungus stain  No results found for requested labs within last 30 days. Gram stain  No results found for requested labs within last 30 days. Organism  Lab Results   Component Value Date/Time    ORG Staphylococcus epidermidis (A) 07/17/2021 10:26 AM     Surgical culture  No results found for: CXSURG  Blood culture 1  No results found for requested labs within last 30 days. Blood culture 2  No results found for requested labs within last 30 days. Fecal occult  No results found for requested labs within last 30 days. GI bacterial pathogens by PCR  No results found for requested labs within last 30 days. C. difficile  No results found for requested labs within last 30 days.      Urine culture  Lab Results   Component Value Date    LABURIN >100,000 CFU/ml 07/17/2021       Pathology:  No relevant pathology     Imaging:  I have personally reviewed the following films:    Echo Complete    Result Date: 7/19/2021  Transthoracic Echocardiography Report (TTE)  Demographics   Patient Name       Carlito Balbuena   Date of Study      07/19/2021         Gender              Female   Patient Number     [id number]         Date of Birth       1935   Visit Number       [id number]          Age                 80 year(s)   Accession Number   [id number]         Room Number         3687   Corporate ID       [id number]           Sonographer         Jennifer Miller   Ordering Physician Damon Avendano,  Interpreting        Devi Celeste MD,                     MD                 Physician           [facility]  Procedure Type of Study   TTE procedure:ECHOCARDIOGRAM COMPLETE 2D W DOPPLER W COLOR. Procedure Date Date: 07/19/2021 Start: 08:37 AM Study Location: [facility] - Echo Lab Technical Quality: Adequate visualization Indications:Atrial fibrillation. Patient Status: Routine Height: 63 inches Weight: 120 pounds BSA: 1.56 m2 BMI: 21.26 kg/m2 BP: 98/63 mmHg  Conclusions   Summary  -Abnormal arrhythmia and rapid heart rate noted. -Somewhat difficult study due to lung interference.  -Left ventricular cavity size is normal.  -Overall left ventricular systolic function appears hyperdynamic.  -Ejection fraction is visually estimated to be 70%.  -No definitive wall motion abnormalities are seen, although difficult to  determine due to abnormal arrhythmia.  -Indeterminate diastolic function.  -Left appears dilated. -Right atrium is dilated. -The aortic valve appears sclerotic but opens well. -Mild tricuspid regurgitation. RVSP = 34 mmHG. -IVC size is normal (<2.1 cm) but collapses < 50% with respiration  consistent with elevated RA pressure (8 mmHg). -Appears to be a trivial pericardial effusion. Signature   ------------------------------------------------------------------  Electronically signed by Devi Celeste MD, [facility] (Interpreting  physician) on 07/19/2021 at 10:12 AM  ------------------------------------------------------------------   Findings   Left Ventricle  Left ventricular cavity size is normal.  Overall left ventricular systolic function appears hyperdynamic. Ejection fraction is visually estimated to be70%.   No definitive wall motion abnormalities are seen, although difficult to  determine due to abnormal arrhythmia. Indeterminate diastolic function. Mitral Valve  Mitral valve is structurally normal.  No evidence of mitral regurgitation. Left Atrium  Left appears is dilated. Aortic Valve  The aortic valve appears sclerotic but opens well. No evidence of aortic valve regurgitation. Aorta  The aortic root is normal in size. Right Ventricle  The right ventricle is normal in size and function. TASPE = 1.9 cm. RVS velocity is 12.6 cm/s. RVSP = 34 mmHG. Tricuspid Valve  Tricuspid valve is structurally normal.  Mild tricuspid regurgitation. Right Atrium  Right atrium is dilated. Pulmonic Valve  The pulmonic valve is not well visualized. No evidence of pulmonic valve regurgitation. Pericardial Effusion  Appears to be a trivial pericardial effusion. Pleural Effusion  No pleural effusion. Miscellaneous  IVC size is normal (<2.1 cm) but collapses < 50% with respiration consistent  with elevated RA pressure (8 mmHg).   M-Mode/2D Measurements (cm)   LV Diastolic Dimension: 5.56 cm LV Systolic Dimension: 9.12 cm  LV Septum Diastolic: 2.44 cm  LV PW Diastolic: 4.85 cm        AO Root Dimension: 2.9 cm                                  LA Dimension: 3.7 cm                                  LA Area: 14.4 cm2  LVOT: 2 cm                      LA volume/Index: 33.2 ml /21 ml/m2  Doppler Measurements   AV Peak Velocity: 126 cm/s  AV Peak Gradient: 6.35 mmHg  AV Mean Gradient: 3 mmHg  LVOT Peak Velocity: 94 cm/s  AV Area (Continuity):2.05 cm2   TR Velocity:253 cm/s  TR Gradient:25.6 mmHg  Estimated RAP:8 mmHg  Estimated RVSP: 34 mmHg  E' Septal Velocity: 5.77 cm/s  E' Lateral Velocity: 9.79 cm/s   Aortic Valve   Peak Velocity: 126 cm/s     Mean Velocity: 83 cm/s  Peak Gradient: 6.35 mmHg    Mean Gradient: 3 mmHg  Area (continuity): 2.05 cm2  AV VTI: 25.1 cm  Aorta   Aortic Root: 2.9 cm  LVOT Diameter: 2 cm      US GALLBLADDER RUQ    Result Date: 7/19/2021  EXAMINATION: RIGHT UPPER QUADRANT ULTRASOUND 7/19/2021 10:32 am COMPARISON: None. HISTORY: ORDERING SYSTEM PROVIDED HISTORY: pancreatitis TECHNOLOGIST PROVIDED HISTORY: Reason for exam:->pancreatitis Reason for Exam: pancreatitis Acuity: Acute Type of Exam: Initial Additional signs and symptoms: patient states upper abd pain/bloating/heartburn Relevant Medical/Surgical History: na FINDINGS: LIVER:  The liver demonstrates normal echogenicity without evidence of intrahepatic biliary ductal dilatation. BILIARY SYSTEM: Gallbladder polyp noted measuring 0.4 cm. Sludge is also noted within the gallbladder. There is gallbladder wall thickening measuring up to 0.7 cm likely secondary to pancreatitis. No gallbladder stones or pericholecystic fluid noted. Negative sonographic Rossi's sign. Common bile duct measures 0.9 cm. RIGHT KIDNEY: Multiple cysts are noted within the right kidney with the largest measuring approximately 1.0 x 0.6 x 0.8 cm and simple in appearance. The right kidney is otherwise grossly unremarkable without evidence of hydronephrosis. PANCREAS:  The pancreas appears edematous, heterogeneous and hypoechoic consistent with pancreatitis. The pancreatic duct is prominent measuring up to 0.3 cm. OTHER: No evidence of right upper quadrant ascites. Hypoechoic, heterogeneous and edematous appearing pancreas consistent with acute pancreatitis. Gallbladder wall thickening, likely secondary to pancreatitis. Prominent/dilated common bile duct measuring up to 0.9 cm, however this may be age related. Clinical correlation is recommended. Gallbladder polyp and sludge noted.      Scheduled Meds:   nitrofurantoin (macrocrystal-monohydrate)  100 mg Oral 2 times per day    dilTIAZem  30 mg Oral 4 times per day    aspirin  81 mg Oral Daily    cetirizine  10 mg Oral Daily    Vitamin D  1,000 Units Oral Daily    escitalopram  5 mg Oral Daily    vitamin B-12  1,000 mcg Oral Daily    sodium chloride flush  5-40 mL Intravenous 2 times per day    enoxaparin 1 mg/kg Subcutaneous BID    dilTIAZem  10 mg Intravenous Once    metoprolol succinate  100 mg Oral Daily    trospium  20 mg Oral Daily     Continuous Infusions:   sodium chloride      dilTIAZem Stopped (07/19/21 8640)     PRN Meds:.butalbital-acetaminophen-caffeine, fluticasone, perflutren lipid microspheres, sodium chloride flush, sodium chloride, ondansetron **OR** ondansetron, magnesium hydroxide, acetaminophen **OR** acetaminophen, morphine, hydrALAZINE, sodium chloride, potassium chloride **OR** potassium alternative oral replacement **OR** potassium chloride      Assessment:  80 y.o. female admitted with   1. Acute pancreatitis, unspecified complication status, unspecified pancreatitis type    2. Atrial fibrillation with RVR (HCC)        Biliary pancreatitis  Atrial fibrillation  Hypertension       Plan:  1. Proceed with laparoscopic cholecystectomy today if cleared by cardiology  2. NPO  3. IV hydration; monitor and correct electrolytes  4. Perioperative antibiotics  5. Activity as tolerated--PT/OT following  6. Pulmonary toilet, incentive spirometry  7. PRN analgesics and antiemetics--minimizing narcotics as tolerated  8. DVT prophylaxis with Lovenox and SCD's  9. Management of medical comorbid etiologies per primary team and consulting services  10. Disposition: Anticipate home tomorrow from a surgical perspective if pain controlled and tolerating diet    EDUCATION:  Educated patient on plan of care and disease process--all questions answered. Plans discussed with patient and nursing. Reviewed and discussed with Dr. Dawit Morocho.       Signed:  SRINIVASA Berman - CNP  7/20/2021 11:40 AM     To OR today for laparoscopic cholecystectomy with intraoperative cholangiogram

## 2021-07-20 NOTE — ANESTHESIA POSTPROCEDURE EVALUATION
Department of Anesthesiology  Postprocedure Note    Patient: Magalys Curiel  MRN: [id number]  YOB: 1935  Date of evaluation: 7/20/2021  Time:  6:18 PM     Procedure Summary     Date: 07/20/21 Room / Location: [address]    Anesthesia Start: 1619 Anesthesia Stop: 1732    Procedure: LAPAROSCOPIC CHOLECYSTECTOMY WITH INTRAOPERATIVE CHOLANGIOGRAM (N/A Abdomen) Diagnosis: (Gallstone Pancreatitis)    Surgeons: Francesco Rodriguez MD Responsible Provider: Radha Finley MD    Anesthesia Type: general ASA Status: 3          Anesthesia Type: general    Filippo Phase I: Filippo Score: 9    Filippo Phase II:      Last vitals: Reviewed and per EMR flowsheets.        Anesthesia Post Evaluation    Patient location during evaluation: PACU  Patient participation: complete - patient participated  Level of consciousness: awake and alert  Airway patency: patent  Nausea & Vomiting: no nausea and no vomiting  Complications: no  Cardiovascular status: hemodynamically stable  Respiratory status: acceptable  Hydration status: stable

## 2021-07-20 NOTE — PROGRESS NOTES
Phase 1 complete, pt seen by anesthesiologist. VSS. Pain well-controlled. Laparoscopic incision sitesx4 are CDI, small amount of serous drainage per BETTY drain; ice applied. Will update family and transfer pt to the floor.

## 2021-07-21 VITALS
WEIGHT: 125 LBS | OXYGEN SATURATION: 95 % | SYSTOLIC BLOOD PRESSURE: 127 MMHG | BODY MASS INDEX: 22.15 KG/M2 | TEMPERATURE: 98.5 F | HEART RATE: 88 BPM | DIASTOLIC BLOOD PRESSURE: 72 MMHG | HEIGHT: 63 IN | RESPIRATION RATE: 16 BRPM

## 2021-07-21 PROCEDURE — APPSS15 APP SPLIT SHARED TIME 0-15 MINUTES: Performed by: NURSE PRACTITIONER

## 2021-07-21 PROCEDURE — 2580000003 HC RX 258: Performed by: SURGERY

## 2021-07-21 PROCEDURE — 6370000000 HC RX 637 (ALT 250 FOR IP): Performed by: NURSE PRACTITIONER

## 2021-07-21 PROCEDURE — APPNB30 APP NON BILLABLE TIME 0-30 MINS: Performed by: NURSE PRACTITIONER

## 2021-07-21 PROCEDURE — 99233 SBSQ HOSP IP/OBS HIGH 50: CPT | Performed by: NURSE PRACTITIONER

## 2021-07-21 PROCEDURE — 6370000000 HC RX 637 (ALT 250 FOR IP): Performed by: SURGERY

## 2021-07-21 RX ORDER — METOPROLOL SUCCINATE 100 MG/1
100 TABLET, EXTENDED RELEASE ORAL DAILY
Qty: 30 TABLET | Refills: 1 | Status: ON HOLD | OUTPATIENT
Start: 2021-07-22 | End: 2021-09-10 | Stop reason: SDUPTHER

## 2021-07-21 RX ORDER — DILTIAZEM HYDROCHLORIDE 120 MG/1
120 CAPSULE, COATED, EXTENDED RELEASE ORAL DAILY
Status: DISCONTINUED | OUTPATIENT
Start: 2021-07-21 | End: 2021-07-21 | Stop reason: HOSPADM

## 2021-07-21 RX ORDER — NITROFURANTOIN 25; 75 MG/1; MG/1
100 CAPSULE ORAL EVERY 12 HOURS SCHEDULED
Qty: 10 CAPSULE | Refills: 0 | Status: SHIPPED | OUTPATIENT
Start: 2021-07-21 | End: 2021-07-26

## 2021-07-21 RX ORDER — DILTIAZEM HYDROCHLORIDE 120 MG/1
120 CAPSULE, COATED, EXTENDED RELEASE ORAL DAILY
Qty: 30 CAPSULE | Refills: 1 | Status: SHIPPED | OUTPATIENT
Start: 2021-07-22 | End: 2022-02-03 | Stop reason: SDUPTHER

## 2021-07-21 RX ADMIN — METOPROLOL SUCCINATE 100 MG: 50 TABLET, EXTENDED RELEASE ORAL at 07:50

## 2021-07-21 RX ADMIN — DILTIAZEM HYDROCHLORIDE 30 MG: 30 TABLET, FILM COATED ORAL at 07:55

## 2021-07-21 RX ADMIN — TROSPIUM CHLORIDE 20 MG: 20 TABLET, FILM COATED ORAL at 07:50

## 2021-07-21 RX ADMIN — CETIRIZINE HYDROCHLORIDE 10 MG: 10 TABLET, FILM COATED ORAL at 07:50

## 2021-07-21 RX ADMIN — DILTIAZEM HYDROCHLORIDE 120 MG: 120 CAPSULE, EXTENDED RELEASE ORAL at 11:45

## 2021-07-21 RX ADMIN — CYANOCOBALAMIN TAB 1000 MCG 1000 MCG: 1000 TAB at 07:51

## 2021-07-21 RX ADMIN — ASPIRIN 81 MG: 81 TABLET, COATED ORAL at 07:50

## 2021-07-21 RX ADMIN — CHOLECALCIFEROL TAB 25 MCG (1000 UNIT) 1000 UNITS: 25 TAB at 07:50

## 2021-07-21 RX ADMIN — Medication 10 ML: at 08:28

## 2021-07-21 RX ADMIN — NITROFURANTOIN (MONOHYDRATE/MACROCRYSTALS) 100 MG: 75; 25 CAPSULE ORAL at 07:51

## 2021-07-21 ASSESSMENT — PAIN SCALES - GENERAL
PAINLEVEL_OUTOF10: 0

## 2021-07-21 NOTE — PROGRESS NOTES
Gastroenterology Progress Note            Rosaura Graham is a 80 y.o. female patient. 1. Acute pancreatitis, unspecified complication status, unspecified pancreatitis type    2. Atrial fibrillation with RVR (HCC)        SUBJECTIVE:   S/p kathia with normal ioc. Feels ok. Ab and throat a little sore but otherwise well. Physical    VITALS:  /81   Pulse 102   Temp 98.2 °F (36.8 °C) (Oral)   Resp 18   Ht 5' 3\" (1.6 m)   Wt 125 lb (56.7 kg)   SpO2 93%   BMI 22.14 kg/m²   TEMPERATURE:  Current - Temp: 98.2 °F (36.8 °C); Max - Temp  Av.2 °F (36.8 °C)  Min: 97.2 °F (36.2 °C)  Max: 98.4 °F (36.9 °C)    Abdomen soft, ND, minimal ttp, no HSM, Bowel sounds normal     Data      Recent Labs     21  0455 21  0459   WBC 13.0* 11.0   HGB 11.9* 12.9   HCT 35.2* 37.4   MCV 90.5 89.4    190     Recent Labs     21  0455 21  0459   * 137   K 3.7 3.2*    104   CO2 20* 21   BUN 17 16   CREATININE 0.8 0.6     Recent Labs     21  0455   AST 12*   ALT 6*   BILIDIR <0.2   BILITOT 1.0   ALKPHOS 76     Recent Labs     215 21  0459   LIPASE 22.0 17.0   AMYLASE 67 28             ASSESSMENT   1. Acute on chronic pancreatitis. - maybe from biliary source. Now s/p kathia  2. Abnormal ct --> thickened stomach on ct. PLAN    1. [id number] Celia Bui for [employer]. Pt wishes to return as outpt for EGD  2. Will arrange for outpt egd.         Kristina Castaneda MD  [address]

## 2021-07-21 NOTE — PROGRESS NOTES
[address]   Electrophysiology Progress Note   Date: 7/21/2021  Admit Date: 7/17/2021     Reason for follow up: Atrial Fibrillation    Chief Complaint:   Chief Complaint   Patient presents with    Abdominal Pain     abd 'soreness' started yesterday after lunch.  Emesis     History of Present Illness: History obtained from patient and medical record. Pavel Clemons is a 80 y.o. female with a past medical history of hypertension, hyperlipidemia, TIA and CAD per CT scan who presented with abd pain and emesis from home x 1 day. She has been treated for acute pancreatitis with lipase >3000. Found to be in atrial fibrillation RVR on admission and started on diltiazem gtt. Denies any accompanying CP or SOB on exertion. She was in atrial fibrillation on admission. Denies any past or present CP, SOB, palpitations, swelling, dizziness, or syncope. No prior hx of atrial fibrillation, arrhythmia or irregular heart beat. Interval Hx: Today, she is AF/CVR. Asked to clear patient for discharge today. No new complaints today. No major events overnight. Denies having chest pain, palpitations, shortness of breath, orthopnea/PND, cough, or dizziness. Patient seen and examined. Clinical notes reviewed. Telemetry reviewed.      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    OAB (overactive bladder) 06/17/2020    TIA (transient ischemic attack) 06/17/2020    Senile osteoporosis 01/24/2018    Hyperparathyroidism ([address] 03/30/2017    Multinodular goiter 03/30/2017    Parathyroid adenoma     Hypertensive disorder 12/13/2016    Primary osteoarthritis of right knee 10/31/2016    History of cardiovascular disorder 04/21/2015    History of arthritis 04/21/2015    Pure hypercholesterolemia 04/21/2015    Hydronephrosis 11/22/2010    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 High risk for stroke and thromboembolism. Anticoagulation is recommended.   ~ Start DOAC when OK per surgery recommendations  - Afib risk factors including age, HTN, obesity, inactivity and MILTON were discussed with patient. Risk factor modification recommended   ~ TSH 1.3 (7/17/2021)     - Treatment options including cardioversion, rate control strategy, antiarrhythmics, anticoagulation and possible ablation were discussed with patient. Risks, benefits and alternative of each treatment options were explained. All questions answered    ~ Amiodarone can be considered if rates cannot be adequately controlled, however I have discussed it and she would like to avoid if possible    ~ CV can be done after 3-4 weeks of AC or post surgery if she remains in atrial fibrillation  CAD per CT Scan    - Stress testing has been recommended when pancreatitis resolved   - OK to proceed with surgery as moderate- high cardiac risk  Hypertension   - Controlled.   Continue current medications  Pancreatitis/cholecystitis   - Per primary team and gen surgery   - S/p cholecystectomy    - Acceptable for discharge from EP standpoint  - Start Eliquis 2.5 mg bid (age>80, wt <66 hg) when OK with surgery  - Will follow up as OP and arrange for CV at that point if she remains in atrial fibrillation; scheduled  - She would like to have stress testing as OP when she recovers from surgery and given that she has been asymptomatic will arrrange    All pertinent information and plan of care discussed with the EP physician. Multiple medical conditions with risk of decompensation. Allergies: Allergies   Allergen Reactions    Benzonatate Hives    Ciprofloxacin      Leg swelling    Clarithromycin Hives    Cortisone     Prednisone      Facial swelling and redness    Sulfa Antibiotics Rash, Itching and Hives       Home Meds:  Prior to Visit Medications    Medication Sig Taking? Authorizing Provider   escitalopram (LEXAPRO) 5 MG tablet Take 5 mg by mouth daily Yes Historical Provider, MD   fluticasone (FLONASE) 50 MCG/ACT nasal spray fluticasone propionate 50 mcg/actuation nasal spray,suspension Yes Historical Provider, MD   aspirin 81 MG EC tablet Take 1 tablet by mouth daily Yes Nicolás Alcocer MD   vitamin B-12 (CYANOCOBALAMIN) 1000 MCG tablet Take 1,000 mcg by mouth daily Yes Historical Provider, MD   bisoprolol (ZEBETA) 10 MG tablet Take 10 mg by mouth daily Yes Historical Provider, MD   amLODIPine (NORVASC) 5 MG tablet Take 10 mg by mouth nightly  Yes Historical Provider, MD   solifenacin (VESICARE) 10 MG tablet Take 10 mg by mouth daily. Yes Historical Provider, MD   Cholecalciferol (VITAMIN D) 1000 UNIT CAPS Take 1,000 Units by mouth daily. Yes Historical Provider, MD   cetirizine (ZYRTEC) 10 MG tablet Take 10 mg by mouth daily.  Yes Historical Provider, MD      Scheduled Meds:   nitrofurantoin (macrocrystal-monohydrate)  100 mg Oral 2 times per day    dilTIAZem  30 mg Oral 4 times per day    aspirin  81 mg Oral Daily    cetirizine  10 mg Oral Daily    Vitamin D  1,000 Units Oral Daily    escitalopram  5 mg Oral Daily    vitamin B-12  1,000 mcg Oral Daily    sodium chloride flush  5-40 mL Intravenous 2 times per day    dilTIAZem  10 mg Intravenous Once    metoprolol succinate  100 mg Oral Daily    trospium  20 mg Oral Daily     Continuous Infusions:   sodium chloride      dilTIAZem Stopped (07/19/21 4612)     PRN Meds:HYDROmorphone, butalbital-acetaminophen-caffeine, fluticasone, perflutren lipid microspheres, sodium chloride flush, sodium chloride, ondansetron **OR** ondansetron, magnesium hydroxide, acetaminophen **OR** acetaminophen, morphine, hydrALAZINE, sodium chloride, potassium chloride **OR** potassium alternative oral replacement **OR** potassium chloride   Past Medical History:  Past Medical History:   Diagnosis Date    Allergies     Elevated cholesterol     Hypertension     Reflux     TIA (transient ischemic attack)         Past Surgical History:    has a past surgical history that includes Kidney stone surgery; Colonoscopy; Skin cancer excision; joint replacement (Right); Cystoscopy; Hemorrhoid surgery; Excision of Parathyroid Mass (05/11/2017); and Cholecystectomy, laparoscopic (N/A, 7/20/2021). Social History:  Reviewed. reports that she has never smoked. She has never used smokeless tobacco. She reports that she does not drink alcohol and does not use drugs. Family History:  Reviewed. family history includes Cancer in her mother; Migraines in her brother, mother, and sister. Denies family history of sudden cardiac death, arrhythmia, premature CAD    Review of Systems:  · Constitutional: Negative for fever, weight changes, or weakness  · Skin: Negative for bruising, bleeding, blood clots, or changes in skin pigment  · HEENT: Negative for vision changes or dysphagia  · Respiratory: Reviewed in HPI  · Cardiovascular: Reviewed in HPI  · Gastrointestinal: Negative for abdominal pain, N/V/D, constipation, or black/tarry stools  · Genito-Urinary: Negative for hematuria  · Musculoskeletal: No focal weakness  · Neurological/Psych: Negative for confusion or TIA-like symptoms.  No anxiety, depression, or insomnia    Physical Examination:  Vitals:    07/21/21 0739   BP: 124/81   Pulse: 102   Resp: 18   Temp: 98.2 °F (36.8 °C)   SpO2: 93%      In: 50 [I.V.:50]  Out: 55    Wt Readings from Last 3 Encounters:   07/21/21 125 lb (56.7 kg)   11/05/20 125 lb (56.7 kg)   08/27/20 124 lb 14.4 oz (56.7 kg)       Intake/Output Summary (Last 24 hours) at 7/21/2021 1108  Last data filed at 7/21/2021 0507  Gross per 24 hour   Intake 50 ml   Output 55 ml   Net -5 ml     Telemetry: Personally Reviewed Atrial fibrillation   · Constitutional: Cooperative and in no apparent distress, and appears well nourished  · Skin: Warm and pink; no cyanosis, bruising, or clubbing  · HEENT: Symmetric and normocephalic. Conjunctiva pink with clear sclera. Mucus membranes pink and moist.   · Cardiovascular: irregular rate and rhythm. S1 & S2, negative for murmurs. Peripheral pulses 2+, capillary refill < 3 seconds. negative elevation of JVP. · Respiratory: Respirations symmetric and unlabored. Lungs clear to auscultation bilaterally, no wheezing, crackles, or rhonchi  · Gastrointestinal: Abdomen soft and round. Bowel sounds normoactive in all quadrants. · Musculoskeletal: No focal weakness. · Neurologic/Psych: Awake and orientated to person, place and time. Calm affect, appropriate mood    Pertinent labs, diagnostic, device, and imaging results reviewed as a part of this visit    Labs:    BMP:   Recent Labs     07/19/21  0455 07/20/21  0459   * 137   K 3.7 3.2*    104   CO2 20* 21   BUN 17 16   CREATININE 0.8 0.6     Estimated Creatinine Clearance: 56 mL/min (based on SCr of 0.6 mg/dL).    CBC:   Recent Labs     07/19/21  0455 07/20/21  0459   WBC 13.0* 11.0   HGB 11.9* 12.9   HCT 35.2* 37.4   MCV 90.5 89.4    190     Thyroid:   Lab Results   Component Value Date    TSH 1.02 02/15/2017     Lipids:   Lab Results   Component Value Date    CHOL 186 07/17/2021    HDL 61 07/17/2021    TRIG 88 07/17/2021     LFTS:   Lab Results   Component Value Date    ALT 6 07/19/2021    AST 12 07/19/2021    ALKPHOS 76 07/19/2021    PROT 5.8 07/19/2021    AGRATIO 1.3 07/18/2021    BILITOT 1.0 07/19/2021     Cardiac Enzymes:   Lab Results   Component Value Date    TROPONINI <0.01 2021    TROPONINI <0.01 2021    TROPONINI <0.01 2020     Coags:   Lab Results   Component Value Date    PROTIME 13.1 2021    INR 1.15 2021     EC2021: Atrial Fibrillation    ECHO:  2021  Summary   -Abnormal arrhythmia and rapid heart rate noted. -Somewhat difficult study due to lung interference.   -Left ventricular cavity size is normal.   -Overall left ventricular systolic function appears hyperdynamic.   -Ejection fraction is visually estimated to be 70%.   -No definitive wall motion abnormalities are seen, although difficult to   determine due to abnormal arrhythmia.   -Indeterminate diastolic function.   -Left appears dilated. -Right atrium is dilated. -The aortic valve appears sclerotic but opens well. -Mild tricuspid regurgitation. RVSP = 34 mmHG. -IVC size is normal (<2.1 cm) but collapses < 50% with respiration   consistent with elevated RA pressure (8 mmHg). -Appears to be a trivial pericardial effusion. Stress Test: none    Cath: none    All questions and concerns were addressed to the patient. Alternatives to my treatment were discussed. I have discussed the above stated plan with patient and the nurse. The patient verbalized understanding and agreed with the plan. Thank you for allowing to us to participate in the care of Monet TurnerSRINIVASA Townsend Asa-MARK  [address]   Office: [phone]

## 2021-07-21 NOTE — OP NOTE
[facility]                     [address] [facility], [address] [facility]                                OPERATIVE REPORT    PATIENT NAME: Zuleima Engel                        :        1935  MED REC NO:   [id number]                          ROOM:       [id number]  ACCOUNT NO:   [de-identified]                           ADMIT DATE: 2021  PROVIDER:     Sen Rivas MD    DATE OF PROCEDURE:  2021    PREOPERATIVE DIAGNOSIS:  Biliary pancreatitis. POSTOPERATIVE DIAGNOSIS:  Biliary pancreatitis. PROCEDURE:  Laparoscopic cholecystectomy with intraoperative  cholangiogram.    SURGEON:  Sen Rivas MD    ANESTHESIA:  General endotracheal.    ESTIMATED BLOOD LOSS:  Minimal.    COMPLICATIONS:  None. SPECIMEN:  Gallbladder. OPERATIVE INDICATION AND CONSENT:  The patient is an 54-year-old female  with biliary pancreatitis. The pancreatitis has resolved with  conservative management. She is brought to the operating room today for  laparoscopic cholecystectomy. She was explained the risks, benefits,  and possible complications including risk of bleeding, bowel injury,  bile duct injury or vascular injury to the liver. DETAILS OF THE PROCEDURE:  The patient was brought to the operative  suite and placed in a supine position on the operating table. After  general endotracheal anesthesia, she was prepped and draped in the usual  sterile fashion. We made a 5-mm curvilinear incision below the umbilicus. A Veress  needle was passed into the peritoneal cavity, and after adequate  insufflation, a 5-mm Optiview trocar was placed at this site. An 11-mm  subxiphoid trocar was placed followed by two 5-mm trocars in the right  upper quadrant. The gallbladder was grasped via the lateral trocar sites and dissection  was undertaken in the hilar region. There was mild-to-moderate edema of  the gallbladder.   The most inferiorly lying structure was a small  vessel. This was singly clipped proximally, singly clipped distally and  divided. The cystic duct was then dissected free circumferentially. A  clip was placed at the gallbladder cystic duct junction. A small  transverse opening was then made in the duct. A cholangiogram catheter  was brought through a separate stab incision in the right upper  quadrant, placed in the opening of the duct. Intraoperative  cholangiography revealed moderate biliary ductal dilatation. The common  hepatic duct was well visualized, although the more proximal ducts did  not fully opacify. The common bile duct was well visualized and there  was easy flow of contrast into the duodenum. No filling defects were  noted. The cholangiogram catheter was then removed. The cystic duct  was then doubly clipped distally before it was completely transected. The cystic artery was doubly clipped proximally, singly clipped distally  and divided. As we began taking the gallbladder off of the liver bed, we were  concerned that we saw a small area of bile. This was not a definitive  finding but we did dissect out the area and placed one clip proximal,  one clip distal to the area. These clips were placed on the liver bed. The gallbladder was taken off of the liver bed with Bovie  electrocautery. There was one more small structure between the liver  bed and the gallbladder. This was felt to be a small vessel. It was  singly clipped on the liver side and then divided on the gallbladder  side. The gallbladder was detached from the liver bed, placed in an  EndoCatch bag and then removed through the subxiphoid trocar site. Trocar was then reinserted and the liver bed was inspected for bleeding  or bile leak which there was none. We did leave a 7 flat BETTY drain in  the infrahepatic space. This was brought out the most lateral trocar  site and sutured in place with a 2-0 nylon suture.   The remaining  trocars were then removed under direct visualization and the abdomen was  de-insufflated. The fascia of the subxiphoid trocar site was closed with 0 Vicryl  suture. All the trocar sites had been previously injected with 0.5%  Marcaine with epinephrine. The skin of the incisions was closed with  running 4-0 subcuticular sutures. Dermabond was then applied. The  patient tolerated the procedure without difficulty and was transferred  to the recovery room in stable condition. Donavan Meza.  Daisy Arias MD    D: 07/20/2021 17:24:02       T: 07/20/2021 17:30:13     [email or web address]  Job#: [id number]     Doc#: [id number]    CC:  Derrell Dominguez MD

## 2021-07-21 NOTE — DISCHARGE SUMMARY
[facility] -- Physician Discharge Summary     Senia Yarbrough  1935  MRN: [id number]    Admit Date: 7/17/2021  Discharge Date: No discharge date for patient encounter.     Attending MD: Pavithra Madrigal MD  Discharging MD: Pavithra Madrigal MD  PCP: Pavithra Madrigal MD [address] [phone]    Admission Diagnosis: A-fib ([address] [I48.91]  A-fib ([address] [I48.91]  DISCHARGE DIAGNOSIS: same    Full Hospital Problem List:  Active Hospital Problems    Diagnosis Date Noted    Coronary artery calcification seen on CT scan [I25.10] 07/18/2021     Priority: Medium    UTI (urinary tract infection) [N39.0] 07/19/2021    A-fib ([address] [I48.91] 07/17/2021    Acute pancreatitis [K85.90] 07/17/2021    Gastroesophageal reflux disease without esophagitis [K21.9] 06/22/2020    Hyperparathyroidism ([address] [E21.3] 03/30/2017    Pure hypercholesterolemia [E78.00] 04/21/2015           Hospital Course:  80 y.o. female who presents to the emergency department complaining of upper abdominal pain starting yesterday after eating lunch.  She reports that this feels similar to pancreatitis which she had years ago. Dominick Johnson denies alcohol consumption or  diabetes.  She still has her gallbladder.  The only prior abdominal surgery is a \"kidney stone removal \"procedure.  She reports nausea and vomiting.  Denies diarrhea, constipation, bloody or black stool.  Denies bloody or bilious or coffee-ground emesis.     Here in ER, she is found to be in AFib  This appears to be new  Placed on iv cardizem drip, this has controlled rate  meds changed to po cardizem, po metoprolol  Advised to start NOAC by EP       Pt also found to be in acute pancreatitits  RUQ u/s shows sludge  Surgery consulted  Date of Procedure: 7/20/2021     Pre-Op Diagnosis: Gallstone Pancreatitis     Post-Op Diagnosis: Same       Procedure(s):  LAPAROSCOPIC CHOLECYSTECTOMY WITH INTRAOPERATIVE CHOLANGIOGRAM       Pt is cleared by surgery post op  To go home with home care      Consults made during Hospitalization:  Jayden Kate  IP CONSULT TO GI  IP CONSULT TO [address] CONSULT TO CARDIOLOGY  IP CONSULT TO PHARMACY    Treatment team at time of Discharge: Treatment Team: Attending Provider: Rosalina Montenegro MD; Consulting Physician: Chance Stafford MD; Surgeon: Chance Stafford MD; Respiratory Therapist (Day): Klarissa Diaz RCP; Respiratory Therapist (Day): North Lozano RCP; Registered Nurse: Gerri Blakely RN; Nursing Student: Price Smallwood; Utilization Reviewer: Roz Johnson RN    Imaging Results:  Echo Complete    Result Date: 7/19/2021  Transthoracic Echocardiography Report (TTE)  Demographics   Patient Name       Caitlyn Sears   Date of Study      07/19/2021         Gender              Female   Patient Number     [id number]         Date of Birth       1935   Visit Number       [id number]          Age                 80 year(s)   Accession Number   [id number]         Room Number         3509   Corporate ID       [id number]           Reyna Root                                                            [facility]   Ordering Physician Zuleima Velásquez,  Interpreting        Erickson Lawrence MD,                     MD                 Physician           [facility]  Procedure Type of Study   TTE procedure:ECHOCARDIOGRAM COMPLETE 2D W DOPPLER W COLOR. Procedure Date Date: 07/19/2021 Start: 08:37 AM Study Location: [facility] - Echo Lab Technical Quality: Adequate visualization Indications:Atrial fibrillation. Patient Status: Routine Height: 63 inches Weight: 120 pounds BSA: 1.56 m2 BMI: 21.26 kg/m2 BP: 98/63 mmHg  Conclusions   Summary  -Abnormal arrhythmia and rapid heart rate noted.   -Somewhat difficult study due to lung interference.  -Left ventricular cavity size is normal.  -Overall left ventricular systolic function appears hyperdynamic.  -Ejection fraction is visually estimated to be 70%.  -No definitive wall motion abnormalities are seen, although difficult to  determine due to abnormal arrhythmia.  -Indeterminate diastolic function.  -Left appears dilated. -Right atrium is dilated. -The aortic valve appears sclerotic but opens well. -Mild tricuspid regurgitation. RVSP = 34 mmHG. -IVC size is normal (<2.1 cm) but collapses < 50% with respiration  consistent with elevated RA pressure (8 mmHg). -Appears to be a trivial pericardial effusion. Signature   ------------------------------------------------------------------  Electronically signed by Ramiro Dash MD, [facility] (Interpreting  physician) on 07/19/2021 at 10:12 AM  ------------------------------------------------------------------   Findings   Left Ventricle  Left ventricular cavity size is normal.  Overall left ventricular systolic function appears hyperdynamic. Ejection fraction is visually estimated to be70%. No definitive wall motion abnormalities are seen, although difficult to  determine due to abnormal arrhythmia. Indeterminate diastolic function. Mitral Valve  Mitral valve is structurally normal.  No evidence of mitral regurgitation. Left Atrium  Left appears is dilated. Aortic Valve  The aortic valve appears sclerotic but opens well. No evidence of aortic valve regurgitation. Aorta  The aortic root is normal in size. Right Ventricle  The right ventricle is normal in size and function. TASPE = 1.9 cm. RVS velocity is 12.6 cm/s. RVSP = 34 mmHG. Tricuspid Valve  Tricuspid valve is structurally normal.  Mild tricuspid regurgitation. Right Atrium  Right atrium is dilated. Pulmonic Valve  The pulmonic valve is not well visualized. No evidence of pulmonic valve regurgitation. Pericardial Effusion  Appears to be a trivial pericardial effusion. Pleural Effusion  No pleural effusion.    Miscellaneous  IVC size is normal (<2.1 cm) but collapses < 50% with respiration consistent  with elevated RA pressure (8 mmHg). M-Mode/2D Measurements (cm)   LV Diastolic Dimension: 6.57 cm LV Systolic Dimension: 8.55 cm  LV Septum Diastolic: 2.84 cm  LV PW Diastolic: 6.72 cm        AO Root Dimension: 2.9 cm                                  LA Dimension: 3.7 cm                                  LA Area: 14.4 cm2  LVOT: 2 cm                      LA volume/Index: 33.2 ml /21 ml/m2  Doppler Measurements   AV Peak Velocity: 126 cm/s  AV Peak Gradient: 6.35 mmHg  AV Mean Gradient: 3 mmHg  LVOT Peak Velocity: 94 cm/s  AV Area (Continuity):2.05 cm2   TR Velocity:253 cm/s  TR Gradient:25.6 mmHg  Estimated RAP:8 mmHg  Estimated RVSP: 34 mmHg  E' Septal Velocity: 5.77 cm/s  E' Lateral Velocity: 9.79 cm/s   Aortic Valve   Peak Velocity: 126 cm/s     Mean Velocity: 83 cm/s  Peak Gradient: 6.35 mmHg    Mean Gradient: 3 mmHg  Area (continuity): 2.05 cm2  AV VTI: 25.1 cm  Aorta   Aortic Root: 2.9 cm  LVOT Diameter: 2 cm      CT ABDOMEN PELVIS W IV CONTRAST Additional Contrast? None    Result Date: 7/18/2021  EXAMINATION: CT OF THE ABDOMEN AND PELVIS WITH CONTRAST 7/17/2021 11:05 am TECHNIQUE: CT of the abdomen and pelvis was performed with the administration of intravenous contrast. Multiplanar reformatted images are provided for review. Dose modulation, iterative reconstruction, and/or weight based adjustment of the mA/kV was utilized to reduce the radiation dose to as low as reasonably achievable. COMPARISON: 08/25/2020 HISTORY: ORDERING SYSTEM PROVIDED HISTORY: abd pain TECHNOLOGIST PROVIDED HISTORY: Reason for exam:->abd pain Additional Contrast?->None Decision Support Exception - unselect if not a suspected or confirmed emergency medical condition->Emergency Medical Condition (MA) Reason for Exam: Abdominal Pain (abd 'soreness' started yesterday after lunch. ) Acuity: Acute Type of Exam: Initial FINDINGS: Lower Chest: No acute infiltrate at the lung bases. Plate-like atelectasis bilaterally.   Cardiomegaly with scattered coronary vascular calcification. Organs: Mild hepatic steatosis with no focal abnormality. The spleen and adrenal glands are unremarkable. No acute biliary findings. No solid renal mass or significant hydronephrosis. Bilateral renal cysts, the largest on the left measuring 3.5 cm. Nonobstructive bilateral nephrolithiasis. Diffuse peripancreatic inflammatory changes consistent with acute pancreatitis. No focal peripancreatic fluid collection or evidence of necrosis. Scattered pancreatic calcifications, the largest in the body measuring 10 mm consistent with chronic pancreatitis. GI/Bowel: No pericolonic inflammatory changes. Scattered diverticulosis with no acute features. Mild retained stool particularly in the ascending colon. No small bowel distension. Mild gastric wall thickening with distension of the proximal stomach. Mild mucosal enhancement. The duodenal sweep is unremarkable. Pelvis: No pelvic mass or free pelvic fluid. Calcified degenerated uterine fibroids. Mild distention of the urinary bladder. Peritoneum/Retroperitoneum: The abdominal aorta is normal in caliber with diffuse atherosclerotic plaque. No retroperitoneal adenopathy. No upper abdominal ascites. Bones/Soft Tissues: No acute osseous or soft tissue abnormality. The bones are diffusely osteopenic. Mild degenerative changes throughout the thoracic and lumbar spine. 1. Acute pancreatitis. Diffuse peripancreatic inflammatory changes with no focal peripancreatic fluid collection. 2. Colonic diverticulosis with no acute features. Mild gastric wall thickening with mucosal enhancement, possibly a mild gastritis. 3. Nonobstructive bilateral nephrolithiasis. Bilateral renal cysts. 4. Mild hepatic steatosis. US GALLBLADDER RUQ    Result Date: 7/19/2021  EXAMINATION: RIGHT UPPER QUADRANT ULTRASOUND 7/19/2021 10:32 am COMPARISON: None.  HISTORY: ORDERING SYSTEM PROVIDED HISTORY: pancreatitis TECHNOLOGIST PROVIDED HISTORY: Reason for exam:->pancreatitis Reason for Exam: pancreatitis Acuity: Acute Type of Exam: Initial Additional signs and symptoms: patient states upper abd pain/bloating/heartburn Relevant Medical/Surgical History: na FINDINGS: LIVER:  The liver demonstrates normal echogenicity without evidence of intrahepatic biliary ductal dilatation. BILIARY SYSTEM: Gallbladder polyp noted measuring 0.4 cm. Sludge is also noted within the gallbladder. There is gallbladder wall thickening measuring up to 0.7 cm likely secondary to pancreatitis. No gallbladder stones or pericholecystic fluid noted. Negative sonographic Rossi's sign. Common bile duct measures 0.9 cm. RIGHT KIDNEY: Multiple cysts are noted within the right kidney with the largest measuring approximately 1.0 x 0.6 x 0.8 cm and simple in appearance. The right kidney is otherwise grossly unremarkable without evidence of hydronephrosis. PANCREAS:  The pancreas appears edematous, heterogeneous and hypoechoic consistent with pancreatitis. The pancreatic duct is prominent measuring up to 0.3 cm. OTHER: No evidence of right upper quadrant ascites. Hypoechoic, heterogeneous and edematous appearing pancreas consistent with acute pancreatitis. Gallbladder wall thickening, likely secondary to pancreatitis. Prominent/dilated common bile duct measuring up to 0.9 cm, however this may be age related. Clinical correlation is recommended. Gallbladder polyp and sludge noted. XR CHEST PORTABLE    Result Date: 7/17/2021  EXAMINATION: ONE XRAY VIEW OF THE CHEST 7/17/2021 10:27 am COMPARISON: 08/25/2020 HISTORY: ORDERING SYSTEM PROVIDED HISTORY: new onset afib TECHNOLOGIST PROVIDED HISTORY: Reason for exam:->new onset afib Reason for Exam: Abdominal Pain (abd 'soreness' started yesterday after lunch. ) Acuity: Acute Type of Exam: Initial FINDINGS: The cardiac silhouette is prominent. Mediastinal and hilar contours are stable. No vascular dilation is appreciated.   No consolidation is evident. Stable cardiomegaly. No acute cardiopulmonary disease. FL CHOLANGIOGRAM OR    Result Date: 7/20/2021  EXAMINATION: SPOT IMAGES FROM AN INTRAOPERATIVE CHOLANGIOGRAM 7/20/2021 3:08 pm COMPARISON: None. HISTORY: ORDERING SYSTEM PROVIDED HISTORY: pain TECHNOLOGIST PROVIDED HISTORY: Reason for exam:->pain Reason for Exam: 15 secs fluoro FLUOROSCOPY DOSE AND TYPE OR TIME AND EXPOSURES: 7 digital spot views were provided. Total fluoroscopy time is 15.8 seconds. Cumulative dose is 2.2 mGy. FINDINGS: No common duct filling defects are identified. Common duct dilation is noted. Contrast is seen flowing into the duodenum. No evidence for choledocholithiasis. Discharge Exam:  See progress note from day of d/c    Disposition: home    Condition: stable    Discharge Medications:   Gulshan, Reshma TAM Medication Instructions GFO:[id number]    Printed on:07/21/21 2193   Medication Information                      apixaban (ELIQUIS) 2.5 MG TABS tablet  Take 1 tablet by mouth 2 times daily             aspirin 81 MG EC tablet  Take 1 tablet by mouth daily             cetirizine (ZYRTEC) 10 MG tablet  Take 10 mg by mouth daily. Cholecalciferol (VITAMIN D) 1000 UNIT CAPS  Take 1,000 Units by mouth daily. dilTIAZem (CARDIZEM CD) 120 MG extended release capsule  Take 1 capsule by mouth daily             escitalopram (LEXAPRO) 5 MG tablet  Take 5 mg by mouth daily             fluticasone (FLONASE) 50 MCG/ACT nasal spray  fluticasone propionate 50 mcg/actuation nasal spray,suspension             metoprolol succinate (TOPROL XL) 100 MG extended release tablet  Take 1 tablet by mouth daily             nitrofurantoin, macrocrystal-monohydrate, (MACROBID) 100 MG capsule  Take 1 capsule by mouth every 12 hours for 5 days             solifenacin (VESICARE) 10 MG tablet  Take 10 mg by mouth daily.              vitamin B-12 (CYANOCOBALAMIN) 1000 MCG tablet  Take 1,000 mcg by mouth daily Allergies: Allergies   Allergen Reactions    Benzonatate Hives    Ciprofloxacin      Leg swelling    Clarithromycin Hives    Cortisone     Prednisone      Facial swelling and redness    Sulfa Antibiotics Rash, Itching and Hives       Follow up Instructions: Follow-up with PCP: Issac Berry MD in 2 wk .       Total time spent on day of discharge including face-to-face visit, examination, documentation, counseling, preparation of discharge plans and followup, and discharge medicine reconciliation and presciptions is 33 minutes    Signed:  Issac Berry MD  7/21/2021

## 2021-07-21 NOTE — PROGRESS NOTES
[facility]        Progress Note    Patient Name: Ct Beebe  MRN: [id number]  YOB: 1935  Date of Evaluation: 2021    Postoperative Day #1    Subjective:  No acute events overnight  Pain well controlled  No nausea or vomiting, tolerated general diet  Passing flatus, no stool  Resting in bed at this time      Vital Signs:  Patient Vitals for the past 24 hrs:   BP Temp Temp src Pulse Resp SpO2 Weight   21 1213 127/72 98.5 °F (36.9 °C) Oral 88 16 95 % --   21 1130 124/74 98.3 °F (36.8 °C) Oral 83 16 93 % --   21 0739 124/81 98.2 °F (36.8 °C) Oral 102 18 93 % 125 lb (56.7 kg)   21 0508 126/77 97.8 °F (36.6 °C) Temporal 92 18 92 % --   21 0047 103/66 -- -- 79 17 93 % --   21 2214 -- -- -- -- -- 93 % --   21 2200 108/72 97.3 °F (36.3 °C) Axillary 76 17 96 % --   21 1830 125/81 98.3 °F (36.8 °C) Oral 99 16 96 % --   21 1815 (!) 140/79 -- -- 113 17 97 % --   21 1800 135/82 -- -- 93 22 97 % --   21 1740 (!) 115/94 98 °F (36.7 °C) Temporal 104 27 91 % --   21 1735 134/61 -- -- 115 24 92 % --   21 1730 (!) 144/80 -- -- 125 18 91 % --   21 1726 138/70 97.5 °F (36.4 °C) Temporal 97 19 98 % --   21 1438 129/74 97.3 °F (36.3 °C) Temporal 68 20 95 % --      TEMPERATURE HISTORY 24H: Temp (24hrs), Av.3 °F (36.8 °C), Min:97.3 °F (36.3 °C), Max:98.5 °F (36.9 °C)    BLOOD PRESSURE HISTORY: Systolic (50LNZ), QDV:875 , Min:103 , UQM:533    Diastolic (08UFH), QQW:32, Min:55, Max:94      Intake/Output:  I/O last 3 completed shifts: In: 50 [I.V.:50]  Out: 55 [Drains:55]  No intake/output data recorded.   Drain/tube Output:  Closed/Suction Drain Right RUQ Bulb 7 Faroese-Output (ml): 25 ml    Physical Exam:  General: awake, alert, oriented to  person, place, time  Lungs: unlabored respirations  Abdomen: soft, nondistended, appropriate incisional tenderness only, bowel sounds normal   Drain: serosanguinous   Skin/Wound: healing well, no drainage, no erythema, well approximated    Labs:  CBC:    Recent Labs     07/19/21  0455 07/20/21 0459   WBC 13.0* 11.0   HGB 11.9* 12.9   HCT 35.2* 37.4    190     BMP:    Recent Labs     07/19/21  0455 07/20/21 0459   * 137   K 3.7 3.2*    104   CO2 20* 21   BUN 17 16   CREATININE 0.8 0.6   GLUCOSE 92 120*     Hepatic:    Recent Labs     07/19/21 0455   AST 12*   ALT 6*   BILITOT 1.0   ALKPHOS 76     Amylase:    Lab Results   Component Value Date    AMYLASE 28 07/20/2021    AMYLASE 67 07/19/2021    AMYLASE 362 07/18/2021     Lipase:    Lab Results   Component Value Date    LIPASE 17.0 07/20/2021    LIPASE 22.0 07/19/2021    LIPASE 442.0 07/18/2021      Mag:    Lab Results   Component Value Date    MG 1.80 03/15/2017    MG 1.50 03/14/2017     Phos:   No results found for: PHOS   Coags:   Lab Results   Component Value Date    PROTIME 13.1 07/18/2021    INR 1.15 07/18/2021    APTT 30.3 07/17/2021       Cultures:  Anaerobic culture  No results found for: LABANAE  Fungus stain  No results found for requested labs within last 30 days. Gram stain  No results found for requested labs within last 30 days. Organism  Lab Results   Component Value Date/Time    ORG Staphylococcus epidermidis (A) 07/17/2021 10:26 AM     Surgical culture  No results found for: CXSURG  Blood culture 1  No results found for requested labs within last 30 days. Blood culture 2  No results found for requested labs within last 30 days. Fecal occult  No results found for requested labs within last 30 days. GI bacterial pathogens by PCR  No results found for requested labs within last 30 days. C. difficile  No results found for requested labs within last 30 days.      Urine culture  Lab Results   Component Value Date    LABURIN >100,000 CFU/ml 07/17/2021       Pathology:  Pathology results pending     Imaging:  I have personally reviewed the following films: FL CHOLANGIOGRAM OR    Result Date: 7/20/2021  EXAMINATION: SPOT IMAGES FROM AN INTRAOPERATIVE CHOLANGIOGRAM 7/20/2021 3:08 pm COMPARISON: None. HISTORY: ORDERING SYSTEM PROVIDED HISTORY: pain TECHNOLOGIST PROVIDED HISTORY: Reason for exam:->pain Reason for Exam: 15 secs fluoro FLUOROSCOPY DOSE AND TYPE OR TIME AND EXPOSURES: 7 digital spot views were provided. Total fluoroscopy time is 15.8 seconds. Cumulative dose is 2.2 mGy. FINDINGS: No common duct filling defects are identified. Common duct dilation is noted. Contrast is seen flowing into the duodenum. No evidence for choledocholithiasis. Scheduled Meds:   dilTIAZem  120 mg Oral Daily    nitrofurantoin (macrocrystal-monohydrate)  100 mg Oral 2 times per day    aspirin  81 mg Oral Daily    cetirizine  10 mg Oral Daily    Vitamin D  1,000 Units Oral Daily    escitalopram  5 mg Oral Daily    vitamin B-12  1,000 mcg Oral Daily    sodium chloride flush  5-40 mL Intravenous 2 times per day    metoprolol succinate  100 mg Oral Daily    trospium  20 mg Oral Daily     Continuous Infusions:   sodium chloride       PRN Meds:. HYDROmorphone, butalbital-acetaminophen-caffeine, fluticasone, perflutren lipid microspheres, sodium chloride flush, sodium chloride, ondansetron **OR** ondansetron, magnesium hydroxide, acetaminophen **OR** acetaminophen, morphine, hydrALAZINE, sodium chloride, potassium chloride **OR** potassium alternative oral replacement **OR** potassium chloride      Assessment:  80 y.o. female admitted with   1. Acute pancreatitis, unspecified complication status, unspecified pancreatitis type    2. Atrial fibrillation with RVR ([address]        Status-post laparoscopic cholecystectomy with intraoperative cholangiogram on 7/20/2021 for biliary pancreatitis  Atrial fibrillation  Hypertension       Plan:  1. Drain care and monitoring output, will discharge with drain in place  2. General diet as tolerated  3.  IV hydration until PO intake is adequate; monitor and correct electrolytes  4. Activity as tolerated--PT/OT following  5. Pulmonary toilet, incentive spirometry  6. PRN analgesics and antiemetics--minimizing narcotics as tolerated, transition to PO  7. DVT prophylaxis with Lovenox and SCD's  8. Management of medical comorbid etiologies per primary team and consulting services  9. Disposition: Okay for discharge home from a surgical perspective; follow up with Dr. Holli Weston in 5 days for drain removal    EDUCATION:  Educated patient on plan of care and disease process--all questions answered. Plans discussed with patient and nursing. Reviewed and discussed with Dr. Holli Weston.       Signed:  SRINIVASA Da Silva - CNP  7/21/2021 12:33 PM

## 2021-07-21 NOTE — PROGRESS NOTES
Progress Note - Dr. Panda Serrano - Internal Medicine  PCP: Sandrea Schaumann, MD [id number]  / [address]arosa [phone]    Hospital Day: 4  Code Status: Full Code  Current Diet: ADULT DIET; Regular        CC: follow up on medical issues    Subjective:   Carolyn De León is a 80 y.o. female. She denies new problems    Tolerated lap kathia yest  Still in fib  Will need NOAC post op  Feels better    She denies chest pain, denies shortness of breath, complains of nausea,  denies emesis. 10 system Review of Systems is reviewed with patient, and pertinent positives are noted in HPI above . Otherwise, Review of systems is negative. I have reviewed the patient's medical and social history in detail and updated the computerized patient record. To recap: She  has a past medical history of Allergies, Elevated cholesterol, Hypertension, Reflux, and TIA (transient ischemic attack). . She  has a past surgical history that includes Kidney stone surgery; Colonoscopy; Skin cancer excision; joint replacement (Right); Cystoscopy; Hemorrhoid surgery; Excision of Parathyroid Mass (05/11/2017); and Cholecystectomy, laparoscopic (N/A, 7/20/2021). . She  reports that she has never smoked. She has never used smokeless tobacco. She reports that she does not drink alcohol and does not use drugs. .        Active Hospital Problems    Diagnosis Date Noted    Coronary artery calcification seen on CT scan [I25.10] 07/18/2021     Priority: Medium    UTI (urinary tract infection) [N39.0] 07/19/2021    A-fib (HCC) [I48.91] 07/17/2021    Acute pancreatitis [K85.90] 07/17/2021    Gastroesophageal reflux disease without esophagitis [K21.9] 06/22/2020    Hyperparathyroidism ([facility] [address] [E21.3] 03/30/2017    Pure hypercholesterolemia [E78.00] 04/21/2015       Current Facility-Administered Medications: HYDROmorphone HCl PF (DILAUDID) injection 0.5 mg, 0.5 mg, Intravenous, Q3H PRN  nitrofurantoin (macrocrystal-monohydrate) (MACROBID) capsule 100 mg, 100 mg, Oral, 2 times per day  dilTIAZem (CARDIZEM) tablet 30 mg, 30 mg, Oral, 4 times per day  aspirin EC tablet 81 mg, 81 mg, Oral, Daily  butalbital-acetaminophen-caffeine (FIORICET, ESGIC) per tablet 1 tablet, 1 tablet, Oral, Q12H PRN  cetirizine (ZYRTEC) tablet 10 mg, 10 mg, Oral, Daily  Vitamin D (CHOLECALCIFEROL) tablet 1,000 Units, 1,000 Units, Oral, Daily  escitalopram (LEXAPRO) tablet 5 mg, 5 mg, Oral, Daily  fluticasone (FLONASE) 50 MCG/ACT nasal spray 1 spray, 1 spray, Each Nostril, Daily PRN  vitamin B-12 (CYANOCOBALAMIN) tablet 1,000 mcg, 1,000 mcg, Oral, Daily  perflutren lipid microspheres (DEFINITY) injection 1.65 mg, 1.5 mL, Intravenous, ONCE PRN  sodium chloride flush 0.9 % injection 5-40 mL, 5-40 mL, Intravenous, 2 times per day  sodium chloride flush 0.9 % injection 10 mL, 10 mL, Intravenous, PRN  0.9 % sodium chloride infusion, 25 mL, Intravenous, PRN  ondansetron (ZOFRAN-ODT) disintegrating tablet 4 mg, 4 mg, Oral, Q8H PRN **OR** ondansetron (ZOFRAN) injection 4 mg, 4 mg, Intravenous, Q6H PRN  magnesium hydroxide (MILK OF MAGNESIA) 400 MG/5ML suspension 30 mL, 30 mL, Oral, Daily PRN  acetaminophen (TYLENOL) tablet 650 mg, 650 mg, Oral, Q6H PRN **OR** acetaminophen (TYLENOL) suppository 650 mg, 650 mg, Rectal, Q6H PRN  dilTIAZem injection 10 mg, 10 mg, Intravenous, Once **FOLLOWED BY** dilTIAZem 125 mg in dextrose 5 % 125 mL infusion, 5 mg/hr, Intravenous, Continuous  morphine (PF) injection 1 mg, 1 mg, Intravenous, Q4H PRN  metoprolol succinate (TOPROL XL) extended release tablet 100 mg, 100 mg, Oral, Daily  trospium (SANCTURA) tablet 20 mg, 20 mg, Oral, Daily  hydrALAZINE (APRESOLINE) injection 10 mg, 10 mg, Intravenous, Q6H PRN  0.9 % sodium chloride bolus, 500 mL, Intravenous, PRN  potassium chloride (KLOR-CON M) extended release tablet 40 mEq, 40 mEq, Oral, PRN **OR** potassium bicarb-citric acid (EFFER-K) effervescent tablet 40 mEq, 40 mEq, Oral, PRN **OR** potassium chloride 10 mEq/100 mL IVPB (Peripheral Line), 10 mEq, Intravenous, PRN         Objective:  /81   Pulse 102   Temp 98.2 °F (36.8 °C) (Oral)   Resp 18   Ht 5' 3\" (1.6 m)   Wt 125 lb (56.7 kg)   SpO2 93%   BMI 22.14 kg/m²      Patient Vitals for the past 24 hrs:   BP Temp Temp src Pulse Resp SpO2 Weight   07/21/21 0739 124/81 98.2 °F (36.8 °C) Oral 102 18 93 % 125 lb (56.7 kg)   07/21/21 0508 126/77 97.8 °F (36.6 °C) Temporal 92 18 92 % --   07/21/21 0047 103/66 -- -- 79 17 93 % --   07/20/21 2214 -- -- -- -- -- 93 % --   07/20/21 2200 108/72 97.3 °F (36.3 °C) Axillary 76 17 96 % --   07/20/21 1830 125/81 98.3 °F (36.8 °C) Oral 99 16 96 % --   07/20/21 1815 (!) 140/79 -- -- 113 17 97 % --   07/20/21 1800 135/82 -- -- 93 22 97 % --   07/20/21 1740 (!) 115/94 98 °F (36.7 °C) Temporal 104 27 91 % --   07/20/21 1735 134/61 -- -- 115 24 92 % --   07/20/21 1730 (!) 144/80 -- -- 125 18 91 % --   07/20/21 1726 138/70 97.5 °F (36.4 °C) Temporal 97 19 98 % --   07/20/21 1438 129/74 97.3 °F (36.3 °C) Temporal 68 20 95 % --   07/20/21 1145 125/78 97.2 °F (36.2 °C) Oral 90 16 95 % --   07/20/21 0845 105/70 97.7 °F (36.5 °C) Oral 105 18 97 % --     Patient Vitals for the past 96 hrs (Last 3 readings):   Weight   07/21/21 0739 125 lb (56.7 kg)   07/19/21 0716 133 lb 6.4 oz (60.5 kg)   07/17/21 0958 120 lb (54.4 kg)           Intake/Output Summary (Last 24 hours) at 7/21/2021 0816  Last data filed at 7/21/2021 0507  Gross per 24 hour   Intake 50 ml   Output 55 ml   Net -5 ml         Physical Exam:   /81   Pulse 102   Temp 98.2 °F (36.8 °C) (Oral)   Resp 18   Ht 5' 3\" (1.6 m)   Wt 125 lb (56.7 kg)   SpO2 93%   BMI 22.14 kg/m²   General appearance: alert, appears stated age and cooperative  Head: Normocephalic, without obvious abnormality, atraumatic  Lungs: clear to auscultation bilaterally  Heart: irreg irreg  Abdomen: mild RUQ tend, bs+  Extremities: extremities normal, atraumatic, no cyanosis or edema    Labs:  Lab Results Component Value Date    WBC 11.0 07/20/2021    HGB 12.9 07/20/2021    HCT 37.4 07/20/2021     07/20/2021    CHOL 186 07/17/2021    TRIG 88 07/17/2021    HDL 61 (H) 07/17/2021    ALT 6 (L) 07/19/2021    AST 12 (L) 07/19/2021     07/20/2021    K 3.2 (L) 07/20/2021     07/20/2021    CREATININE 0.6 07/20/2021    BUN 16 07/20/2021    CO2 21 07/20/2021    TSH 1.02 02/15/2017    INR 1.15 (H) 07/18/2021    LABA1C 5.5 08/26/2020    LABMICR YES 07/17/2021     Lab Results   Component Value Date    TROPONINI <0.01 07/17/2021       Recent Imaging Results are Reviewed:  Echo Complete    Result Date: 7/19/2021  Transthoracic Echocardiography Report (TTE)  Demographics   Patient Name       Rachel Seo   Date of Study      07/19/2021         Gender              Female   Patient Number     [id number]         Date of Birth       1935   Visit Number       [id number]          Age                 80 year(s)   Accession Number   [id number]         Room Number         8435   Corporate ID       [id number]           Sonographer         Laci Tarango                                                            SHEILA   Ordering Physician Leopoldo Counts,  Interpreting        Breezy Ruff MD, MD                 Physician           [facility]  Procedure Type of Study   TTE procedure:ECHOCARDIOGRAM COMPLETE 2D W DOPPLER W COLOR. Procedure Date Date: 07/19/2021 Start: 08:37 AM Study Location: [facility] - Echo Lab Technical Quality: Adequate visualization Indications:Atrial fibrillation. Patient Status: Routine Height: 63 inches Weight: 120 pounds BSA: 1.56 m2 BMI: 21.26 kg/m2 BP: 98/63 mmHg  Conclusions   Summary  -Abnormal arrhythmia and rapid heart rate noted.   -Somewhat difficult study due to lung interference.  -Left ventricular cavity size is normal.  -Overall left ventricular systolic function appears hyperdynamic.  -Ejection fraction is visually estimated to be 70%.  -No definitive wall (cm)   LV Diastolic Dimension: 1.84 cm LV Systolic Dimension: 7.34 cm  LV Septum Diastolic: 8.28 cm  LV PW Diastolic: 0.71 cm        AO Root Dimension: 2.9 cm                                  LA Dimension: 3.7 cm                                  LA Area: 14.4 cm2  LVOT: 2 cm                      LA volume/Index: 33.2 ml /21 ml/m2  Doppler Measurements   AV Peak Velocity: 126 cm/s  AV Peak Gradient: 6.35 mmHg  AV Mean Gradient: 3 mmHg  LVOT Peak Velocity: 94 cm/s  AV Area (Continuity):2.05 cm2   TR Velocity:253 cm/s  TR Gradient:25.6 mmHg  Estimated RAP:8 mmHg  Estimated RVSP: 34 mmHg  E' Septal Velocity: 5.77 cm/s  E' Lateral Velocity: 9.79 cm/s   Aortic Valve   Peak Velocity: 126 cm/s     Mean Velocity: 83 cm/s  Peak Gradient: 6.35 mmHg    Mean Gradient: 3 mmHg  Area (continuity): 2.05 cm2  AV VTI: 25.1 cm  Aorta   Aortic Root: 2.9 cm  LVOT Diameter: 2 cm      CT ABDOMEN PELVIS W IV CONTRAST Additional Contrast? None    Result Date: 7/18/2021  EXAMINATION: CT OF THE ABDOMEN AND PELVIS WITH CONTRAST 7/17/2021 11:05 am TECHNIQUE: CT of the abdomen and pelvis was performed with the administration of intravenous contrast. Multiplanar reformatted images are provided for review. Dose modulation, iterative reconstruction, and/or weight based adjustment of the mA/kV was utilized to reduce the radiation dose to as low as reasonably achievable. COMPARISON: 08/25/2020 HISTORY: ORDERING SYSTEM PROVIDED HISTORY: abd pain TECHNOLOGIST PROVIDED HISTORY: Reason for exam:->abd pain Additional Contrast?->None Decision Support Exception - unselect if not a suspected or confirmed emergency medical condition->Emergency Medical Condition (MA) Reason for Exam: Abdominal Pain (abd 'soreness' started yesterday after lunch. ) Acuity: Acute Type of Exam: Initial FINDINGS: Lower Chest: No acute infiltrate at the lung bases. Plate-like atelectasis bilaterally. Cardiomegaly with scattered coronary vascular calcification.  Organs: Mild hepatic steatosis with no focal abnormality. The spleen and adrenal glands are unremarkable. No acute biliary findings. No solid renal mass or significant hydronephrosis. Bilateral renal cysts, the largest on the left measuring 3.5 cm. Nonobstructive bilateral nephrolithiasis. Diffuse peripancreatic inflammatory changes consistent with acute pancreatitis. No focal peripancreatic fluid collection or evidence of necrosis. Scattered pancreatic calcifications, the largest in the body measuring 10 mm consistent with chronic pancreatitis. GI/Bowel: No pericolonic inflammatory changes. Scattered diverticulosis with no acute features. Mild retained stool particularly in the ascending colon. No small bowel distension. Mild gastric wall thickening with distension of the proximal stomach. Mild mucosal enhancement. The duodenal sweep is unremarkable. Pelvis: No pelvic mass or free pelvic fluid. Calcified degenerated uterine fibroids. Mild distention of the urinary bladder. Peritoneum/Retroperitoneum: The abdominal aorta is normal in caliber with diffuse atherosclerotic plaque. No retroperitoneal adenopathy. No upper abdominal ascites. Bones/Soft Tissues: No acute osseous or soft tissue abnormality. The bones are diffusely osteopenic. Mild degenerative changes throughout the thoracic and lumbar spine. 1. Acute pancreatitis. Diffuse peripancreatic inflammatory changes with no focal peripancreatic fluid collection. 2. Colonic diverticulosis with no acute features. Mild gastric wall thickening with mucosal enhancement, possibly a mild gastritis. 3. Nonobstructive bilateral nephrolithiasis. Bilateral renal cysts. 4. Mild hepatic steatosis. US GALLBLADDER RUQ    Result Date: 7/19/2021  EXAMINATION: RIGHT UPPER QUADRANT ULTRASOUND 7/19/2021 10:32 am COMPARISON: None.  HISTORY: ORDERING SYSTEM PROVIDED HISTORY: pancreatitis TECHNOLOGIST PROVIDED HISTORY: Reason for exam:->pancreatitis Reason for Exam: pancreatitis Acuity: Acute Type of Exam: Initial Additional signs and symptoms: patient states upper abd pain/bloating/heartburn Relevant Medical/Surgical History: na FINDINGS: LIVER:  The liver demonstrates normal echogenicity without evidence of intrahepatic biliary ductal dilatation. BILIARY SYSTEM: Gallbladder polyp noted measuring 0.4 cm. Sludge is also noted within the gallbladder. There is gallbladder wall thickening measuring up to 0.7 cm likely secondary to pancreatitis. No gallbladder stones or pericholecystic fluid noted. Negative sonographic Rossi's sign. Common bile duct measures 0.9 cm. RIGHT KIDNEY: Multiple cysts are noted within the right kidney with the largest measuring approximately 1.0 x 0.6 x 0.8 cm and simple in appearance. The right kidney is otherwise grossly unremarkable without evidence of hydronephrosis. PANCREAS:  The pancreas appears edematous, heterogeneous and hypoechoic consistent with pancreatitis. The pancreatic duct is prominent measuring up to 0.3 cm. OTHER: No evidence of right upper quadrant ascites. Hypoechoic, heterogeneous and edematous appearing pancreas consistent with acute pancreatitis. Gallbladder wall thickening, likely secondary to pancreatitis. Prominent/dilated common bile duct measuring up to 0.9 cm, however this may be age related. Clinical correlation is recommended. Gallbladder polyp and sludge noted. XR CHEST PORTABLE    Result Date: 7/17/2021  EXAMINATION: ONE XRAY VIEW OF THE CHEST 7/17/2021 10:27 am COMPARISON: 08/25/2020 HISTORY: ORDERING SYSTEM PROVIDED HISTORY: new onset afib TECHNOLOGIST PROVIDED HISTORY: Reason for exam:->new onset afib Reason for Exam: Abdominal Pain (abd 'soreness' started yesterday after lunch. ) Acuity: Acute Type of Exam: Initial FINDINGS: The cardiac silhouette is prominent. Mediastinal and hilar contours are stable. No vascular dilation is appreciated. No consolidation is evident. Stable cardiomegaly.  No acute cardiopulmonary disease. Assessment and Plan:  Principal Problem:    A-fib ([address] -Established problem. Stable. Still in fib  Plan: will need anti-coag post op. EP on board  Active Problems:    Coronary artery calcification seen on CT scan    Hyperparathyroidism ([address] -Established problem. Stable. Plan: Continue present orders/plan. Gastroesophageal reflux disease without esophagitis -Established problem. Stable. Plan: cont ppi    Pure hypercholesterolemia -Established problem. Stable. Plan: Continue present orders/plan. Acute pancreatitis -Established problem. Stable. 2/2 gallbladder sludge? Plan: s/p lap kathia 7/20.  Post op mgmt per surgery    UTI (urinary tract infection)  Plan: cont abx    Disp - hopefully snf in next 1-2d        Peewee Willis MD  7/21/2021

## 2021-07-21 NOTE — PROGRESS NOTES
CLINICAL PHARMACY NOTE: MEDS TO BEDS    Total # of Prescriptions Filled: 4   The following medications were delivered to the patient:  · Eliquis 2.5 mg  · Diltiazem    · Metoprolol Er 100 mg  · Nitrofurantoin 100 mg    Additional Documentation:    Delivered to Patient daughter-Signed  Latonia Rojas CPhT

## 2021-07-21 NOTE — PLAN OF CARE
Problem: Pain:  Goal: Pain level will decrease  Description: Pain level will decrease  Outcome: Ongoing  Goal: Control of acute pain  Description: Control of acute pain  Outcome: Ongoing  Goal: Control of chronic pain  Description: Control of chronic pain  Outcome: Ongoing     Problem:  Activity:  Goal: Ability to tolerate increased activity will improve  Description: Ability to tolerate increased activity will improve  Outcome: Ongoing  Goal: Expression of feelings of increased energy will increase  Description: Expression of feelings of increased energy will increase  Outcome: Ongoing     Problem: Cardiac:  Goal: Ability to maintain an adequate cardiac output will improve  Description: Ability to maintain an adequate cardiac output will improve  Outcome: Ongoing  Goal: Complications related to the disease process, condition or treatment will be avoided or minimized  Description: Complications related to the disease process, condition or treatment will be avoided or minimized  Outcome: Ongoing     Problem: Coping:  Goal: Level of anxiety will decrease  Description: Level of anxiety will decrease  Outcome: Ongoing  Goal: General experience of comfort will improve  Description: General experience of comfort will improve  Outcome: Ongoing     Problem: Health Behavior:  Goal: Ability to manage health-related needs will improve  Description: Ability to manage health-related needs will improve  Outcome: Ongoing     Problem: Safety:  Goal: Ability to remain free from injury will improve  Description: Ability to remain free from injury will improve  Outcome: Ongoing  Goal: Will show no signs and symptoms of excessive bleeding  Description: Will show no signs and symptoms of excessive bleeding  Outcome: Ongoing     Problem: Skin Integrity:  Goal: Will show no infection signs and symptoms  Description: Will show no infection signs and symptoms  Outcome: Ongoing  Goal: Absence of new skin breakdown  Description: Absence of new skin breakdown  Outcome: Ongoing     Problem: Falls - Risk of:  Goal: Will remain free from falls  Description: Will remain free from falls  Outcome: Ongoing  Goal: Absence of physical injury  Description: Absence of physical injury  Outcome: Ongoing   Pt has not complained of pain at this time. Pt able to tolerate baseline level of activity. Pt HR controlled with PO cardizem, however, remains in atrial fibrillation. Other VSS on room air. Pt not currently expressing feelings of anxiety and is comfortable at this time. Pt shows no signs of excessive bleeding and no signs of infection. Pt remains free from falls and physical injury at this time. See flowsheet for assessment.

## 2021-07-21 NOTE — DISCHARGE SUMMARY
Pt discharge instructions given to pt and daughter with verbal understanding and teach back. Education given on how to change BETTY dressing and general BETTY drain care. Pt and daughter present and understanding of education. Pt discharged with all pt belongings.

## 2021-07-21 NOTE — DISCHARGE INSTR - COC
Continuity of Care Form    Patient Name: Lupillo Varghese   :  1935  MRN:  [id number]    Admit date:  2021  Discharge date:  2021    Code Status Order: Full Code   Advance Directives:   Advance Care Flowsheet Documentation       Date/Time Healthcare Directive Type of Healthcare Directive Copy in [address] Agent's Name Healthcare Agent's Phone Number    21 1441  Yes, patient has an advance directive for healthcare treatment  --  Yes, copy in chart  --  --  --    21 1353  Yes, patient has an advance directive for healthcare treatment  Durable power of  for health care  Yes, copy in chart  --  --  --            Admitting Physician:  Danni Lim MD  PCP: Danni Lim MD    Discharging Nurse: Venancio Fragoso   [facility] [address] Unit/Room#: 5ZG-4485/0463-02  Discharging Unit Phone Number: [phone]    Emergency Contact:   Extended Emergency Contact Information  Primary Emergency Contact: GaffneyAshkan head  Address: [address]  Home Phone: [phone]  Work Phone: [phone]  Relation: Spouse  Secondary Emergency Contact: Antolin Monteiro  Mobile Phone: [phone]  Relation: Child    Past Surgical History:  Past Surgical History:   Procedure Laterality Date    CHOLECYSTECTOMY, LAPAROSCOPIC N/A 2021    LAPAROSCOPIC CHOLECYSTECTOMY WITH INTRAOPERATIVE CHOLANGIOGRAM performed by Navin Monteiro MD at [address] [place] [address]    CYSTOSCOPY      EXCISION OF PARATHYROID MASS  2017    EXCISION OF LEFT PARATHYROID ADENOMA WITH FROZEN SECTION    HEMORRHOID SURGERY      JOINT REPLACEMENT Right     KNEE    KIDNEY STONE SURGERY      SKIN CANCER EXCISION         Immunization History:   Immunization History   Administered Date(s) Administered    Influenza Vaccine, unspecified formulation 10/01/2012, 10/03/2013, 10/02/2014    Influenza Virus Vaccine 10/05/2015, 2016    Influenza Whole 11/01/2009    Pneumococcal Polysaccharide ([id number]) 10/01/1997       Active Problems:  Patient Active Problem List   Diagnosis Code    Parathyroid adenoma D35.1    Hyperparathyroidism ([facility] [address] E21.3    Multinodular goiter E04.2    Hypertensive disorder I10    OAB (overactive bladder) N32.81    TIA (transient ischemic attack) G45.9    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    History of cardiovascular disorder Z86.79    Anxiety F41.9    Gastroesophageal reflux disease without esophagitis K21.9    History of arthritis Z87.39    Hydronephrosis N13.30    Mixed hyperlipidemia E78.2    Primary osteoarthritis of right knee M17.11    Pure hypercholesterolemia E78.00    Senile osteoporosis M81.0    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    Urge incontinence N39.41    Dysphagia R13.10    Altered mental state R41.82    A-fib (HCC) I48.91    Acute pancreatitis K85.90    Coronary artery calcification seen on CT scan I25.10    UTI (urinary tract infection) N39.0       Isolation/Infection:   Isolation            No Isolation          Patient Infection Status       None to display            Nurse Assessment:  Last Vital Signs: /74   Pulse 83   Temp 98.3 °F (36.8 °C) (Oral)   Resp 16   Ht 5' 3\" (1.6 m)   Wt 125 lb (56.7 kg)   SpO2 93%   BMI 22.14 kg/m²     Last documented pain score (0-10 scale): Pain Level: 0  Last Weight:   Wt Readings from Last 1 Encounters:   07/21/21 125 lb (56.7 kg)     Mental Status:  oriented, alert, coherent and logical    IV Access:  - None    Nursing Mobility/ADLs:  Walking   Independent  Transfer  Assisted  Bathing  Independent  Dressing  Independent  Toileting  Independent  Feeding  Independent  Med Admin  Independent  Med Delivery   whole    Wound Care Documentation and Therapy:  Incision 05/11/17 Neck Anterior (Active)   Number of days: 1532        Elimination:  Continence:   · Bowel:  Yes  · Bladder: Yes  Urinary Catheter: None   Colostomy/Ileostomy/Ileal Conduit: No       Date of Last BM: n/a    Intake/Output Summary (Last 24 hours) at 7/21/2021 1200  Last data filed at 7/21/2021 0507  Gross per 24 hour   Intake 50 ml   Output 55 ml   Net -5 ml     I/O last 3 completed shifts: In: 48 [I.V.:50]  Out: 54 [Drains:55]    Safety Concerns: At Risk for Falls    Impairments/Disabilities:      None    Nutrition Therapy:  Current Nutrition Therapy:   - Oral Diet:  General    Routes of Feeding: Oral  Liquids: Thin Liquids  Daily Fluid Restriction: no  Last Modified Barium Swallow with Video (Video Swallowing Test): not done    Treatments at the Time of Hospital Discharge:   Respiratory Treatments: n/a  Oxygen Therapy:  is not on home oxygen therapy.   Ventilator:    - No ventilator support    Rehab Therapies: Physical Therapy and Occupational Therapy  Weight Bearing Status/Restrictions: No weight bearing restirctions  Other Medical Equipment (for information only, NOT a DME order):  n/a  Other Treatments: BETTY drain in place until 7/26    Patient's personal belongings (please select all that are sent with patient):  with pt    RN SIGNATURE:  Electronically signed by Andrey Loo RN on 7/21/21 at 12:13 PM EDT    CASE MANAGEMENT/SOCIAL WORK SECTION    Inpatient Status Date: ***    Readmission Risk Assessment Score:  Readmission Risk              Risk of Unplanned Readmission:  11           Discharging to Facility/ Agency   · Name:   · Address:  · Phone:  · Fax:    Dialysis Facility (if applicable)   · Name:  · Address:  · Dialysis Schedule:  · Phone:  · Fax:    / signature: {Esignature:[id number]:::0}    PHYSICIAN SECTION    Prognosis: Fair    Condition at Discharge: Stable    Rehab Potential (if transferring to Rehab): Good    Recommended Labs or Other Treatments After Discharge: ***    Physician Certification: I certify the above information and transfer of Carolyn De León  is necessary for the continuing treatment of the diagnosis listed and that she requires Home Care for greater 30 days.     Update Admission H&P: No change in H&P    PHYSICIAN SIGNATURE:  Electronically signed by Lana Layton MD on 7/21/21 at 12:00 PM EDT

## 2021-07-22 ENCOUNTER — TELEPHONE (OUTPATIENT)
Dept: SURGERY | Age: 86
End: 2021-07-22

## 2021-07-25 LAB — PTH RELATED PEPTIDE: <2 PMOL/L (ref 0–3.4)

## 2021-07-26 ENCOUNTER — OFFICE VISIT (OUTPATIENT)
Dept: SURGERY | Age: 86
End: 2021-07-26

## 2021-07-26 VITALS — BODY MASS INDEX: 21.61 KG/M2 | DIASTOLIC BLOOD PRESSURE: 72 MMHG | SYSTOLIC BLOOD PRESSURE: 127 MMHG | WEIGHT: 122 LBS

## 2021-07-26 DIAGNOSIS — K85.10 ACUTE BILIARY PANCREATITIS WITHOUT INFECTION OR NECROSIS: Primary | ICD-10-CM

## 2021-07-26 PROCEDURE — 99024 POSTOP FOLLOW-UP VISIT: CPT | Performed by: SURGERY

## 2021-07-26 NOTE — PROGRESS NOTES
Subjective:      Patient ID: Charley Brasher is a 80 y.o. female. HPI    Review of Systems    Objective:   Physical Exam  Abdomen soft  Incisions healing well  BETTY with minimal serous output  Assessment:      80-year-old female status post laparoscopic cholecystectomy for biliary pancreatitis. Pathology shows mild chronic cholecystitis with cholesterolosis. Intraoperative cholangiogram showed no abnormalities or gallstones within the biliary ductal system. She is doing well postoperatively. The BETTY drain was removed. Plan:      Follow-up as needed.         Rufino Iraheta MD

## 2021-08-18 PROBLEM — N39.0 UTI (URINARY TRACT INFECTION): Status: RESOLVED | Noted: 2021-07-19 | Resolved: 2021-08-18

## 2021-08-27 ENCOUNTER — OFFICE VISIT (OUTPATIENT)
Dept: CARDIOLOGY CLINIC | Age: 86
End: 2021-08-27
Payer: MEDICARE

## 2021-08-27 VITALS
DIASTOLIC BLOOD PRESSURE: 80 MMHG | WEIGHT: 123.8 LBS | HEART RATE: 97 BPM | SYSTOLIC BLOOD PRESSURE: 140 MMHG | BODY MASS INDEX: 21.93 KG/M2 | OXYGEN SATURATION: 98 % | HEIGHT: 63 IN

## 2021-08-27 DIAGNOSIS — I25.10 CORONARY ARTERY DISEASE WITHOUT ANGINA PECTORIS, UNSPECIFIED VESSEL OR LESION TYPE, UNSPECIFIED WHETHER NATIVE OR TRANSPLANTED HEART: ICD-10-CM

## 2021-08-27 DIAGNOSIS — I48.19 PERSISTENT ATRIAL FIBRILLATION (HCC): Primary | ICD-10-CM

## 2021-08-27 DIAGNOSIS — I10 ESSENTIAL HYPERTENSION: ICD-10-CM

## 2021-08-27 PROCEDURE — 1123F ACP DISCUSS/DSCN MKR DOCD: CPT | Performed by: NURSE PRACTITIONER

## 2021-08-27 PROCEDURE — 1036F TOBACCO NON-USER: CPT | Performed by: NURSE PRACTITIONER

## 2021-08-27 PROCEDURE — 1090F PRES/ABSN URINE INCON ASSESS: CPT | Performed by: NURSE PRACTITIONER

## 2021-08-27 PROCEDURE — 93000 ELECTROCARDIOGRAM COMPLETE: CPT | Performed by: NURSE PRACTITIONER

## 2021-08-27 PROCEDURE — 99214 OFFICE O/P EST MOD 30 MIN: CPT | Performed by: NURSE PRACTITIONER

## 2021-08-27 PROCEDURE — 4040F PNEUMOC VAC/ADMIN/RCVD: CPT | Performed by: NURSE PRACTITIONER

## 2021-08-27 PROCEDURE — G8420 CALC BMI NORM PARAMETERS: HCPCS | Performed by: NURSE PRACTITIONER

## 2021-08-27 PROCEDURE — G8427 DOCREV CUR MEDS BY ELIG CLIN: HCPCS | Performed by: NURSE PRACTITIONER

## 2021-08-27 NOTE — PATIENT INSTRUCTIONS
- Cardioversion  - Call office if symptoms develop  - Stress test  - Check your blood pressure at home, if your top number blood pressure is >140/90 or <90/50 please call the office   - Check your heart rate at home, if it is <50 or >110 please call the office     Patient Education      Electrical Cardioversion: Care Instructions  Your Care Instructions     Electrical cardioversion is a treatment for an abnormal heartbeat. For example, it may be used to treat atrial fibrillation. In cardioversion, a brief electric shock is given to the heart to reset its rhythm. The shock comes through patches that are put on the outside of your chest. Cardioversion most often restores the heartbeat to normal.  After the procedure, you may have redness where the patches were. (This may look like a sunburn.) Do not drive until the day after a cardioversion. You can eat and drink when you feel ready to. Your doctor may have you take medicines daily to help the heart beat in a normal way and to prevent blood clots. Your doctor may give you medicine before and after cardioversion. This is to help keep your heart in a normal rhythm after the procedure. Instead of electric cardioversion, your doctor may try to change your heartbeat to a normal rhythm by giving you medicine. This is most often done in a clinic or hospital.  You may have had a sedative to help you relax. You may be unsteady after having sedation. It can take a few hours for the medicine's effects to wear off. Common side effects of sedation include nausea, vomiting, and feeling sleepy or tired. The doctor has checked you carefully, but problems can develop later. If you notice any problems or new symptoms, get medical treatment right away. Follow-up care is a key part of your treatment and safety. Be sure to make and go to all appointments, and call your doctor if you are having problems.  It's also a good idea to know your test results and keep a list of the medicines you cannot talk while you are exercising. If you become short of breath or dizzy or have chest pain, sit down and rest immediately.     · Check your pulse regularly. Place two fingers on the artery at the palm side of your wrist in line with your thumb. If your heartbeat seems uneven or fast, talk to your doctor. When should you call for help? Call 911 anytime you think you may need emergency care. For example, call if:    · You have trouble breathing.     · You passed out (lost consciousness).     · You cough up pink, foamy mucus and you have trouble breathing.     · You have symptoms of a heart attack. These may include:  ? Chest pain or pressure, or a strange feeling in the chest.  ? Sweating. ? Shortness of breath. ? Nausea or vomiting. ? Pain, pressure, or a strange feeling in the back, neck, jaw, or upper belly or in one or both shoulders or arms. ? Lightheadedness or sudden weakness. ? A fast or irregular heartbeat. After you call 911, the  may tell you to chew 1 adult-strength or 2 to 4 low-dose aspirin. Wait for an ambulance. Do not try to drive yourself.     · You have symptoms of a stroke. These may include:  ? Sudden numbness, tingling, weakness, or loss of movement in your face, arm, or leg, especially on only one side of your body. ? Sudden vision changes. ? Sudden trouble speaking. ? Sudden confusion or trouble understanding simple statements. ? Sudden problems with walking or balance. ? A sudden, severe headache that is different from past headaches. Call your doctor now or seek immediate medical care if:    · You have new or worse nausea or vomiting.     · You have new or increased shortness of breath.     · You are dizzy or lightheaded, or you feel like you may faint.     · You have sudden weight gain, such as more than 2 to 3 pounds in a day or 5 pounds in a week.     · You have increased swelling in your legs, ankles, or feet.    Watch closely for changes in your health, and be sure to contact your doctor if you have any problems. Where can you learn more? Go to [email or web address] and sign in to your [employer] account. Enter N696 in the [facility] box to learn more about \"Electrical Cardioversion: Care Instructions. \"     If you do not have an account, please click on the \"Sign Up Now\" link. Current as of: August 31, 2020               Content Version: 12.9  © 2006-2021 [employer]. Care instructions adapted under license by [facility] [place] ([facility]). If you have questions about a medical condition or this instruction, always ask your healthcare professional. [address] any warranty or liability for your use of this information.

## 2021-08-27 NOTE — PROGRESS NOTES
[address]   Electrophysiology  SRINIVASA Ortega-CNP  Attending EP: Dr. La Cook  Date: 8/27/2021  I had the privilege of visiting Alba Warner in the office. Chief Complaint:   Chief Complaint   Patient presents with    Follow-Up from [facility] Atrial Fibrillation     History of Present Illness: History obtained from patient and medical record. Alba Warner is 80 y.o. female with a past medical history of hypertension, hyperlipidemia, TIA and CAD per CT scan and atrial fibrillation. Hospitalized 7/2021 with abd pain and emesis, treated for acute pancreatitis with lipase >3000. Found to be in atrial fibrillation RVR on admission and started on diltiazem gtt which was ne diagnosis. Started on Eliquis and BB. Interval history: Today, Alba Warner is being seen for AF and hypertension. Reports that she cannot tell she is in AF. Admits to some level of activity intolerance and SOB on heavy exertion, however she is not sure of the onset of these symptoms or if they are related to AF. Denies CP, worsening SOB, swelling, weight gain, or palpitations. She has recovered after her pancreatitis and feels improved. She would like to have procedures performed by Dr. Sheba Perez, her husbands cardiologist if possible. With regard to medication therapy the patient has been compliant with prescribed regimen. She has tolerated therapy to date. Assessment:  Persistent Atrial Fibrillation  - ECG today shows AF rate 95  - Continue diltiazem 120 mg daily and Metoprolol 100 mg daily  - Asymptomatic  - [id number] score: 10 (age, gender, HTN, TIA) ; [id number] Vasc score and anticoagulation discussed. High risk for stroke and thromboembolism. Anticoagulation is recommended.   ~ Continue Eliquis 2.5 mg bid (age, and wt), No reports of bleeding  - Afib risk factors including age, HTN, obesity, inactivity and MILOTN were discussed with patient.  Risk factor modification recommended              ~ TSH 1.3 (7/17/2021) - Treatment options including cardioversion, rate control strategy, antiarrhythmics, anticoagulation and possible ablation were discussed with patient. Risks, benefits and alternative of each treatment options were explained. All questions answered                          ~ Cardioversion and can consider ablation depending on recurrence  CAD per CT Scan                    - Stress testing has been recommended when pancreatitis resolved  HTN-goal <130/80   - Controlled   - Continue current medication   - Encouraged patient to check BP at home, log and bring to office visits  - Discussed lifestyle modifications, weight loss, low sodium diet  Plan  - Stress test, she is not sure if she wants to have this done and will decide after her cardioversion  - Cardioversion    F/U: Follow-up with EP in 3 months   -Follow up with device clinic as scheduled  -Call [facility] at [phone] with any questions    Allergies: Allergies   Allergen Reactions    Benzonatate Hives    Ciprofloxacin      Leg swelling    Clarithromycin Hives    Cortisone     Prednisone      Facial swelling and redness    Sulfa Antibiotics Rash, Itching and Hives     Home Medications:  Prior to Visit Medications    Medication Sig Taking?  Authorizing Provider   dilTIAZem (CARDIZEM CD) 120 MG extended release capsule Take 1 capsule by mouth daily  Brenda Gomes MD   metoprolol succinate (TOPROL XL) 100 MG extended release tablet Take 1 tablet by mouth daily  Brenda Gomes MD   apixaban (ELIQUIS) 2.5 MG TABS tablet Take 1 tablet by mouth 2 times daily  Brenda Gomes MD   escitalopram (LEXAPRO) 5 MG tablet Take 5 mg by mouth daily  Historical Provider, MD   fluticasone (FLONASE) 50 MCG/ACT nasal spray fluticasone propionate 50 mcg/actuation nasal spray,suspension  Historical Provider, MD   aspirin 81 MG EC tablet Take 1 tablet by mouth daily  Brenda Gomes MD   vitamin B-12 (CYANOCOBALAMIN) 1000 MCG tablet Take 1,000 mcg by mouth daily  Historical Provider, MD   solifenacin (VESICARE) 10 MG tablet Take 10 mg by mouth daily. Historical Provider, MD   Cholecalciferol (VITAMIN D) 1000 UNIT CAPS Take 1,000 Units by mouth daily. Historical Provider, MD   cetirizine (ZYRTEC) 10 MG tablet Take 10 mg by mouth daily. Historical Provider, MD      Past Medical History:  Past Medical History:   Diagnosis Date    Allergies     Elevated cholesterol     Hypertension     Reflux     TIA (transient ischemic attack)      Past Surgical History:    has a past surgical history that includes Kidney stone surgery; Colonoscopy; Skin cancer excision; joint replacement (Right); Cystoscopy; Hemorrhoid surgery; Excision of Parathyroid Mass (05/11/2017); and Cholecystectomy, laparoscopic (N/A, 7/20/2021). Social History:  Reviewed. reports that she has never smoked. She has never used smokeless tobacco. She reports that she does not drink alcohol and does not use drugs. Family History:  Reviewed. family history includes Cancer in her mother; Migraines in her brother, mother, and sister. Denies family history of sudden cardiac death, arrhythmia, premature CAD    Review of System:  · Constitutional: No weight changes or weakness  · HEENT: No visual changes. No mouth sores or sore throat. · Cardiovascular: denies chest pain, admits to dyspnea on exertion, denies palpitations or denies loss of consciousness. No cough, hemoptysis, denies pleuritic pain, or phlebitis. denies dizziness. · Respiratory: denies cough or wheezing. · Gastrointestinal: Negative, No blood in stools. · Genitourinary: No hematuria. · Neurological: No focal weakness  · Psychiatric: No confusion, anxiety, or depression. · Hem/Lymph: Denies abnormal bruising or bleeding. Physical Examination:  There were no vitals filed for this visit.    Wt Readings from Last 3 Encounters:   07/26/21 122 lb (55.3 kg)   07/21/21 125 lb (56.7 kg)   11/05/20 125 lb (56.7 kg)  Constitutional: Cooperative and in no apparent distress, and appears well nourished   Skin: Warm and pink; no cyanosis or bruising   HEENT: Symmetric and normocephalic. Conjunctiva pink with clear sclera. Mucus membranes pink and moist. No visible masses/goiter   Respiratory: Respirations symmetric and unlabored. Lungs clear to auscultation bilaterally, no wheezing, rhonchi, or crackles.  Cardiovascular:  irregular rate and rhythm. S1 & S2 present, negative for murmur. negative elevation of JVP. No peripheral edema.  Musculoskeletal:  No focal weakness.  Neurological/Psych: Awake and orientated to person, place and time. Calm affect, appropriate mood. Pertinent labs, diagnostic, device, and imaging results reviewed as a part of this visit    LABS    CBC:   Lab Results   Component Value Date    WBC 11.0 2021    HGB 12.9 2021    HCT 37.4 2021    MCV 89.4 2021     2021     BMP:   Lab Results   Component Value Date    CREATININE 0.6 2021    BUN 16 2021     2021    K 3.2 (L) 2021     2021    CO2 21 2021     CrCl cannot be calculated (Unknown ideal weight.). No results found for: BNP    Thyroid:   Lab Results   Component Value Date    TSH 1.02 02/15/2017     Lipid Panel:   Lab Results   Component Value Date    CHOL 186 2021    HDL 61 2021    TRIG 88 2021     LFTs:  Lab Results   Component Value Date    ALT 6 (L) 2021    AST 12 (L) 2021    ALKPHOS 76 2021    BILITOT 1.0 2021     Coags:   Lab Results   Component Value Date    PROTIME 13.1 (H) 2021    INR 1.15 (H) 2021    APTT 30.3 2021     EC2021 AF HR 95, QRS 86, QTc 425    EC2021: Atrial Fibrillation    ECHO:  2021  Summary   -Abnormal arrhythmia and rapid heart rate noted.    -Somewhat difficult study due to lung interference.   -Left ventricular cavity size is normal.   -Overall left ventricular systolic function appears hyperdynamic.   -Ejection fraction is visually estimated to be 70%.   -No definitive wall motion abnormalities are seen, although difficult to   determine due to abnormal arrhythmia.   -Indeterminate diastolic function.   -Left appears dilated. -Right atrium is dilated. -The aortic valve appears sclerotic but opens well. -Mild tricuspid regurgitation. RVSP = 34 mmHG. -IVC size is normal (<2.1 cm) but collapses < 50% with respiration   consistent with elevated RA pressure (8 mmHg). -Appears to be a trivial pericardial effusion. Stress Test: none    Cath: none      Diet & Exercise:   The patient is counseled to follow a low salt diet to assure blood pressure remains controlled for cardiovascular risk factor modification   The patient is counseled to avoid excess caffeine, and energy drinks as this may exacerbated ectopy and arrhythmia   The patient is counseled to lose weight to control cardiovascular risk factors   Exercise program discussed: To improve overall cardiovascular health, the patient is instructed to increase cardiovascular related activities with a goal of 150 min/week of moderate level activity or 10,000 steps per day. Encouraged to perform as much activity as tolerated     I have addressed the patient's cardiac risk factors and adjusted pharmacologic treatment as needed. In addition, I have reinforced the need for patient directed risk factor modification. I independently reviewed the ECG    All questions and concerns were addressed with the patient. Alternatives to treatment were discussed. Thank you for allowing to us to participate in the care of Alba WarnerSRINIVASA [facility]  [address]   Office: [phone]

## 2021-09-07 ENCOUNTER — TELEPHONE (OUTPATIENT)
Dept: CARDIOLOGY CLINIC | Age: 86
End: 2021-09-07

## 2021-09-07 ENCOUNTER — TELEPHONE (OUTPATIENT)
Dept: CARDIAC CATH/INVASIVE PROCEDURES | Age: 86
End: 2021-09-07

## 2021-09-07 NOTE — TELEPHONE ENCOUNTER
Pt calling for NPAL wanting to know if she is going to set pt up for the CV? Pt wanted to know if it can be done with [facility]? I told her I don't believe [facility] does CV but would ask.  Pls call to advise Thank you

## 2021-09-07 NOTE — LETTER
[facility]  [facility]    9/7/21  Otoniel Yates  1935  EP Procedures     Pacemaker implant (single/dual)  EP Study    ICD implant (single/dual)  Atrial flutter ablation (RANDALL Y/N)    Biv implant ICD  Tilt Table    Biv implant PPM  Atrial fibrillation ablation (RANDALL Yes)    Generator Change (PPM/ICD/BiV)  SVT ablation    Lead revision (RV/LA/RA) (<1 month)  VT ablation      Lead extraction +/- upgrade (BiV/PPM/ICD)  VT Ischemic/ non-ischemic    Loop implant/ removal  VT RVOT   XXX Cardioversion  VT Left sided    RANDALL  AVN ablation     Equipment     [employer]   CONSTANZA Mapping System    [employer]  Carto Mapping System    [employer]  CryoAblation    Biotronik  Laser Lead Extraction     EP Procedures Scheduling Request    # hours Requested   Scheduled  Date:   Specific Day  Completed    Anesthesia  F/u Date:   CT surgery backup  COVID     Overnight stay      Location [facility] - [facility] (Pt prefers if possible)       Pre-Procedure Labs / Imaging     PT/INR  Type & cross    CBC  Units PRBC    BMP/Mg  Units FFP    Venogram  Cardiac CTA for Pulmonary vein mapping     RN INITIALS:     Patient Instructions  Do not eat or drink after midnight the night prior to procedure  Dx:Atrial fibrillation

## 2021-09-07 NOTE — TELEPHONE ENCOUNTER
----- Message from Jennifer Coleman sent at 9/7/2021  1:14 PM EDT -----  Regarding: scheduling  Dr. Cyndi Levine does do CV's they are scheduled thru the office with Al Bolton or Catarina Palma.

## 2021-09-08 NOTE — TELEPHONE ENCOUNTER
Dr. Bob López does his own CV's these are scheduled thru the office schedulers Patient stated that he is in the donut hole with his insurance and is requesting samples of Eliquis, if possible. He stated that his wife, Sonja Cockayne, has an appointment with Dr. Tom Mendosa today and he would like for her to pick them up when she is here. Please advise.     Phone #: [phone]  Thanks

## 2021-09-10 ENCOUNTER — HOSPITAL ENCOUNTER (OUTPATIENT)
Dept: CARDIAC CATH/INVASIVE PROCEDURES | Age: 86
Discharge: HOME | End: 2021-09-10
Attending: INTERNAL MEDICINE | Admitting: INTERNAL MEDICINE
Payer: MEDICARE

## 2021-09-10 VITALS
BODY MASS INDEX: 21.26 KG/M2 | HEART RATE: 102 BPM | SYSTOLIC BLOOD PRESSURE: 169 MMHG | TEMPERATURE: 98.5 F | RESPIRATION RATE: 16 BRPM | HEIGHT: 63 IN | WEIGHT: 120 LBS | DIASTOLIC BLOOD PRESSURE: 99 MMHG

## 2021-09-10 LAB
EKG ATRIAL RATE: 98 BPM
EKG DIAGNOSIS: NORMAL
EKG Q-T INTERVAL: 370 MS
EKG QRS DURATION: 84 MS
EKG QTC CALCULATION (BAZETT): 452 MS
EKG R AXIS: 10 DEGREES
EKG T AXIS: 73 DEGREES
EKG VENTRICULAR RATE: 90 BPM

## 2021-09-10 PROCEDURE — 92960 CARDIOVERSION ELECTRIC EXT: CPT | Performed by: INTERNAL MEDICINE

## 2021-09-10 PROCEDURE — 99152 MOD SED SAME PHYS/QHP 5/>YRS: CPT | Performed by: INTERNAL MEDICINE

## 2021-09-10 PROCEDURE — 93005 ELECTROCARDIOGRAM TRACING: CPT | Performed by: INTERNAL MEDICINE

## 2021-09-10 PROCEDURE — 93010 ELECTROCARDIOGRAM REPORT: CPT | Performed by: INTERNAL MEDICINE

## 2021-09-10 PROCEDURE — 92960 CARDIOVERSION ELECTRIC EXT: CPT

## 2021-09-10 PROCEDURE — 7100000010 HC PHASE II RECOVERY - FIRST 15 MIN

## 2021-09-10 RX ORDER — SODIUM CHLORIDE 9 MG/ML
INJECTION, SOLUTION INTRAVENOUS CONTINUOUS
Status: DISCONTINUED | OUTPATIENT
Start: 2021-09-10 | End: 2021-09-10 | Stop reason: HOSPADM

## 2021-09-10 RX ORDER — SODIUM CHLORIDE 9 MG/ML
25 INJECTION, SOLUTION INTRAVENOUS PRN
Status: DISCONTINUED | OUTPATIENT
Start: 2021-09-10 | End: 2021-09-10 | Stop reason: HOSPADM

## 2021-09-10 RX ORDER — SODIUM CHLORIDE 0.9 % (FLUSH) 0.9 %
5-40 SYRINGE (ML) INJECTION PRN
Status: DISCONTINUED | OUTPATIENT
Start: 2021-09-10 | End: 2021-09-10 | Stop reason: HOSPADM

## 2021-09-10 RX ORDER — METOPROLOL SUCCINATE 100 MG/1
50 TABLET, EXTENDED RELEASE ORAL DAILY
Qty: 30 TABLET | Refills: 1 | Status: SHIPPED | OUTPATIENT
Start: 2021-09-10 | End: 2022-02-03 | Stop reason: SDUPTHER

## 2021-09-10 RX ORDER — SODIUM CHLORIDE 0.9 % (FLUSH) 0.9 %
5-40 SYRINGE (ML) INJECTION EVERY 12 HOURS SCHEDULED
Status: DISCONTINUED | OUTPATIENT
Start: 2021-09-10 | End: 2021-09-10 | Stop reason: HOSPADM

## 2021-09-10 RX ORDER — LISINOPRIL 5 MG/1
5 TABLET ORAL DAILY
Qty: 30 TABLET | Refills: 5 | Status: SHIPPED | OUTPATIENT
Start: 2021-09-10 | End: 2021-09-23

## 2021-09-10 NOTE — H&P
H&P Update    I have reviewed the history and physical from Dr. Cathryn Nowak and David Samson on 2021 and examined the patient and find no relevant changes. I have reviewed with the patient and/or family the risks, benefits, and alternatives to the procedure. Pre-sedation Assessment    Patient:  Caio Tovar   :   1935     Intended Procedure: BEAN      Kristian Pricila nurses notes reviewed and agreed. Medications reviewed  Allergies: Allergies   Allergen Reactions    Benzonatate Hives    Ciprofloxacin      Leg swelling    Clarithromycin Hives    Cortisone     Prednisone      Facial swelling and redness    Sulfa Antibiotics Rash, Itching and Hives         Pre-Procedure Assessment/Plan:  ASA 3 - Patient with moderate systemic disease with functional limitations    Level of Sedation Plan: Moderate sedation    Post Procedure plan: Return to same level of care    Shaji Macdonald MD  Cardiac Electrophysiology  [address]

## 2021-09-23 ENCOUNTER — HOSPITAL ENCOUNTER (OUTPATIENT)
Age: 86
Discharge: HOME | End: 2021-09-23
Payer: MEDICARE

## 2021-09-23 ENCOUNTER — OFFICE VISIT (OUTPATIENT)
Dept: CARDIOLOGY CLINIC | Age: 86
End: 2021-09-23
Payer: MEDICARE

## 2021-09-23 DIAGNOSIS — I48.19 PERSISTENT ATRIAL FIBRILLATION (HCC): Primary | ICD-10-CM

## 2021-09-23 DIAGNOSIS — I10 ESSENTIAL HYPERTENSION: ICD-10-CM

## 2021-09-23 DIAGNOSIS — I25.10 CORONARY ARTERY CALCIFICATION SEEN ON CT SCAN: ICD-10-CM

## 2021-09-23 DIAGNOSIS — G45.9 TIA (TRANSIENT ISCHEMIC ATTACK): ICD-10-CM

## 2021-09-23 DIAGNOSIS — I48.19 PERSISTENT ATRIAL FIBRILLATION (HCC): ICD-10-CM

## 2021-09-23 DIAGNOSIS — E78.2 MIXED HYPERLIPIDEMIA: ICD-10-CM

## 2021-09-23 LAB
ANION GAP SERPL CALCULATED.3IONS-SCNC: 13 MMOL/L (ref 3–16)
BUN BLDV-MCNC: 13 MG/DL (ref 7–20)
CALCIUM SERPL-MCNC: 10.1 MG/DL (ref 8.3–10.6)
CHLORIDE BLD-SCNC: 98 MMOL/L (ref 99–110)
CO2: 23 MMOL/L (ref 21–32)
CREAT SERPL-MCNC: 0.8 MG/DL (ref 0.6–1.2)
GFR AFRICAN AMERICAN: >60
GFR NON-AFRICAN AMERICAN: >60
GLUCOSE BLD-MCNC: 107 MG/DL (ref 70–99)
POTASSIUM SERPL-SCNC: 4.9 MMOL/L (ref 3.5–5.1)
SODIUM BLD-SCNC: 134 MMOL/L (ref 136–145)

## 2021-09-23 PROCEDURE — 4040F PNEUMOC VAC/ADMIN/RCVD: CPT | Performed by: NURSE PRACTITIONER

## 2021-09-23 PROCEDURE — 1123F ACP DISCUSS/DSCN MKR DOCD: CPT | Performed by: NURSE PRACTITIONER

## 2021-09-23 PROCEDURE — 99214 OFFICE O/P EST MOD 30 MIN: CPT | Performed by: NURSE PRACTITIONER

## 2021-09-23 PROCEDURE — G8420 CALC BMI NORM PARAMETERS: HCPCS | Performed by: NURSE PRACTITIONER

## 2021-09-23 PROCEDURE — 93000 ELECTROCARDIOGRAM COMPLETE: CPT | Performed by: NURSE PRACTITIONER

## 2021-09-23 PROCEDURE — 1090F PRES/ABSN URINE INCON ASSESS: CPT | Performed by: NURSE PRACTITIONER

## 2021-09-23 PROCEDURE — 1036F TOBACCO NON-USER: CPT | Performed by: NURSE PRACTITIONER

## 2021-09-23 PROCEDURE — 36415 COLL VENOUS BLD VENIPUNCTURE: CPT

## 2021-09-23 PROCEDURE — G8427 DOCREV CUR MEDS BY ELIG CLIN: HCPCS | Performed by: NURSE PRACTITIONER

## 2021-09-23 PROCEDURE — 80048 BASIC METABOLIC PNL TOTAL CA: CPT

## 2021-09-23 RX ORDER — LISINOPRIL 5 MG/1
5 TABLET ORAL 2 TIMES DAILY
Qty: 60 TABLET | Refills: 2 | Status: SHIPPED | OUTPATIENT
Start: 2021-09-23 | End: 2022-02-03 | Stop reason: SDUPTHER

## 2021-09-23 NOTE — PATIENT INSTRUCTIONS
Increase lisinopril to 5 mg twice a day    Check blood work today    Monitor tremors    Call me if you are feeling too fatigued.

## 2021-09-23 NOTE — PROGRESS NOTES
[address]     Outpatient Follow Up Note    CHIEF COMPLAINT / HPI: Hospital Follow Up secondary to atrial fibrillation    Hospital record has been reviewed  Hospital Course progressed as follows per discharge summary:     9/10/21  Hospital course  Patient came in for scheduled DCCV and discharged same day after successful procedure. Senia Yarbrough is 80 y.o. female who presents today for a routine follow up after a recent hospitalization related to the above mentioned issues. Subjective:   Since the time of discharge, the patient states she has been feeling well. She noticed BP start rising after DCCV and climbed up to 058C-284I systolic. It was as high as 220/100s. Her daughter has been dosing her with an extra half tablet of lisinopril or even a full tablet in the evenings when this happened. Lauren Fishman has been prescribed lexapro by her PCP but hadn't been taking it. Yesterday was her first day taking the medication and she noticed a lower BP through the day. She noticed it spiked again in the evening to 644K systolic. With regard to medication therapy the patient has been compliant with prescribed regimen. They have tolerated therapy to date.      Past Medical History:   Diagnosis Date    Allergies     Elevated cholesterol     Hypertension     Reflux     TIA (transient ischemic attack)      Social History:    Social History     Tobacco Use   Smoking Status Never Smoker   Smokeless Tobacco Never Used     Current Medications:  Current Outpatient Medications   Medication Sig Dispense Refill    metoprolol succinate (TOPROL XL) 100 MG extended release tablet Take 0.5 tablets by mouth daily 30 tablet 1    lisinopril (PRINIVIL;ZESTRIL) 5 MG tablet Take 1 tablet by mouth daily 30 tablet 5    dilTIAZem (CARDIZEM CD) 120 MG extended release capsule Take 1 capsule by mouth daily 30 capsule 1    apixaban (ELIQUIS) 2.5 MG TABS tablet Take 1 tablet by mouth 2 times daily 60 tablet 1    escitalopram (LEXAPRO) 5 MG tablet Take 5 mg by mouth daily      fluticasone (FLONASE) 50 MCG/ACT nasal spray fluticasone propionate 50 mcg/actuation nasal spray,suspension      aspirin 81 MG EC tablet Take 1 tablet by mouth daily 30 tablet 3    vitamin B-12 (CYANOCOBALAMIN) 1000 MCG tablet Take 1,000 mcg by mouth daily      solifenacin (VESICARE) 10 MG tablet Take 10 mg by mouth daily.  Cholecalciferol (VITAMIN D) 1000 UNIT CAPS Take 1,000 Units by mouth daily.  cetirizine (ZYRTEC) 10 MG tablet Take 10 mg by mouth daily. No current facility-administered medications for this visit. REVIEW OF SYSTEMS:   CONSTITUTIONAL: No major weight gain or loss, fatigue, weakness, night sweats or fever. There's been no change in energy level, sleep pattern, or activity level. HEENT: No new vision difficulties or ringing in the ears. RESPIRATORY: No new SOB, PND, orthopnea or cough. CARDIOVASCULAR: See HPI  GI: No nausea, vomiting, diarrhea, constipation, abdominal pain or changes in bowel habits. : No urinary frequency, urgency, incontinence hematuria or dysuria. SKIN: No cyanosis or skin lesions. MUSCULOSKELETAL: No new muscle or joint pain. NEUROLOGICAL: No syncope or TIA-like symptoms. PSYCHIATRIC: No anxiety, pain, insomnia or depression    Objective:   PHYSICAL EXAM:      Vitals:    09/23/21 1412 09/24/21 0943   BP: (!) 154/72 136/70   Site: Left Upper Arm    Position: Sitting Sitting   Cuff Size: Medium Adult    Pulse: 52 58   SpO2: 97%    Weight: 121 lb (54.9 kg)    Height: 5' 3\" (1.6 m)          VITALS:  /70 (Position: Sitting)   Pulse 58   Ht 5' 3\" (1.6 m)   Wt 121 lb (54.9 kg)   SpO2 97%   BMI 21.43 kg/m²     CONSTITUTIONAL: Cooperative, no apparent distress, and appears well nourished / developed  NEUROLOGIC:  Awake and orientated to person, place and time. PSYCH: Calm affect. SKIN: Warm and dry.   HEENT: Sclera non-icteric, normocephalic, neck supple, no elevation of JVP, normal carotid pulses with no bruits and thyroid normal size. LUNGS:  No increased work of breathing and clear to auscultation, no crackles or wheezing. CARDIOVASCULAR:  Regular rate and rhythm with no murmurs, gallops, rubs, or abnormal heart sounds, normal PMI. The apical impulses not displaced. Heart tones are crisp and normal                                                                                        ABDOMEN:  Normal bowel sounds, non-distended and non-tender to palpation  EXT: No edema, no calf tenderness. Pulses are present bilaterally. DATA:    Lab Results   Component Value Date    ALT 6 (L) 07/19/2021    AST 12 (L) 07/19/2021    ALKPHOS 76 07/19/2021    BILITOT 1.0 07/19/2021     Lab Results   Component Value Date    CREATININE 0.6 07/20/2021    BUN 16 07/20/2021     07/20/2021    K 3.2 (L) 07/20/2021     07/20/2021    CO2 21 07/20/2021     Lab Results   Component Value Date    TSH 1.02 02/15/2017     Lab Results   Component Value Date    WBC 11.0 07/20/2021    HGB 12.9 07/20/2021    HCT 37.4 07/20/2021    MCV 89.4 07/20/2021     07/20/2021     No components found for: CHLPL  Lab Results   Component Value Date    TRIG 88 07/17/2021    TRIG 70 08/26/2020    TRIG 80 06/18/2020     Lab Results   Component Value Date    HDL 61 (H) 07/17/2021    HDL 57 08/26/2020    HDL 53 06/18/2020     Lab Results   Component Value Date    LDLCALC 107 (H) 07/17/2021    LDLCALC 103 (H) 08/26/2020    LDLCALC 100 (H) 06/18/2020     Lab Results   Component Value Date    LABVLDL 18 07/17/2021    LABVLDL 14 08/26/2020    LABVLDL 16 06/18/2020     Radiology Review:  Pertinent images / reports were reviewed as a part of this visit and reveals the following:    Last Echo 7/19/21:  Summary   -Abnormal arrhythmia and rapid heart rate noted.    -Somewhat difficult study due to lung interference.   -Left ventricular cavity size is normal.   -Overall left ventricular systolic function appears hyperdynamic.   -Ejection fraction is visually estimated to be 70%.   -No definitive wall motion abnormalities are seen, although difficult to   determine due to abnormal arrhythmia.   -Indeterminate diastolic function.   -Left appears dilated. -Right atrium is dilated. -The aortic valve appears sclerotic but opens well. -Mild tricuspid regurgitation. RVSP = 34 mmHG. -IVC size is normal (<2.1 cm) but collapses < 50% with respiration   consistent with elevated RA pressure (8 mmHg). -Appears to be a trivial pericardial effusion. CT abdomen/pelvis 7/17/21:  Cardiomegaly with scattered coronary vascular calcification     Last ECG 9/11/21:  Sinus  Bradycardia  - occasional PAC     # PACs = 1.  -Inferior ST-elevation -repolarization variant. [id number] Score for Atrial Fibrillation Stroke Risk   Risk   Factors  Component Value   C CHF No 0   H HTN Yes 1   A2 Age >= 76 Yes,  (80 y.o.) 2   D DM No 0   S2 Prior Stroke/TIA Yes 2   V Vascular Disease No 0   A Age 74-69 No,  (80 y.o.) 0   Sc Sex female 1    [id number]  Score  6   Score last updated 9/24/21 75:37 PM EDT    Click here for a link to the UpToDate guideline \"Atrial Fibrillation: Anticoagulation therapy to prevent embolization    Disclaimer: Risk Score calculation is dependent on accuracy of patient problem list and past encounter diagnosis. Assessment:      Diagnosis Orders   1. Persistent atrial fibrillation ([address]   ~ s/p DCCV 9/10/21  ~ EKG today SB with occational PAC  ~ [id number] score 6  ~ eliquis 2.5 / diltiazem 120 / Toprol 50 EKG 12 lead/BMP   2. Essential hypertension   ~ BP rising after DCCV, got as high as 210/100s  ~ improved the past two days when patient took lexapro, but still high in the evenings  ~ lisinopril 5 / Toprol 50 / diltiazem 120    3. Mixed hyperlipidemia   ~ lipids 7/17/2021  HDL 61  Trig 88    4.  Coronary artery calcification seen on CT scan   ~ CT abd/pelvis 7/2021: Cardiomegaly with scattered coronary vascular calcification   ~ willing to do stress test     5. TIA (transient ischemic attack)   ~ follows with Lisa Millerion       Patient  is stable since hospital discharge. Plan:   1. OK to take 5 mg lisinopril BID. Continue checking BP for the next week and call me in a week with readings. 2. Check blood work today  3. Schedule stress test   4. Appt scheduled with Domingo Landa in November. Follow up in me in 3 months     I have addressed the patient's cardiac risk factors and adjusted pharmacologic treatment as needed. In addition, I have reinforced the need for patient directed risk factor modification. Further evaluation will be based upon the patient's clinical course and testing results. All questions and concerns were addressed to the patient/family (daughter)    The patient currently is not smoking. The risks related to smoking were reviewed with the patient. Recommend maintaining a smoke-free lifestyle. Products available for smoking cessation were discussed. Dual Antiplatelet therapy / anti-coagulation has been recommended / prescribed for this patient. Education conducted on adverse reactions including bleeding was discussed. The patient verbalizes understanding. Pt is on a BB  Pt is on an ace-i/ARB  Pt is not on a statin; no hx of HLD      Saturated fat diet discussed  Exercise program discussed    Thank you for allowing to us to participate in the care of Carolynmarta De León.       [facility], [place]  Documentation of today's visit sent to PCP

## 2021-09-24 ENCOUNTER — TELEPHONE (OUTPATIENT)
Dept: CARDIOLOGY CLINIC | Age: 86
End: 2021-09-24

## 2021-09-24 VITALS
HEART RATE: 58 BPM | BODY MASS INDEX: 21.44 KG/M2 | WEIGHT: 121 LBS | HEIGHT: 63 IN | OXYGEN SATURATION: 97 % | DIASTOLIC BLOOD PRESSURE: 70 MMHG | SYSTOLIC BLOOD PRESSURE: 136 MMHG

## 2021-09-24 NOTE — TELEPHONE ENCOUNTER
Patient returning call. Notified patient of KA message/results below. Patient verbalized understanding and has no questions at this time.

## 2021-09-24 NOTE — PROCEDURES
[address]     Electrophysiology Procedure Note       Date of Procedure: 9/24/2021  Patient's Name: Darci Heredia  YOB: 1935   Medical Record Number: [id number]  Procedure Performed by: Teena Cristina MD    Procedures performed:  IV sedation. External Electrical cardioversion   Mallampati 3  ASA 2    Indication of the procedure: Persistent Atrial fibrillation    Details of procedure: The patient was brought to the cath lab area in a fasting and non-sedated state. The risks, benefits and alternatives of the procedure were discussed with the patient. The patient opted to proceed with the procedure. Written informed consent was signed and placed in the chart. A timeout protocol was completed to identify the patient and the procedure being performed. Patient is on chronic anticoagulation therapy. An independent trained observer pushed medications  at my direction. We monitored the patient's level of consciousness and vital signs/physiologic status throughout the procedure duration (see start and stop times below). Sedation:  30 mg of brevital   Sedation start: 2:05pm  Sedation stop: 2:10 pm     DC cardioversion was perfomred using 200J, synchronized shock. Patient was converted to sinus rhythm at 60s bpm. The patient tolerated the procedure well and there were no complications. Conclusion:   Successful external DC cardioversion of persistent Atrial fibrillation    Plan:   The patient can be discharged if remains stable. Will continue with medical therapy.        Teena Cristina MD  Cardiac Electrophysiology  [place] [address]

## 2021-09-24 NOTE — TELEPHONE ENCOUNTER
----- Message from SRINIVASA Langston CNP sent at 9/24/2021  8:12 AM EDT -----  Kidney function and electrolytes look good. Sodium slightly low at 134, but it appears she runs around here.

## 2021-11-22 ENCOUNTER — HOSPITAL ENCOUNTER (OUTPATIENT)
Dept: NON INVASIVE DIAGNOSTICS | Age: 86
Discharge: HOME | End: 2021-11-22
Payer: MEDICARE

## 2021-11-22 DIAGNOSIS — I25.10 CORONARY ARTERY CALCIFICATION SEEN ON CT SCAN: ICD-10-CM

## 2021-11-22 LAB
LV EF: 52 %
LVEF MODALITY: NORMAL

## 2021-11-22 PROCEDURE — 3430000000 HC RX DIAGNOSTIC RADIOPHARMACEUTICAL: Performed by: NURSE PRACTITIONER

## 2021-11-22 PROCEDURE — 93017 CV STRESS TEST TRACING ONLY: CPT | Performed by: INTERNAL MEDICINE

## 2021-11-22 PROCEDURE — 6360000002 HC RX W HCPCS: Performed by: INTERNAL MEDICINE

## 2021-11-22 PROCEDURE — 6360000002 HC RX W HCPCS: Performed by: NURSE PRACTITIONER

## 2021-11-22 PROCEDURE — A9502 TC99M TETROFOSMIN: HCPCS | Performed by: NURSE PRACTITIONER

## 2021-11-22 PROCEDURE — 78452 HT MUSCLE IMAGE SPECT MULT: CPT | Performed by: INTERNAL MEDICINE

## 2021-11-22 RX ORDER — AMINOPHYLLINE DIHYDRATE 25 MG/ML
100 INJECTION, SOLUTION INTRAVENOUS ONCE
Status: COMPLETED | OUTPATIENT
Start: 2021-11-22 | End: 2021-11-22

## 2021-11-22 RX ADMIN — TETROFOSMIN 10 MILLICURIE: 1.38 INJECTION, POWDER, LYOPHILIZED, FOR SOLUTION INTRAVENOUS at 12:55

## 2021-11-22 RX ADMIN — REGADENOSON 0.4 MG: 0.08 INJECTION, SOLUTION INTRAVENOUS at 13:57

## 2021-11-22 RX ADMIN — TETROFOSMIN 30 MILLICURIE: 1.38 INJECTION, POWDER, LYOPHILIZED, FOR SOLUTION INTRAVENOUS at 13:57

## 2021-11-22 RX ADMIN — AMINOPHYLLINE 100 MG: 25 INJECTION, SOLUTION INTRAVENOUS at 13:59

## 2021-11-22 NOTE — PROGRESS NOTES
Instructed on Lexiscan Stress Test Procedure including possible side effects/ adverse reactions. Patient verbalizes  understanding and denies having any questions . See [address] [place] [address] Rd Cardiology. Aminophylline given per lexiscan protocol in stress lab for c/o persistant nausea.

## 2021-12-03 ENCOUNTER — OFFICE VISIT (OUTPATIENT)
Dept: CARDIOLOGY CLINIC | Age: 86
End: 2021-12-03
Payer: MEDICARE

## 2021-12-03 VITALS
OXYGEN SATURATION: 96 % | HEIGHT: 63 IN | WEIGHT: 125 LBS | SYSTOLIC BLOOD PRESSURE: 130 MMHG | DIASTOLIC BLOOD PRESSURE: 80 MMHG | BODY MASS INDEX: 22.15 KG/M2 | HEART RATE: 85 BPM

## 2021-12-03 DIAGNOSIS — I48.19 PERSISTENT ATRIAL FIBRILLATION (HCC): Primary | ICD-10-CM

## 2021-12-03 PROCEDURE — G8484 FLU IMMUNIZE NO ADMIN: HCPCS | Performed by: NURSE PRACTITIONER

## 2021-12-03 PROCEDURE — 1123F ACP DISCUSS/DSCN MKR DOCD: CPT | Performed by: NURSE PRACTITIONER

## 2021-12-03 PROCEDURE — G8427 DOCREV CUR MEDS BY ELIG CLIN: HCPCS | Performed by: NURSE PRACTITIONER

## 2021-12-03 PROCEDURE — 4040F PNEUMOC VAC/ADMIN/RCVD: CPT | Performed by: NURSE PRACTITIONER

## 2021-12-03 PROCEDURE — 1036F TOBACCO NON-USER: CPT | Performed by: NURSE PRACTITIONER

## 2021-12-03 PROCEDURE — G8420 CALC BMI NORM PARAMETERS: HCPCS | Performed by: NURSE PRACTITIONER

## 2021-12-03 PROCEDURE — 1090F PRES/ABSN URINE INCON ASSESS: CPT | Performed by: NURSE PRACTITIONER

## 2021-12-03 PROCEDURE — 99214 OFFICE O/P EST MOD 30 MIN: CPT | Performed by: NURSE PRACTITIONER

## 2021-12-03 PROCEDURE — 93000 ELECTROCARDIOGRAM COMPLETE: CPT | Performed by: NURSE PRACTITIONER

## 2021-12-03 NOTE — PROGRESS NOTES
[facility]   Electrophysiology  Cindia Cleve, APRN-CNP  Attending EP: Dr. Dajuan Hussein  Date: 12/3/2021  I had the privilege of visiting Sarah Beth Jefferson in the office. Chief Complaint:   Chief Complaint   Patient presents with    3 Month Follow-Up    Atrial Fibrillation     History of Present Illness: History obtained from patient and medical record. Sarah Beth Jefferson is 80 y.o. female with a past medical history of hypertension, hyperlipidemia, TIA and CAD per CT scan and atrial fibrillation. Hospitalized 7/2021 with abd pain and emesis, treated for acute pancreatitis with lipase >3000. Found to be in atrial fibrillation RVR on admission and started on diltiazem gtt which was ne diagnosis. Started on Eliquis and BB. Interval history: Today, Sarah Beth Jefferson is being seen for Persistent atrial fibrillation, and hypertension. S/p DCCV 9/10/2021 and she was in [address] [place] [address] on ECG at her follow up on 9/23. Some time since then she developed recurrent AF. This time her rate is controlled. Denies any s/s that she is in atrial fibrillation. Reports that she not felt any differently in the last several weeks. Denies palpitations, fatigue, SOB, CP, or activity intolerance. In fact, she has been feeling great and very active. No acute complaints at today's visit. With regard to medication therapy the patient has been compliant with prescribed regimen. She has tolerated therapy to date. Assessment:  Persistent Atrial Fibrillation  - ECG today shows atrial fibrillation CVR 74  - Continue diltiazem 120 mg daily and Metoprolol 100 mg daily  - Asymptomatic  - [id number] score: 10 (age, gender, HTN, TIA) ; [id number] Vasc score and anticoagulation discussed. High risk for stroke and thromboembolism. Anticoagulation is recommended.   ~ Continue Eliquis 2.5 mg bid (age, and wt), no s/s of bleeding  - Afib risk factors including age, HTN, obesity, inactivity and MILTON were discussed with patient.  Risk factor modification recommended              ~ TSH 1.3 (7/17/2021)               - Treatment options including cardioversion, rate control strategy, antiarrhythmics, anticoagulation and possible ablation were discussed with patient. Risks, benefits and alternative of each treatment options were explained. All questions answered                          ~ S/p DCCV 9/10/2021    ~ She did not feel better after her cardioversion. Discussed options of AAD therapy with class 1C and DCCV, cardiac ablation, or continuation of rate control in detail with patient and her . She would like to continue with rate control. She is not interested in medication or invasive procedures, her rates are controlled and she has no symptom in AF. She will call if she changes her mind. CAD per CT Scan                    - Stress testing has been recommended when pancreatitis resolved  HTN-goal <130/80   - Controlled   - Continue current medication   - Encouraged patient to check BP at home, log and bring to office visits  - Discussed lifestyle modifications, weight loss, low sodium diet  Plan  - No medication changes today  - She will call if she decides to proceed with rhythm management, for now we will continue with rate control and AC  - She will call if HR >110     F/U: Follow-up with EP in 3-4 months  Call [facility] at [phone] with any questions    Allergies: Allergies   Allergen Reactions    Benzonatate Hives    Ciprofloxacin      Leg swelling    Clarithromycin Hives    Cortisone     Prednisone      Facial swelling and redness    Sulfa Antibiotics Rash, Itching and Hives     Home Medications:  Prior to Visit Medications    Medication Sig Taking?  Authorizing Provider   lisinopril (PRINIVIL;ZESTRIL) 5 MG tablet Take 1 tablet by mouth 2 times daily Yes SRINIVASA Pickett - MARK   metoprolol succinate (TOPROL XL) 100 MG extended release tablet Take 0.5 tablets by mouth daily Yes Kevin Wallace MD   dilTIAZem (CARDIZEM CD) 120 MG extended release capsule Take 1 capsule by mouth daily Yes Jose Rangel MD   apixaban (ELIQUIS) 2.5 MG TABS tablet Take 1 tablet by mouth 2 times daily Yes Jose Rangel MD   escitalopram (LEXAPRO) 5 MG tablet Take 5 mg by mouth daily Yes Historical Provider, MD   fluticasone (FLONASE) 50 MCG/ACT nasal spray fluticasone propionate 50 mcg/actuation nasal spray,suspension Yes Historical Provider, MD   aspirin 81 MG EC tablet Take 1 tablet by mouth daily  Patient taking differently: Take 81 mg by mouth daily Every other day Yes Jose Rangel MD   vitamin B-12 (CYANOCOBALAMIN) 1000 MCG tablet Take 1,000 mcg by mouth daily Yes Historical Provider, MD   solifenacin (VESICARE) 10 MG tablet Take 10 mg by mouth daily. Yes Historical Provider, MD   Cholecalciferol (VITAMIN D) 1000 UNIT CAPS Take 1,000 Units by mouth daily. Yes Historical Provider, MD   cetirizine (ZYRTEC) 10 MG tablet Take 10 mg by mouth daily. Yes Historical Provider, MD      Past Medical History:  Past Medical History:   Diagnosis Date    Allergies     Elevated cholesterol     Hypertension     Reflux     TIA (transient ischemic attack)      Past Surgical History:    has a past surgical history that includes Kidney stone surgery; Colonoscopy; Skin cancer excision; joint replacement (Right); Cystoscopy; Hemorrhoid surgery; Excision of Parathyroid Mass (05/11/2017); and Cholecystectomy, laparoscopic (N/A, 7/20/2021). Social History:  Reviewed. reports that she has never smoked. She has never used smokeless tobacco. She reports that she does not drink alcohol and does not use drugs. Family History:  Reviewed. family history includes Cancer in her mother; Migraines in her brother, mother, and sister. Denies family history of sudden cardiac death, arrhythmia, premature CAD    Review of System:  · Constitutional: No weight changes or weakness  · HEENT: No visual changes. No mouth sores or sore throat.   · Cardiovascular: denies chest pain, denies dyspnea on exertion, denies palpitations or denies loss of consciousness. No cough, hemoptysis, denies pleuritic pain, or phlebitis. denies dizziness. · Respiratory: denies cough or wheezing. · Gastrointestinal: Negative, No blood in stools. · Genitourinary: No hematuria. · Neurological: No focal weakness  · Psychiatric: No confusion, anxiety, or depression. · Hem/Lymph: Denies abnormal bruising or bleeding. Physical Examination:  Vitals:    12/03/21 1434   BP: 130/80   Pulse: 85   SpO2: 96%      Wt Readings from Last 3 Encounters:   12/03/21 125 lb (56.7 kg)   09/23/21 121 lb (54.9 kg)   09/10/21 120 lb (54.4 kg)      Constitutional: Cooperative and in no apparent distress, and appears well nourished   Skin: Warm and pink; no cyanosis or bruising   HEENT: Symmetric and normocephalic. Conjunctiva pink with clear sclera. Mucus membranes pink and moist. No visible masses/goiter   Respiratory: Respirations symmetric and unlabored. Lungs clear to auscultation bilaterally, no wheezing, rhonchi, or crackles.  Cardiovascular:  irregular rate and rhythm. S1 & S2 present, negative for murmur. negative elevation of JVP. No peripheral edema.  Musculoskeletal:  No focal weakness.  Neurological/Psych: Awake and orientated to person, place and time. Calm affect, appropriate mood. Pertinent labs, diagnostic, device, and imaging results reviewed as a part of this visit    LABS    CBC:   Lab Results   Component Value Date    WBC 11.0 07/20/2021    HGB 12.9 07/20/2021    HCT 37.4 07/20/2021    MCV 89.4 07/20/2021     07/20/2021     BMP:   Lab Results   Component Value Date    CREATININE 0.8 09/23/2021    BUN 13 09/23/2021     (L) 09/23/2021    K 4.9 09/23/2021    CL 98 (L) 09/23/2021    CO2 23 09/23/2021     Estimated Creatinine Clearance: 42 mL/min (based on SCr of 0.8 mg/dL).    No results found for: BNP    Thyroid:   Lab Results   Component Value Date    TSH 1.02 02/15/2017 Lipid Panel:   Lab Results   Component Value Date    CHOL 186 2021    HDL 61 2021    TRIG 88 2021     LFTs:  Lab Results   Component Value Date    ALT 6 (L) 2021    AST 12 (L) 2021    ALKPHOS 76 2021    BILITOT 1.0 2021     Coags:   Lab Results   Component Value Date    PROTIME 13.1 (H) 2021    INR 1.15 (H) 2021    APTT 30.3 2021     EC/3/2021 atrial fibrillation  HR 74, QRS 90, QTc 428     EC2021: Atrial Fibrillation    ECHO:  2021  Summary   -Abnormal arrhythmia and rapid heart rate noted. -Somewhat difficult study due to lung interference.   -Left ventricular cavity size is normal.   -Overall left ventricular systolic function appears hyperdynamic.   -Ejection fraction is visually estimated to be 70%.   -No definitive wall motion abnormalities are seen, although difficult to   determine due to abnormal arrhythmia.   -Indeterminate diastolic function.   -Left appears dilated. -Right atrium is dilated. -The aortic valve appears sclerotic but opens well. -Mild tricuspid regurgitation. RVSP = 34 mmHG. -IVC size is normal (<2.1 cm) but collapses < 50% with respiration   consistent with elevated RA pressure (8 mmHg). -Appears to be a trivial pericardial effusion. Stress Test: 2021  Summary    The overall quality of the study is good.        Left ventricular cavity size is normal. Right ventricle is normal in size.        SPECT images demonstrate homogeneous tracer distribution throughout the    myocardium. There is normal isotope uptake at stress and rest. There is no    evidence of myocardial ischemia or scar.        Sum stress score of 0. No visual TID. Calculated TID is 0.90.        Left ventricular ejection fraction of 52 %.      Myocardial perfusion is normal. Low risk scan. Sensitivity of testing    reduced on betablocker/calcium channel blocker. Patient in atrial flutter.      Cath: none    Diet & Exercise:   The patient is counseled to follow a low salt diet to assure blood pressure remains controlled for cardiovascular risk factor modification   The patient is counseled to avoid excess caffeine, and energy drinks as this may exacerbated ectopy and arrhythmia   The patient is counseled to lose weight to control cardiovascular risk factors   Exercise program discussed: To improve overall cardiovascular health, the patient is instructed to increase cardiovascular related activities with a goal of 150 min/week of moderate level activity or 10,000 steps per day. Encouraged to perform as much activity as tolerated     I have addressed the patient's cardiac risk factors and adjusted pharmacologic treatment as needed. In addition, I have reinforced the need for patient directed risk factor modification. I independently reviewed the ECG    All questions and concerns were addressed with the patient. Alternatives to treatment were discussed. Thank you for allowing to us to participate in the care of ANNABEL Conroy  [facility]   Office: [phone]

## 2021-12-03 NOTE — PATIENT INSTRUCTIONS
- No medication changes  - Call office if you develop symptoms  - Call office If you decide to proceed with ablation or medications  - Check your blood pressure at home, if your top number blood pressure is >140/90 or <95/50 please call the office  - Check your heart rate at home, if it is <50 or >110 please call the office   - Follow up in 3-4 months

## 2022-01-24 ENCOUNTER — OFFICE VISIT (OUTPATIENT)
Dept: PRIMARY CARE CLINIC | Age: 87
End: 2022-01-24
Payer: MEDICARE

## 2022-01-24 VITALS
OXYGEN SATURATION: 99 % | WEIGHT: 123.8 LBS | HEART RATE: 84 BPM | HEIGHT: 61 IN | TEMPERATURE: 98 F | BODY MASS INDEX: 23.37 KG/M2 | DIASTOLIC BLOOD PRESSURE: 80 MMHG | SYSTOLIC BLOOD PRESSURE: 130 MMHG

## 2022-01-24 DIAGNOSIS — I10 ESSENTIAL HYPERTENSION, BENIGN: ICD-10-CM

## 2022-01-24 DIAGNOSIS — Z00.00 MEDICARE ANNUAL WELLNESS VISIT, SUBSEQUENT: Primary | ICD-10-CM

## 2022-01-24 DIAGNOSIS — I48.19 PERSISTENT ATRIAL FIBRILLATION (HCC): ICD-10-CM

## 2022-01-24 DIAGNOSIS — Z23 NEED FOR PNEUMOCOCCAL VACCINATION: ICD-10-CM

## 2022-01-24 PROBLEM — R41.82 ALTERED MENTAL STATE: Status: RESOLVED | Noted: 2020-08-25 | Resolved: 2022-01-24

## 2022-01-24 PROBLEM — J38.7 VALLECULAR MASS: Status: RESOLVED | Noted: 2020-06-18 | Resolved: 2022-01-24

## 2022-01-24 PROBLEM — I25.10 CORONARY ARTERY CALCIFICATION SEEN ON CT SCAN: Status: RESOLVED | Noted: 2021-07-18 | Resolved: 2022-01-24

## 2022-01-24 PROBLEM — R13.10 DYSPHAGIA: Status: RESOLVED | Noted: 2020-06-22 | Resolved: 2022-01-24

## 2022-01-24 PROBLEM — E21.3 HYPERPARATHYROIDISM (HCC): Status: RESOLVED | Noted: 2017-03-30 | Resolved: 2022-01-24

## 2022-01-24 PROCEDURE — G8484 FLU IMMUNIZE NO ADMIN: HCPCS | Performed by: FAMILY MEDICINE

## 2022-01-24 PROCEDURE — G0439 PPPS, SUBSEQ VISIT: HCPCS | Performed by: FAMILY MEDICINE

## 2022-01-24 PROCEDURE — G0009 ADMIN PNEUMOCOCCAL VACCINE: HCPCS | Performed by: FAMILY MEDICINE

## 2022-01-24 PROCEDURE — 1123F ACP DISCUSS/DSCN MKR DOCD: CPT | Performed by: FAMILY MEDICINE

## 2022-01-24 PROCEDURE — 4040F PNEUMOC VAC/ADMIN/RCVD: CPT | Performed by: FAMILY MEDICINE

## 2022-01-24 PROCEDURE — 90732 PPSV23 VACC 2 YRS+ SUBQ/IM: CPT | Performed by: FAMILY MEDICINE

## 2022-01-24 ASSESSMENT — PATIENT HEALTH QUESTIONNAIRE - PHQ9
SUM OF ALL RESPONSES TO PHQ9 QUESTIONS 1 & 2: 0
2. FEELING DOWN, DEPRESSED OR HOPELESS: 0
SUM OF ALL RESPONSES TO PHQ QUESTIONS 1-9: 0
1. LITTLE INTEREST OR PLEASURE IN DOING THINGS: 0
SUM OF ALL RESPONSES TO PHQ QUESTIONS 1-9: 0

## 2022-01-24 NOTE — PROGRESS NOTES
Chief Complaint   Patient presents with   [facility]     Medicare Annual Wellness Visit  Name: Harry Linares Date: 2022   MRN: [id number] Sex: Female   Age: 80 y.o. Ethnicity: Non- / Non    : 1935 Race: White (non-)      Merlin Cotter is here for Medicare AWV      Patient is 68-year-old female here for annual Medicare wellness. Patient is doing well and denies any concerns today. Compliant with current medications. Patient came in with her daughter and . Gets Meals on Wheels 3 times a week and daughter David Cintron helps with grocery. She still cooks as needed. Patient denies any chest pains, shortness of breath, abdominal pain, melena or hematochezia. No signs of bleeding from Eliquis. Reviewed blood test done on 2021 from Dr. Carlton Noe: CBC within normal limits, glucose 101, BUN 21, creatinine 0.94, calcium 9.7, total cholesterol 214, triglyceride 128, HDL 58, , ALT 9, AST 14    Patient developed A. fib after acute pancreatitis due to gallstone in 2021. Had cholecystectomy and remained on A. fib. S/p DCCV on 9/10/2021 but only responded briefly. Goes to Dr. Araceli Londono for management. On Eliquis 2.5 mg twice a day for anticoagulation and metoprolol 100 mg daily and diltiazem 120 mg daily for rate control. [id number] Vasc score 6. Last visit with Dr. Araceli Londono was on 12/3/2021 and recommended 3 months follow-up. Has not made appointment with Dr. 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 Because of recent A. fib, on Eliquis for anticoagulation, need to get clearance from cardiologist if she can be off Eliquis if she will need parathyroid biopsy. Screenings for behavioral, psychosocial and functional/safety risks, and cognitive dysfunction are all negative except as indicated below. These results, as well as other patient data from the [address] form, are documented in Flowsheets linked to this Encounter.     Allergies   Allergen Reactions    Benzonatate Hives    Ciprofloxacin      Leg swelling    Clarithromycin Hives    Cortisone     Prednisone      Facial swelling and redness    Sulfa Antibiotics Rash, Itching and Hives          Current Outpatient Medications   Medication Sig Dispense Refill    lisinopril (PRINIVIL;ZESTRIL) 5 MG tablet Take 1 tablet by mouth 2 times daily 60 tablet 2    metoprolol succinate (TOPROL XL) 100 MG extended release tablet Take 0.5 tablets by mouth daily 30 tablet 1    dilTIAZem (CARDIZEM CD) 120 MG extended release capsule Take 1 capsule by mouth daily 30 capsule 1    apixaban (ELIQUIS) 2.5 MG TABS tablet Take 1 tablet by mouth 2 times daily 60 tablet 1    escitalopram (LEXAPRO) 5 MG tablet Take 5 mg by mouth daily      aspirin 81 MG EC tablet Take 1 tablet by mouth daily (Patient taking differently: Take 81 mg by mouth daily Every other day) 30 tablet 3    vitamin B-12 (CYANOCOBALAMIN) 1000 MCG tablet Take 1,000 mcg by mouth daily      solifenacin (VESICARE) 10 MG tablet Take 10 mg by mouth daily.  Cholecalciferol (VITAMIN D) 1000 UNIT CAPS Take 1,000 Units by mouth daily.  fluticasone (FLONASE) 50 MCG/ACT nasal spray fluticasone propionate 50 mcg/actuation nasal spray,suspension (Patient not taking: Reported on 1/24/2022)      cetirizine (ZYRTEC) 10 MG tablet Take 10 mg by mouth daily. (Patient not taking: Reported on 1/24/2022)       No current facility-administered medications for this visit.         Past Medical History:   Diagnosis Date    Acute pancreatitis 03/10/2017    Due to hypercalcemia    Age related osteoporosis 01/30/2017    DEXA scan showed T score: lumbar spine -3.9, right hip -3.3, left hip -3.3    Allergies     Altered mental state 8/25/2020    Atrial fibrillation with RVR ([facility] [address] 07/17/2021    Hospitalized due to acute pancreatitis    Coronary artery calcification seen on CT scan 7/18/2021    Dysphagia 6/22/2020    Gallstone pancreatitis 07/20/2021    Had cholecystectomy    GERD (gastroesophageal reflux disease)     History of arthritis 4/21/2015    Hydronephrosis 11/22/2010    Hyperlipidemia     Hypertension     Nephrolithiasis     23years old    Primary osteoarthritis of right knee 10/31/2016    Shingles 04/28/2020    Left hip area, also had shingles in 1996 affecting the left side of the face    TIA (transient ischemic attack) 06/17/2020    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 fib by Dr. Wing Poon Bilateral 03/21/2012    By Dr. Karlie Lakhani, LAPAROSCOPIC N/A 07/20/2021    LAPAROSCOPIC [facility] CHOLANGIOGRAM performed by Dorie Bowers MD at [address]  10/26/2010    With dilatation/left ureteroscopy due to stricture    EXCISION OF PARATHYROID MASS  05/11/2017    EXCISION OF LEFT PARATHYROID ADENOMA WITH FROZEN SECTION    HEMORRHOID SURGERY  07/05/2000    By Dr. Armen Boswell Right 10/31/2016    Right TKA by Dr. De Anda [facility]         Family History   Problem Relation Age of Onset    Cancer Mother         breast    Migraines Mother     Migraines Sister     Migraines Brother        CareTeam (Including outside providers/suppliers regularly involved in providing care):   Patient Care Team:  Sal Dandy, MD as PCP - General (Family Medicine)    Wt Readings from Last 3 Encounters:   01/24/22 123 lb 12.8 oz (56.2 kg)   12/03/21 125 lb (56.7 kg)   09/23/21 121 lb (54.9 kg)     /80 (Site: Right Upper Arm, Position: Sitting, Cuff Size: Medium Adult)   Pulse 84   Temp 98 °F (36.7 °C)   Ht 5' 0.9\" (1.547 m)   Wt 123 lb 12.8 oz (56.2 kg)   SpO2 99%   BMI 23.47 kg/m²      Based upon direct observation of the patient, evaluation of cognition reveals recent and remote memory intact. Review of Systems    Physical Exam     Patient's complete Health Risk Assessment and screening values have been reviewed and are found in Flowsheets. The following problems were reviewed today and where indicated follow up appointments were made and/or referrals ordered. Positive Risk Factor Screenings with Interventions:           Health Habits/Nutrition:  Health Habits/Nutrition  Do you exercise for at least 20 minutes 2-3 times per week?: (!) No  Have you lost any weight without trying in the past 3 months?: No  Do you eat only one meal per day?: No  Have you seen the dentist within the past year?: (!) No  Body mass index: 23.47    Hearing/Vision:  No exam data present  Hearing/Vision  Do you or your family notice any trouble with your hearing that hasn't been managed with hearing aids?: (!) Yes  Do you have difficulty driving, watching TV, or doing any of your daily activities because of your eyesight?: No  Have you had an eye exam within the past year?: Yes      No Positive Risk Factors identified today.       Personalized Preventive Plan   Current Health Maintenance Status  Immunization History   Administered Date(s) Administered    COVID-19Rene, Primary or Immunocompromised, PF, 100mcg/0.5mL 01/26/2021, 02/23/2021, 11/17/2021    Influenza Vaccine, unspecified formulation 10/01/2012, 10/03/2013, 10/02/2014    Influenza Virus Vaccine 10/05/2015, 09/27/2016, 09/27/2018, 10/01/2019, 09/30/2020    Influenza Whole 11/01/2009    Influenza, High-dose, Quadv, 65 yrs +, IM (Fluzone) 10/14/2020    Influenza, Quadv, IM, PF (6 mo and older Fluzone, Flulaval, Fluarix, and 3 yrs and older Afluria) 09/22/2017    Influenza, Quadv, adjuvanted, 65 yrs +, IM, PF (Fluad) 08/18/2021    Pneumococcal Conjugate 13-valent ([id number]) 07/30/2018    Pneumococcal Polysaccharide ([id number]) 10/01/1997, 01/24/2022        Health Maintenance   Topic Date Due    Depression Screen  Never done   Guillermo Putnam DTaP/Tdap/Td vaccine (1 - Tdap) Never done    Shingles Vaccine (1 of 2) Never done   [employer] Visit (AWV)  Never done    Potassium monitoring  09/23/2022    Creatinine monitoring  09/23/2022    Flu vaccine  Completed    Pneumococcal 65+ years Vaccine  Completed    COVID-19 Vaccine  Completed    Hepatitis A vaccine  Aged Out    Hepatitis B vaccine  Aged Out    Hib vaccine  Aged Out    Meningococcal (ACWY) vaccine  Aged Out     Recommendations for Privatext Due: see orders and patient instructions/AVS.    1. Medicare annual wellness visit, subsequent      2. Persistent atrial fibrillation (HCC)  Continue to follow-up with Dr. Isis Metzger, cardiologist.  Currently on Toprol-XL 50 mg daily, diltiazem 120 mg daily and Eliquis 2.5 mg twice a day for anticoagulation. Discussed possible watchman device if develops side effects from Eliquis. 3. Essential hypertension, benign  Blood pressure stable at 130/80. Continue lisinopril 5 mg daily, diltiazem 120 mg daily and Toprol-XL 50 mg daily. 4. Need for pneumococcal vaccination  VIS given. No history of immunization reaction.  - Pneumococcal polysaccharide vaccine 23-valent greater than or equal to 1yo subcutaneous/IM    5. Anxiety/depression. Stable on Lexapro 5 mg daily    6. Stress/urge incontinence, stable on Vesicare 10 mg daily. 7.  Seasonal allergies  Takes Zyrtec and Flonase during spring. Return in about 6 months (around 7/24/2022) for HTN follow-up.     Recommended screening schedule for the next 5-10 years is provided to the patient in written form: see Patient Instructions/AVS.    Electronically signed by Dieter Marvin MD on 1/24/2022 at 3:24 PM.

## 2022-01-24 NOTE — PATIENT INSTRUCTIONS
Personalized Preventive Plan for [place] July - 1/24/2022  Medicare offers a range of preventive health benefits. Some of the tests and screenings are paid in full while other may be subject to a deductible, co-insurance, and/or copay. Some of these benefits include a comprehensive review of your medical history including lifestyle, illnesses that may run in your family, and various assessments and screenings as appropriate. After reviewing your medical record and screening and assessments performed today your provider may have ordered immunizations, labs, imaging, and/or referrals for you. A list of these orders (if applicable) as well as your Preventive Care list are included within your After Visit Summary for your review. Other Preventive Recommendations:    · A preventive eye exam performed by an eye specialist is recommended every 1-2 years to screen for glaucoma; cataracts, macular degeneration, and other eye disorders. · A preventive dental visit is recommended every 6 months. · Try to get at least 150 minutes of exercise per week or 10,000 steps per day on a pedometer . · Order or download the FREE \"Exercise & Physical Activity: Your Everyday Guide\" from [employer]. Call 8-751.743.9938 or search [employer] online. · You need 7355-3935 mg of calcium and 0623-1672 IU of vitamin D per day. It is possible to meet your calcium requirement with diet alone, but a vitamin D supplement is usually necessary to meet this goal.  · When exposed to the sun, use a sunscreen that protects against both UVA and UVB radiation with an SPF of 30 or greater. Reapply every 2 to 3 hours or after sweating, drying off with a towel, or swimming. · Always wear a seat belt when traveling in a car. Always wear a helmet when riding a bicycle or motorcycle.

## 2022-02-03 DIAGNOSIS — N39.41 URGE INCONTINENCE: ICD-10-CM

## 2022-02-03 DIAGNOSIS — I48.19 PERSISTENT ATRIAL FIBRILLATION (HCC): Primary | ICD-10-CM

## 2022-02-03 DIAGNOSIS — F41.9 ANXIETY: ICD-10-CM

## 2022-02-03 DIAGNOSIS — I10 ESSENTIAL HYPERTENSION, BENIGN: ICD-10-CM

## 2022-02-03 RX ORDER — METOPROLOL SUCCINATE 100 MG/1
50 TABLET, EXTENDED RELEASE ORAL DAILY
Qty: 90 TABLET | Refills: 3 | Status: SHIPPED | OUTPATIENT
Start: 2022-02-03

## 2022-02-03 RX ORDER — ESCITALOPRAM OXALATE 5 MG/1
5 TABLET ORAL DAILY
Qty: 90 TABLET | Refills: 3 | Status: SHIPPED | OUTPATIENT
Start: 2022-02-03

## 2022-02-03 RX ORDER — DILTIAZEM HYDROCHLORIDE 120 MG/1
120 CAPSULE, COATED, EXTENDED RELEASE ORAL DAILY
Qty: 90 CAPSULE | Refills: 3 | Status: SHIPPED | OUTPATIENT
Start: 2022-02-03 | End: 2022-02-07 | Stop reason: SDUPTHER

## 2022-02-03 RX ORDER — SOLIFENACIN SUCCINATE 10 MG/1
10 TABLET, FILM COATED ORAL DAILY
Qty: 90 TABLET | Refills: 3 | Status: SHIPPED | OUTPATIENT
Start: 2022-02-03 | End: 2022-04-04 | Stop reason: SDUPTHER

## 2022-02-03 RX ORDER — LISINOPRIL 5 MG/1
5 TABLET ORAL 2 TIMES DAILY
Qty: 180 TABLET | Refills: 3 | Status: SHIPPED | OUTPATIENT
Start: 2022-02-03

## 2022-02-07 ENCOUNTER — TELEPHONE (OUTPATIENT)
Dept: PRIMARY CARE CLINIC | Age: 87
End: 2022-02-07

## 2022-02-07 DIAGNOSIS — I48.19 PERSISTENT ATRIAL FIBRILLATION (HCC): ICD-10-CM

## 2022-02-07 DIAGNOSIS — I10 ESSENTIAL HYPERTENSION, BENIGN: ICD-10-CM

## 2022-02-07 RX ORDER — DILTIAZEM HYDROCHLORIDE 120 MG/1
120 CAPSULE, COATED, EXTENDED RELEASE ORAL DAILY
Qty: 90 CAPSULE | Refills: 3 | Status: SHIPPED | OUTPATIENT
Start: 2022-02-07

## 2022-02-07 NOTE — TELEPHONE ENCOUNTER
Pt is calling stating that her medication for Diltiazem when the pt calls her pharmacy they tell the pt there is no refills I advise the pt it was sent on 2/3/22 and she states the pharmacy is telling her there is no refills

## 2022-04-04 ENCOUNTER — TELEPHONE (OUTPATIENT)
Dept: PRIMARY CARE CLINIC | Age: 87
End: 2022-04-04

## 2022-04-04 DIAGNOSIS — N39.41 URGE INCONTINENCE: ICD-10-CM

## 2022-04-04 RX ORDER — SOLIFENACIN SUCCINATE 10 MG/1
10 TABLET, FILM COATED ORAL DAILY
Qty: 90 TABLET | Refills: 3 | Status: SHIPPED | OUTPATIENT
Start: 2022-04-04

## 2022-04-04 NOTE — TELEPHONE ENCOUNTER
Pt is calling for refill on   Rx refill request : solifenacin (VESICARE) 10 MG tablet  #90        Pharmacy: [facility]

## 2022-07-25 ENCOUNTER — OFFICE VISIT (OUTPATIENT)
Dept: PRIMARY CARE CLINIC | Age: 87
End: 2022-07-25
Payer: MEDICARE

## 2022-07-25 VITALS
WEIGHT: 130.2 LBS | RESPIRATION RATE: 14 BRPM | HEART RATE: 90 BPM | BODY MASS INDEX: 23.07 KG/M2 | SYSTOLIC BLOOD PRESSURE: 136 MMHG | OXYGEN SATURATION: 97 % | HEIGHT: 63 IN | DIASTOLIC BLOOD PRESSURE: 86 MMHG | TEMPERATURE: 97.2 F

## 2022-07-25 DIAGNOSIS — I48.19 PERSISTENT ATRIAL FIBRILLATION (HCC): ICD-10-CM

## 2022-07-25 DIAGNOSIS — I10 ESSENTIAL HYPERTENSION, BENIGN: Primary | ICD-10-CM

## 2022-07-25 PROCEDURE — 1123F ACP DISCUSS/DSCN MKR DOCD: CPT | Performed by: FAMILY MEDICINE

## 2022-07-25 PROCEDURE — 99214 OFFICE O/P EST MOD 30 MIN: CPT | Performed by: FAMILY MEDICINE

## 2022-07-25 PROCEDURE — 1090F PRES/ABSN URINE INCON ASSESS: CPT | Performed by: FAMILY MEDICINE

## 2022-07-25 PROCEDURE — 1036F TOBACCO NON-USER: CPT | Performed by: FAMILY MEDICINE

## 2022-07-25 PROCEDURE — G8427 DOCREV CUR MEDS BY ELIG CLIN: HCPCS | Performed by: FAMILY MEDICINE

## 2022-07-25 PROCEDURE — G8420 CALC BMI NORM PARAMETERS: HCPCS | Performed by: FAMILY MEDICINE

## 2022-07-25 ASSESSMENT — PATIENT HEALTH QUESTIONNAIRE - PHQ9
SUM OF ALL RESPONSES TO PHQ QUESTIONS 1-9: 0
2. FEELING DOWN, DEPRESSED OR HOPELESS: 0
1. LITTLE INTEREST OR PLEASURE IN DOING THINGS: 0
SUM OF ALL RESPONSES TO PHQ9 QUESTIONS 1 & 2: 0

## 2022-08-01 ENCOUNTER — TELEPHONE (OUTPATIENT)
Dept: PRIMARY CARE CLINIC | Age: 87
End: 2022-08-01

## 2022-08-01 DIAGNOSIS — Z20.822 CLOSE EXPOSURE TO COVID-19 VIRUS: Primary | ICD-10-CM

## 2022-08-01 DIAGNOSIS — R05.9 COUGH IN ADULT: ICD-10-CM

## 2022-08-01 NOTE — TELEPHONE ENCOUNTER
Pt daughter is calling stating that last week she called in for the pt  about testing positive for COVID pt  was sent in Anti Covid medication From Dr.Reyes pt daughter is calling stating that now the pt is showing symptoms and they do not wear mask in the home pt daughter states that she tested the pt with a at home test but came back negative pt daughter is asking if she should continue treating for symptoms or should the pt be started on the COVID Anti viral medication pt daughter is asking if medication is prescribed to be sent to [facility] on [address]

## 2022-08-01 NOTE — TELEPHONE ENCOUNTER
We will send an order for PCR COVID testing for confirmation. Assist patient where they can get tested. Required to have positive COVID test for EUA Covid medication can be prescribed.

## 2022-08-01 NOTE — TELEPHONE ENCOUNTER
Spoke with the pt's daughter. She will take her mom tomorrow to get tested them wait for results.  Pt started with sx 7/31/22

## 2022-08-02 DIAGNOSIS — Z20.822 CLOSE EXPOSURE TO COVID-19 VIRUS: ICD-10-CM

## 2022-08-02 DIAGNOSIS — R05.9 COUGH IN ADULT: ICD-10-CM

## 2022-08-03 DIAGNOSIS — U07.1 COVID-19 VIRUS INFECTION: Primary | ICD-10-CM

## 2022-08-03 LAB — SARS-COV-2, PCR: DETECTED

## 2022-08-22 DIAGNOSIS — I10 ESSENTIAL HYPERTENSION, BENIGN: ICD-10-CM

## 2022-08-22 DIAGNOSIS — I48.19 PERSISTENT ATRIAL FIBRILLATION (HCC): ICD-10-CM

## 2022-08-22 LAB
ANION GAP SERPL CALCULATED.3IONS-SCNC: 10 MMOL/L (ref 3–16)
BASOPHILS ABSOLUTE: 0.1 K/UL (ref 0–0.2)
BASOPHILS RELATIVE PERCENT: 0.8 %
BUN BLDV-MCNC: 14 MG/DL (ref 7–20)
CALCIUM SERPL-MCNC: 9.3 MG/DL (ref 8.3–10.6)
CHLORIDE BLD-SCNC: 105 MMOL/L (ref 99–110)
CHOLESTEROL, TOTAL: 288 MG/DL (ref 0–199)
CO2: 27 MMOL/L (ref 21–32)
CREAT SERPL-MCNC: 0.9 MG/DL (ref 0.6–1.2)
EOSINOPHILS ABSOLUTE: 0.4 K/UL (ref 0–0.6)
EOSINOPHILS RELATIVE PERCENT: 4.1 %
GFR AFRICAN AMERICAN: >60
GFR NON-AFRICAN AMERICAN: 59
GLUCOSE BLD-MCNC: 94 MG/DL (ref 70–99)
HCT VFR BLD CALC: 41.7 % (ref 36–48)
HDLC SERPL-MCNC: 59 MG/DL (ref 40–60)
HEMOGLOBIN: 14.1 G/DL (ref 12–16)
LDL CHOLESTEROL CALCULATED: 195 MG/DL
LYMPHOCYTES ABSOLUTE: 2.3 K/UL (ref 1–5.1)
LYMPHOCYTES RELATIVE PERCENT: 26.2 %
MCH RBC QN AUTO: 30.6 PG (ref 26–34)
MCHC RBC AUTO-ENTMCNC: 33.8 G/DL (ref 31–36)
MCV RBC AUTO: 90.5 FL (ref 80–100)
MONOCYTES ABSOLUTE: 0.8 K/UL (ref 0–1.3)
MONOCYTES RELATIVE PERCENT: 9 %
NEUTROPHILS ABSOLUTE: 5.2 K/UL (ref 1.7–7.7)
NEUTROPHILS RELATIVE PERCENT: 59.9 %
PDW BLD-RTO: 13.8 % (ref 12.4–15.4)
PLATELET # BLD: 199 K/UL (ref 135–450)
PMV BLD AUTO: 10.3 FL (ref 5–10.5)
POTASSIUM SERPL-SCNC: 4.4 MMOL/L (ref 3.5–5.1)
RBC # BLD: 4.61 M/UL (ref 4–5.2)
SODIUM BLD-SCNC: 142 MMOL/L (ref 136–145)
T4 FREE: 1 NG/DL (ref 0.9–1.8)
TRIGL SERPL-MCNC: 169 MG/DL (ref 0–150)
TSH SERPL DL<=0.05 MIU/L-ACNC: 1.38 UIU/ML (ref 0.27–4.2)
VLDLC SERPL CALC-MCNC: 34 MG/DL
WBC # BLD: 8.7 K/UL (ref 4–11)

## 2022-08-24 DIAGNOSIS — E78.2 MIXED HYPERLIPIDEMIA: Primary | ICD-10-CM

## 2022-08-24 RX ORDER — ROSUVASTATIN CALCIUM 5 MG/1
5 TABLET, COATED ORAL NIGHTLY
Qty: 90 TABLET | Refills: 3 | Status: SHIPPED | OUTPATIENT
Start: 2022-08-24

## 2023-01-30 NOTE — PROGRESS NOTES
Chief Complaint   Patient presents with    Hyperlipidemia    Hypertension     Subjective:   Loretta Beebe is a 80 y.o. female here for 6 months follow-up on her hypertension and hyperlipidemia. Patient has chronic A. fib, failed cardioversion and goes to her cardiologist.  Currently on Eliquis for anticoagulation. Patient lives with her  and they are still living independently. Patient denies any chest pains, shortness of breath, abdominal pain, melena hematochezia. No dizziness, headache or syncopal episode. No signs of bleeding from anticoagulation. No falls. Emotionally doing well on Lexapro. Patient came in with her  and daughter,Bita. Rakesh Hammond helps with groceries, take her to her office visits. Patient still cooks as needed. They get Meals on Wheels 3 times a week. Current Outpatient Medications   Medication Sig Dispense Refill    solifenacin (VESICARE) 10 MG tablet Take 1 tablet by mouth daily 90 tablet 3    dilTIAZem (CARDIZEM CD) 120 MG extended release capsule Take 1 capsule by mouth daily 90 capsule 3    lisinopril (PRINIVIL;ZESTRIL) 5 MG tablet Take 1 tablet by mouth 2 times daily 180 tablet 3    metoprolol succinate (TOPROL XL) 100 MG extended release tablet Take 0.5 tablets by mouth daily 90 tablet 3    apixaban (ELIQUIS) 2.5 MG TABS tablet Take 1 tablet by mouth 2 times daily 180 tablet 3    escitalopram (LEXAPRO) 5 MG tablet Take 1 tablet by mouth daily 90 tablet 3    fluticasone (FLONASE) 50 MCG/ACT nasal spray fluticasone propionate 50 mcg/actuation nasal spray,suspension      aspirin 81 MG EC tablet Take 1 tablet by mouth daily (Patient taking differently: Take 81 mg by mouth in the morning. Every other day.) 30 tablet 3    vitamin B-12 (CYANOCOBALAMIN) 1000 MCG tablet Take 1,000 mcg by mouth daily      Cholecalciferol (VITAMIN D) 1000 UNIT CAPS Take 1,000 Units by mouth daily. cetirizine (ZYRTEC) 10 MG tablet Take 10 mg by mouth in the morning.        No current [de-identified] : Constitutional:  \par - No acute distress  \par - Well developed; well nourished  \par \par Neurological:  \par - normal mood and affect  \par - alert and oriented x 3   \par \par Cardiovascular:  \par - grossly normal \par \par Lumbar Spine Exam: \par \par Inspection: \par erythema (-) \par ecchymosis (-) \par rashes (-) \par alignment: no scoliosis \par Well-healed midline surgical scar\par \par Palpation: \par Midline lumbar tenderness:            (-) \par midline thoracic tenderness:          (-) \par Lumbar paraspinal tenderness:  L (-) ; R (+) \par thoracic paraspinal tenderness: L (-) ; R (-) \par sciatic nerve tenderness :          L (-) ; R (-) \par SI joint tenderness:                     L (-) ; R (+) \par GTB tenderness:                        L (-);  R (-) \par \par ROM: Full range of motion with mild stiffness at extremes of extension/flexion\par Pain with extremes of extension= flexion\par \par Strength: \par                                    Right       Left    \par Hip Flexion:                (5/5)       (4+/5) \par Quadriceps:               (5/5)       (4+/5) \par Hamstrings:                (5/5)       (5/5) \par Ankle Dorsiflexion:     (5/5)       (4+/5) \par EHL:                           (5/5)       (4+/5) \par Ankle Plantarflexion:  (5/5)       (5/5) \par \par Special Tests: \par SLR:                            R (Eq) ; L (-) \par Facet loading:             R (+) ; L (+) \par THAO test:                R (-) ; L (-) \par Hamstring tightness:   R (+);  L (+) \par \par Neurologic: \par SILT throughout right lower extremity \par SILT throughout left lower extremity \par \par Reflexes normal and symmetric bilateral lower extremities \par \par Gait: \par non- antalgic gait \par ambulates without assistive device

## 2023-02-01 DIAGNOSIS — I48.19 PERSISTENT ATRIAL FIBRILLATION (HCC): ICD-10-CM

## 2023-02-02 DIAGNOSIS — I48.19 PERSISTENT ATRIAL FIBRILLATION (HCC): ICD-10-CM

## 2023-02-02 DIAGNOSIS — I10 ESSENTIAL HYPERTENSION, BENIGN: ICD-10-CM

## 2023-02-02 RX ORDER — APIXABAN 2.5 MG/1
TABLET, FILM COATED ORAL
Qty: 180 TABLET | Refills: 3 | Status: SHIPPED | OUTPATIENT
Start: 2023-02-02

## 2023-02-02 RX ORDER — DILTIAZEM HYDROCHLORIDE 120 MG/1
CAPSULE, COATED, EXTENDED RELEASE ORAL
Qty: 90 CAPSULE | Refills: 3 | Status: SHIPPED | OUTPATIENT
Start: 2023-02-02

## 2023-02-26 DIAGNOSIS — I10 ESSENTIAL HYPERTENSION, BENIGN: ICD-10-CM

## 2023-02-27 RX ORDER — LISINOPRIL 5 MG/1
TABLET ORAL
Qty: 180 TABLET | Refills: 3 | Status: SHIPPED | OUTPATIENT
Start: 2023-02-27

## 2023-02-27 NOTE — TELEPHONE ENCOUNTER
Recent Visits  Date Type Provider Dept   07/25/22 Office Visit Mariia Mak MD [facility]   01/24/22 Office Visit Mariia Mak MD [facility] [address] recent visits within past 540 days with a meds authorizing provider and meeting all other requirements  Future Appointments  Date Type Provider Dept   03/03/23 Appointment Mariia Mak MD [facility]   Showing future appointments within next 150 days with a meds authorizing provider and meeting all other requirements

## 2023-03-31 DIAGNOSIS — I10 ESSENTIAL HYPERTENSION, BENIGN: ICD-10-CM

## 2023-03-31 RX ORDER — METOPROLOL SUCCINATE 50 MG/1
50 TABLET, EXTENDED RELEASE ORAL DAILY
Qty: 90 TABLET | Refills: 3 | Status: SHIPPED | OUTPATIENT
Start: 2023-03-31

## 2023-03-31 NOTE — TELEPHONE ENCOUNTER
Recent Visits  Date Type Provider Dept   07/25/22 Office Visit Kayla Michele MD [facility]   01/24/22 Office Visit Kayla Michele MD [facility] [address] recent visits within past 540 days with a meds authorizing provider and meeting all other requirements  Future Appointments  Date Type Provider Dept   04/04/23 Appointment Kayla Michele MD [facility]   Showing future appointments within next 150 days with a meds authorizing provider and meeting all other requirements

## 2023-04-04 ENCOUNTER — OFFICE VISIT (OUTPATIENT)
Dept: PRIMARY CARE CLINIC | Age: 88
End: 2023-04-04

## 2023-04-04 VITALS
HEART RATE: 61 BPM | BODY MASS INDEX: 24.17 KG/M2 | OXYGEN SATURATION: 97 % | WEIGHT: 128 LBS | SYSTOLIC BLOOD PRESSURE: 124 MMHG | DIASTOLIC BLOOD PRESSURE: 82 MMHG | HEIGHT: 61 IN

## 2023-04-04 DIAGNOSIS — I48.19 PERSISTENT ATRIAL FIBRILLATION (HCC): ICD-10-CM

## 2023-04-04 DIAGNOSIS — I10 ESSENTIAL HYPERTENSION, BENIGN: ICD-10-CM

## 2023-04-04 DIAGNOSIS — Z00.00 MEDICARE ANNUAL WELLNESS VISIT, SUBSEQUENT: Primary | ICD-10-CM

## 2023-04-04 DIAGNOSIS — E78.2 MIXED HYPERLIPIDEMIA: ICD-10-CM

## 2023-04-04 ASSESSMENT — MINI MENTAL STATE EXAM
PLACE DESIGN, ERASER AND PENCIL IN FRONT OF THE PERSON.  SAY:  COPY THIS DESIGN PLEASE.  SHOW: DESIGN. ALLOW: MULTIPLE TRIES. WAIT UNTIL PERSON IS FINISHED AND HANDS IT BACK. SCORE: ONLY FOR DIAGRAM WITH 4-SIDED FIGURE BETWEEN TWO 5-SIDED FIGURES: 1
SAY: I WOULD LIKE YOU TO REPEAT THIS PHRASE AFTER ME: NO IFS, ANDS, OR BUTS.: 1
HAND THE PERSON A PENCIL AND PAPER. SAY: WRITE ANY COMPLETE SENTENCE ON THAT
PIECE OF PAPER. (NOTE: THE SENTENCE MUST MAKE SENSE. IGNORE SPELLING ERRORS): 1
WHAT FLOOR ARE WE ON [IN FACILITY]?/ WHAT ROOM ARE WE IN [IN HOME]?: 1
WHAT IS THE NAME OF THIS BUILDING [IN FACILITY]?/WHAT IS THE STREET ADDRESS OF THIS HOUSE [IN HOME]?: 1
WHAT MONTH IS THIS?: 1
WHAT YEAR IS THIS?: 1
SUM ALL MMSE QUESTIONS FOR TOTAL SCORE [OUT OF 30].: 30
SAY: PUT THE PAPER DOWN ON THE FLOOR, SCORE IF PAPER IS PLACED BACK ON FLOOR: 1
WHAT IS TODAY'S DATE?: 1
ASK THE PERSON IF HE IS RIGHT OR LEFT-HANDED. TAKE A PIECE OF PAPER AND HOLD IT UP IN
FRONT OF THE PERSON. SAY: TAKE THIS PAPER IN YOUR RIGHT/LEFT HAND (WHICHEVER IS NON-
DOMINANT), SCORE IF PAPER IS PICKED UP IN CORRECT HAND.: 1
NOW WHAT WERE THE THREE OBJECTS I ASKED YOU TO REMEMBER?: 3
WHAT COUNTRY ARE WE IN?: 1
SAY: I AM GOING TO NAME THREE OBJECTS. WHEN I AM FINISHED, I WANT YOU TO REPEAT
THEM. REMEMBER WHAT THEY ARE BECAUSE I AM GOING TO ASK YOU TO NAME THEM AGAIN IN
A FEW MINUTES.  SAY THE FOLLOWING WORDS SLOWLY AT 1-SECOND INTERVALS - BALL/ CAR/ MAN [ITERATIONS FOR REPEAT ADMINISTRATION]: 3
WHAT STATE [OR PROVINCE] ARE WE IN?: 1
SAY: I WOULD LIKE YOU TO COUNT BACKWARD FROM 100 BY SEVENS: 5
WHAT DAY OF THE WEEK IS THIS?: 1
SHOW: PENCIL [OBJECT] ASK: WHAT IS THIS CALLED?: 1
WHAT CITY/TOWN ARE WE IN?: 1
WHICH SEASON IS THIS?: 1
SAY: FOLD THE PAPER IN HALF ONCE WITH BOTH HANDS, SCORE IF PAPER IS CORRECTLY FOLDED IN HALF.: 1
SHOW: WRISTWATCH [OBJECT] ASK: WHAT IS THIS CALLED?: 1
SAY: READ THE WORDS ON THE PAGE AND THEN DO WHAT IT SAYS. THEN HAND THE PERSON
THE SHEET WITH CLOSE YOUR EYES ON IT. IF THE SUBJECT READS AND DOES NOT CLOSE THEIR EYES, REPEAT UP TO THREE TIMES. SCORE ONLY IF SUBJECT CLOSES EYES.: 1

## 2023-04-04 ASSESSMENT — PATIENT HEALTH QUESTIONNAIRE - PHQ9
1. LITTLE INTEREST OR PLEASURE IN DOING THINGS: 0
2. FEELING DOWN, DEPRESSED OR HOPELESS: 0
SUM OF ALL RESPONSES TO PHQ QUESTIONS 1-9: 0
SUM OF ALL RESPONSES TO PHQ9 QUESTIONS 1 & 2: 0
SUM OF ALL RESPONSES TO PHQ QUESTIONS 1-9: 0

## 2023-04-04 ASSESSMENT — LIFESTYLE VARIABLES
HOW MANY STANDARD DRINKS CONTAINING ALCOHOL DO YOU HAVE ON A TYPICAL DAY: PATIENT DOES NOT DRINK
HOW OFTEN DO YOU HAVE A DRINK CONTAINING ALCOHOL: NEVER

## 2023-04-04 NOTE — PROGRESS NOTES
IM, 50mcg/0.25mL 07/25/2022    Influenza Vaccine, unspecified formulation 10/01/2012, 10/03/2013, 10/02/2014    Influenza Virus Vaccine 10/05/2015, 09/27/2016, 09/27/2018, 10/01/2019, 09/30/2020    Influenza Whole 11/01/2009    Influenza, FLUAD, (age 72 y+), Adjuvanted, 0.5mL 08/18/2021, 09/28/2022    Influenza, FLUARIX, FLULAVAL, FLUZONE (age 10 mo+) AND AFLURIA, (age 1 y+), PF, 0.5mL 09/22/2017    Influenza, FLUZONE (age 72 y+), High Dose, 0.7mL 10/14/2020    Pneumococcal, PCV-13, PREVNAR 15, (age 6w+), IM, 0.5mL 07/30/2018    Pneumococcal, PPSV23, PNEUMOVAX 23, (age 2y+), SC/IM, 0.5mL 10/01/1997, 01/24/2022        Health Maintenance   Topic Date Due    DTaP/Tdap/Td vaccine (1 - Tdap) Never done    Shingles vaccine (1 of 2) Never done    Lipids  08/22/2023    Depression Screen  04/04/2024    Annual Wellness Visit (AWV)  04/04/2024    Flu vaccine  Completed    Pneumococcal 65+ years Vaccine  Completed    COVID-19 Vaccine  Completed    Hepatitis A vaccine  Aged Out    Hib vaccine  Aged Out    Meningococcal (ACWY) vaccine  Aged Out    DEXA (modify frequency per FRAX score)  Discontinued     Recommendations for Preventive Services Due: see orders and patient instructions/AVS.      1. Medicare annual wellness visit, subsequent    - Basic Metabolic Panel; Future  - Lipid Panel; Future  - CBC with Auto Differential; Future    2. Essential hypertension, benign  Blood pressure stable at 124/82 and the goal is to keep it under 140/90. Continue lisinopril 5 mg daily, diltiazem 120 mg daily and Toprol-XL 50 mg daily. Watch salt intake. - Basic Metabolic Panel; Future  - Lipid Panel; Future    3. Mixed hyperlipidemia  So far tolerating Crestor 5 mg at night. Last LDL was 195 and goal is to keep it under 100. Will call with test results. - Lipid Panel; Future  - Hepatic Function Panel; Future    4. Persistent atrial fibrillation ([address]  Advised to make an appointment with Dr. Juan Marquez, electrophysiologist, for follow-up.   So

## 2023-04-26 DIAGNOSIS — N39.41 URGE INCONTINENCE: ICD-10-CM

## 2023-04-26 NOTE — TELEPHONE ENCOUNTER
Recent Visits  Date Type Provider Dept   04/04/23 Office Visit Adam Paez MD [facility]   07/25/22 Office Visit Adam Paez MD [facility]   01/24/22 Office Visit Adam Paez MD [facility] [address] recent visits within past 540 days with a meds authorizing provider and meeting all other requirements  Future Appointments  No visits were found meeting these conditions.   Showing future appointments within next 150 days with a meds authorizing provider and meeting all other requirements

## 2023-04-27 RX ORDER — SOLIFENACIN SUCCINATE 10 MG/1
TABLET, FILM COATED ORAL
Qty: 90 TABLET | Refills: 3 | Status: SHIPPED | OUTPATIENT
Start: 2023-04-27

## 2023-06-05 DIAGNOSIS — I10 ESSENTIAL HYPERTENSION, BENIGN: ICD-10-CM

## 2023-06-05 DIAGNOSIS — Z00.00 MEDICARE ANNUAL WELLNESS VISIT, SUBSEQUENT: ICD-10-CM

## 2023-06-05 DIAGNOSIS — E78.2 MIXED HYPERLIPIDEMIA: ICD-10-CM

## 2023-06-05 DIAGNOSIS — I48.19 PERSISTENT ATRIAL FIBRILLATION (HCC): ICD-10-CM

## 2023-06-05 LAB
ALBUMIN SERPL-MCNC: 4.1 G/DL (ref 3.4–5)
ALP SERPL-CCNC: 96 U/L (ref 40–129)
ALT SERPL-CCNC: 8 U/L (ref 10–40)
ANION GAP SERPL CALCULATED.3IONS-SCNC: 9 MMOL/L (ref 3–16)
AST SERPL-CCNC: 15 U/L (ref 15–37)
BASOPHILS # BLD: 0.1 K/UL (ref 0–0.2)
BASOPHILS NFR BLD: 1.2 %
BILIRUB DIRECT SERPL-MCNC: <0.2 MG/DL (ref 0–0.3)
BILIRUB INDIRECT SERPL-MCNC: ABNORMAL MG/DL (ref 0–1)
BILIRUB SERPL-MCNC: 0.5 MG/DL (ref 0–1)
BUN SERPL-MCNC: 14 MG/DL (ref 7–20)
CALCIUM SERPL-MCNC: 9.9 MG/DL (ref 8.3–10.6)
CHLORIDE SERPL-SCNC: 101 MMOL/L (ref 99–110)
CHOLEST SERPL-MCNC: 151 MG/DL (ref 0–199)
CO2 SERPL-SCNC: 27 MMOL/L (ref 21–32)
CREAT SERPL-MCNC: 1 MG/DL (ref 0.6–1.2)
DEPRECATED RDW RBC AUTO: 13.4 % (ref 12.4–15.4)
EOSINOPHIL # BLD: 0.2 K/UL (ref 0–0.6)
EOSINOPHIL NFR BLD: 3.2 %
GFR SERPLBLD CREATININE-BSD FMLA CKD-EPI: 54 ML/MIN/{1.73_M2}
GLUCOSE SERPL-MCNC: 103 MG/DL (ref 70–99)
HCT VFR BLD AUTO: 41.4 % (ref 36–48)
HDLC SERPL-MCNC: 62 MG/DL (ref 40–60)
HGB BLD-MCNC: 14.3 G/DL (ref 12–16)
LDLC SERPL CALC-MCNC: 60 MG/DL
LYMPHOCYTES # BLD: 1.6 K/UL (ref 1–5.1)
LYMPHOCYTES NFR BLD: 21.3 %
MCH RBC QN AUTO: 31 PG (ref 26–34)
MCHC RBC AUTO-ENTMCNC: 34.5 G/DL (ref 31–36)
MCV RBC AUTO: 90 FL (ref 80–100)
MONOCYTES # BLD: 0.6 K/UL (ref 0–1.3)
MONOCYTES NFR BLD: 8.7 %
NEUTROPHILS # BLD: 4.8 K/UL (ref 1.7–7.7)
NEUTROPHILS NFR BLD: 65.6 %
PLATELET # BLD AUTO: 252 K/UL (ref 135–450)
PMV BLD AUTO: 9.4 FL (ref 5–10.5)
POTASSIUM SERPL-SCNC: 4.7 MMOL/L (ref 3.5–5.1)
PROT SERPL-MCNC: 6.4 G/DL (ref 6.4–8.2)
RBC # BLD AUTO: 4.6 M/UL (ref 4–5.2)
SODIUM SERPL-SCNC: 137 MMOL/L (ref 136–145)
TRIGL SERPL-MCNC: 144 MG/DL (ref 0–150)
VLDLC SERPL CALC-MCNC: 29 MG/DL
WBC # BLD AUTO: 7.3 K/UL (ref 4–11)

## 2023-08-04 DIAGNOSIS — E78.2 MIXED HYPERLIPIDEMIA: ICD-10-CM

## 2023-08-04 RX ORDER — ROSUVASTATIN CALCIUM 5 MG/1
TABLET, COATED ORAL
Qty: 90 TABLET | Refills: 3 | Status: SHIPPED | OUTPATIENT
Start: 2023-08-04

## 2023-08-04 NOTE — TELEPHONE ENCOUNTER
Medication:   Requested Prescriptions     Pending Prescriptions Disp Refills    rosuvastatin (CRESTOR) 5 MG tablet [Pharmacy Med Name: ROSUVASTATIN CALCIUM 5 MG TAB] 90 tablet 3     Sig: TAKE ONE TABLET BY MOUTH ONCE NIGHTLY       Last Filled:  4/26/2023    Patient Phone Number: [phone] (home)     Last appt: 4/4/2023   Next appt: 10/4/2023    Last Lipid:   Lab Results   Component Value Date/Time    CHOL 151 06/05/2023 11:04 AM    TRIG 144 06/05/2023 11:04 AM    HDL 62 06/05/2023 11:04 AM    LDLCALC 60 06/05/2023 11:04 AM

## 2023-10-04 ENCOUNTER — OFFICE VISIT (OUTPATIENT)
Dept: PRIMARY CARE CLINIC | Age: 88
End: 2023-10-04

## 2023-10-04 VITALS
SYSTOLIC BLOOD PRESSURE: 120 MMHG | OXYGEN SATURATION: 98 % | HEART RATE: 110 BPM | DIASTOLIC BLOOD PRESSURE: 78 MMHG | WEIGHT: 125.6 LBS | BODY MASS INDEX: 23.73 KG/M2

## 2023-10-04 DIAGNOSIS — Z23 FLU VACCINE NEED: ICD-10-CM

## 2023-10-04 DIAGNOSIS — I10 ESSENTIAL HYPERTENSION, BENIGN: Primary | ICD-10-CM

## 2023-10-04 SDOH — ECONOMIC STABILITY: INCOME INSECURITY: HOW HARD IS IT FOR YOU TO PAY FOR THE VERY BASICS LIKE FOOD, HOUSING, MEDICAL CARE, AND HEATING?: NOT HARD AT ALL

## 2023-10-04 SDOH — ECONOMIC STABILITY: FOOD INSECURITY: WITHIN THE PAST 12 MONTHS, THE FOOD YOU BOUGHT JUST DIDN'T LAST AND YOU DIDN'T HAVE MONEY TO GET MORE.: NEVER TRUE

## 2023-10-04 SDOH — ECONOMIC STABILITY: FOOD INSECURITY: WITHIN THE PAST 12 MONTHS, YOU WORRIED THAT YOUR FOOD WOULD RUN OUT BEFORE YOU GOT MONEY TO BUY MORE.: NEVER TRUE

## 2023-10-04 SDOH — ECONOMIC STABILITY: HOUSING INSECURITY
IN THE LAST 12 MONTHS, WAS THERE A TIME WHEN YOU DID NOT HAVE A STEADY PLACE TO SLEEP OR SLEPT IN A SHELTER (INCLUDING NOW)?: NO

## 2023-12-07 ENCOUNTER — OFFICE VISIT (OUTPATIENT)
Dept: PRIMARY CARE CLINIC | Age: 88
End: 2023-12-07

## 2023-12-07 VITALS
WEIGHT: 123 LBS | OXYGEN SATURATION: 97 % | BODY MASS INDEX: 23.24 KG/M2 | RESPIRATION RATE: 16 BRPM | TEMPERATURE: 97.7 F | HEART RATE: 86 BPM | DIASTOLIC BLOOD PRESSURE: 78 MMHG | SYSTOLIC BLOOD PRESSURE: 136 MMHG

## 2023-12-07 DIAGNOSIS — R30.0 DYSURIA: Primary | ICD-10-CM

## 2023-12-07 LAB
BILIRUBIN, POC: ABNORMAL
BLOOD URINE, POC: ABNORMAL
CLARITY, POC: CLEAR
COLOR, POC: YELLOW
GLUCOSE URINE, POC: NEGATIVE
KETONES, POC: NEGATIVE
LEUKOCYTE EST, POC: ABNORMAL
NITRITE, POC: NEGATIVE
PH, POC: 6
PROTEIN, POC: NEGATIVE
SPECIFIC GRAVITY, POC: 1.02
UROBILINOGEN, POC: 0.2

## 2024-02-08 DIAGNOSIS — N39.41 URGE INCONTINENCE: ICD-10-CM

## 2024-02-08 RX ORDER — SOLIFENACIN SUCCINATE 10 MG/1
TABLET, FILM COATED ORAL
Qty: 90 TABLET | Refills: 3 | Status: SHIPPED | OUTPATIENT
Start: 2024-02-08

## 2024-02-28 DIAGNOSIS — I10 ESSENTIAL HYPERTENSION, BENIGN: ICD-10-CM

## 2024-02-28 RX ORDER — LISINOPRIL 5 MG/1
TABLET ORAL
Qty: 180 TABLET | Refills: 3 | Status: SHIPPED | OUTPATIENT
Start: 2024-02-28

## 2024-02-28 NOTE — TELEPHONE ENCOUNTER
Recent Visits  Date Type Provider Dept   12/07/23 Office Visit Reyes, Eleia J, MD [facility]   10/04/23 Office Visit Reyes, Eleia J, MD [facility]   04/04/23 Office Visit Reyes, Eleia J, MD [facility]   Showing recent visits within past 540 days with a meds authorizing provider and meeting all other requirements  Future Appointments  Date Type Provider Dept   04/09/24 Appointment Reyes, Eleia J, MD [facility]   Showing future appointments within next 150 days with a meds authorizing provider and meeting all other requirements

## 2024-03-06 DIAGNOSIS — I48.19 PERSISTENT ATRIAL FIBRILLATION (HCC): ICD-10-CM

## 2024-03-06 DIAGNOSIS — I10 ESSENTIAL HYPERTENSION, BENIGN: ICD-10-CM

## 2024-03-06 RX ORDER — DILTIAZEM HYDROCHLORIDE 120 MG/1
120 CAPSULE, COATED, EXTENDED RELEASE ORAL DAILY
Qty: 90 CAPSULE | Refills: 3 | Status: SHIPPED | OUTPATIENT
Start: 2024-03-06

## 2024-03-06 NOTE — TELEPHONE ENCOUNTER
Recent Visits  Date Type Provider Dept   12/07/23 Office Visit Reyes, Eleia J, MD [facility]   10/04/23 Office Visit Reyes, Eleia J, MD [facility]   04/04/23 Office Visit Reyes, Eleia J, MD [facility]   Showing recent visits within past 540 days with a meds authorizing provider and meeting all other requirements  Future Appointments  Date Type Provider Dept   04/09/24 Appointment Reyes, Eleia J, MD [facility]   Showing future appointments within next 150 days with a meds authorizing provider and meeting all other requirements     Didn't pend Vesicare. Was sent to the pharmacy on 2/8/24

## 2024-03-06 NOTE — TELEPHONE ENCOUNTER
Pt requested refills on    solifenacin (VESICARE) 10 MG tablet     dilTIAZem (CARDIZEM CD) 120 MG extended release capsule     [facility] [id number] - [place] [address] SHIRLENE MILLER [phone]

## 2024-04-05 DIAGNOSIS — I10 ESSENTIAL HYPERTENSION, BENIGN: ICD-10-CM

## 2024-04-09 ENCOUNTER — OFFICE VISIT (OUTPATIENT)
Dept: PRIMARY CARE CLINIC | Age: 89
End: 2024-04-09

## 2024-04-09 VITALS
HEIGHT: 61 IN | OXYGEN SATURATION: 98 % | BODY MASS INDEX: 23.6 KG/M2 | HEART RATE: 102 BPM | SYSTOLIC BLOOD PRESSURE: 124 MMHG | WEIGHT: 125 LBS | DIASTOLIC BLOOD PRESSURE: 64 MMHG

## 2024-04-09 DIAGNOSIS — E78.2 MIXED HYPERLIPIDEMIA: ICD-10-CM

## 2024-04-09 DIAGNOSIS — I48.20 CHRONIC ATRIAL FIBRILLATION (HCC): ICD-10-CM

## 2024-04-09 DIAGNOSIS — D68.69 SECONDARY HYPERCOAGULABLE STATE (HCC): ICD-10-CM

## 2024-04-09 DIAGNOSIS — I10 ESSENTIAL HYPERTENSION, BENIGN: ICD-10-CM

## 2024-04-09 DIAGNOSIS — Z00.00 MEDICARE ANNUAL WELLNESS VISIT, SUBSEQUENT: Primary | ICD-10-CM

## 2024-04-09 LAB
BASOPHILS # BLD: 0.1 K/UL (ref 0–0.2)
BASOPHILS NFR BLD: 1.2 %
DEPRECATED RDW RBC AUTO: 14.8 % (ref 12.4–15.4)
EOSINOPHIL # BLD: 0.3 K/UL (ref 0–0.6)
EOSINOPHIL NFR BLD: 5 %
HCT VFR BLD AUTO: 39 % (ref 36–48)
HGB BLD-MCNC: 13.2 G/DL (ref 12–16)
LYMPHOCYTES # BLD: 1.9 K/UL (ref 1–5.1)
LYMPHOCYTES NFR BLD: 28.3 %
MCH RBC QN AUTO: 30.1 PG (ref 26–34)
MCHC RBC AUTO-ENTMCNC: 33.8 G/DL (ref 31–36)
MCV RBC AUTO: 89.1 FL (ref 80–100)
MONOCYTES # BLD: 0.6 K/UL (ref 0–1.3)
MONOCYTES NFR BLD: 9.2 %
NEUTROPHILS # BLD: 3.9 K/UL (ref 1.7–7.7)
NEUTROPHILS NFR BLD: 56.3 %
PLATELET # BLD AUTO: 250 K/UL (ref 135–450)
PMV BLD AUTO: 9.7 FL (ref 5–10.5)
RBC # BLD AUTO: 4.37 M/UL (ref 4–5.2)
WBC # BLD AUTO: 6.9 K/UL (ref 4–11)

## 2024-04-09 RX ORDER — METOPROLOL SUCCINATE 50 MG/1
50 TABLET, EXTENDED RELEASE ORAL DAILY
Qty: 90 TABLET | Refills: 3 | Status: SHIPPED | OUTPATIENT
Start: 2024-04-09

## 2024-04-09 ASSESSMENT — PATIENT HEALTH QUESTIONNAIRE - PHQ9
SUM OF ALL RESPONSES TO PHQ QUESTIONS 1-9: 0
1. LITTLE INTEREST OR PLEASURE IN DOING THINGS: NOT AT ALL
2. FEELING DOWN, DEPRESSED OR HOPELESS: NOT AT ALL
SUM OF ALL RESPONSES TO PHQ9 QUESTIONS 1 & 2: 0
SUM OF ALL RESPONSES TO PHQ QUESTIONS 1-9: 0

## 2024-04-09 NOTE — PATIENT INSTRUCTIONS
Personalized Preventive Plan for Janell Gaffney - 4/9/2024  Medicare offers a range of preventive health benefits. Some of the tests and screenings are paid in full while other may be subject to a deductible, co-insurance, and/or copay.    Some of these benefits include a comprehensive review of your medical history including lifestyle, illnesses that may run in your family, and various assessments and screenings as appropriate.    After reviewing your medical record and screening and assessments performed today your provider may have ordered immunizations, labs, imaging, and/or referrals for you.  A list of these orders (if applicable) as well as your Preventive Care list are included within your After Visit Summary for your review.    Other Preventive Recommendations:    A preventive eye exam performed by an eye specialist is recommended every 1-2 years to screen for glaucoma; cataracts, macular degeneration, and other eye disorders.  A preventive dental visit is recommended every 6 months.  Try to get at least 150 minutes of exercise per week or 10,000 steps per day on a pedometer .  Order or download the FREE \"Exercise & Physical Activity: Your Everyday Guide\" from [employer] [place] [employer]. Call 1-481.753.1086 or search [employer] [place] [employer] online.  You need 6366-6260 mg of calcium and 0676-5548 IU of vitamin D per day. It is possible to meet your calcium requirement with diet alone, but a vitamin D supplement is usually necessary to meet this goal.  When exposed to the sun, use a sunscreen that protects against both UVA and UVB radiation with an SPF of 30 or greater. Reapply every 2 to 3 hours or after sweating, drying off with a towel, or swimming.  Always wear a seat belt when traveling in a car. Always wear a helmet when riding a bicycle or motorcycle.

## 2024-04-09 NOTE — TELEPHONE ENCOUNTER
Recent Visits  Date Type Provider Dept   12/07/23 Office Visit Reyes, Eleia J, MD [facility]   10/04/23 Office Visit Reyes, Eleia J, MD [facility]   04/04/23 Office Visit Reyes, Eleia J, MD [facility]   Showing recent visits within past 540 days with a meds authorizing provider and meeting all other requirements  Today's Visits  Date Type Provider Dept   04/09/24 Appointment Reyes, Eleia J, MD [facility]   Showing today's visits with a meds authorizing provider and meeting all other requirements  Future Appointments  No visits were found meeting these conditions.  Showing future appointments within next 150 days with a meds authorizing provider and meeting all other requirements

## 2024-04-09 NOTE — PROGRESS NOTES
Chief Complaint   Patient presents with    Medicare AWV       Medicare Annual Wellness Visit  Name: Janell Gaffney Today’s Date: 2024   MRN: [id number] Sex: Female   Age: 89 y.o. Ethnicity: Non- / Non    : 1935 Race: White (non-)      Janell Gaffney is here for Medicare AWV      Patient is 89-year-old female here for annual Medicare wellness. Patient came in with her daughter, Bita. Patient moved in to an Independent Living at [place] in 2023 and she is doing well.  Her daughter organizes her medications in a pillbox.  Patient denies any chest pains, shortness of breath, abdominal pain, melena or hematochezia.  No signs of bleeding from Eliquis.  Denies palpitations.  Reminded her that she is due for follow-up with her cardiologist for her A-fib.    Reviewed blood test done on 2023: Total cholesterol 151, triglyceride 144, HDL 62, LDL 60 (from 195), ALT 8, AST 15, bilirubin 0.5, WBC 7.3, hemoglobin 14.3, hematocrit 41.4, platelets 252.      Screenings for behavioral, psychosocial and functional/safety risks, and cognitive dysfunction are all negative except as indicated below. These results, as well as other patient data from the Health Risk Assessment form, are documented in Flowsheets linked to this Encounter.    Allergies   Allergen Reactions    Prednisone Anaphylaxis     Facial swelling and redness    Benzonatate Hives    Ciprofloxacin Swelling     Leg swelling    Clarithromycin Hives    Sulfa Antibiotics Rash, Itching and Hives          Current Outpatient Medications   Medication Sig Dispense Refill    metoprolol succinate (TOPROL XL) 50 MG extended release tablet TAKE ONE TABLET BY MOUTH DAILY 90 tablet 3    apixaban (ELIQUIS) 2.5 MG TABS tablet Take 1 tablet by mouth 2 times daily 180 tablet 3    dilTIAZem (CARDIZEM CD) 120 MG extended release capsule Take 1 capsule by mouth daily 90 capsule 3    lisinopril (PRINIVIL;ZESTRIL) 5 MG tablet TAKE ONE TABLET BY

## 2024-04-10 LAB
ALBUMIN SERPL-MCNC: 4.2 G/DL (ref 3.4–5)
ALBUMIN/GLOB SERPL: 1.8 {RATIO} (ref 1.1–2.2)
ALP SERPL-CCNC: 89 U/L (ref 40–129)
ALT SERPL-CCNC: 11 U/L (ref 10–40)
ANION GAP SERPL CALCULATED.3IONS-SCNC: 11 MMOL/L (ref 3–16)
AST SERPL-CCNC: 18 U/L (ref 15–37)
BILIRUB SERPL-MCNC: 0.4 MG/DL (ref 0–1)
BUN SERPL-MCNC: 15 MG/DL (ref 7–20)
CALCIUM SERPL-MCNC: 9.8 MG/DL (ref 8.3–10.6)
CHLORIDE SERPL-SCNC: 107 MMOL/L (ref 99–110)
CHOLEST SERPL-MCNC: 142 MG/DL (ref 0–199)
CO2 SERPL-SCNC: 26 MMOL/L (ref 21–32)
CREAT SERPL-MCNC: 0.9 MG/DL (ref 0.6–1.2)
GFR SERPLBLD CREATININE-BSD FMLA CKD-EPI: 61 ML/MIN/{1.73_M2}
GLUCOSE SERPL-MCNC: 88 MG/DL (ref 70–99)
HDLC SERPL-MCNC: 81 MG/DL (ref 40–60)
LDLC SERPL CALC-MCNC: 46 MG/DL
POTASSIUM SERPL-SCNC: 4.3 MMOL/L (ref 3.5–5.1)
PROT SERPL-MCNC: 6.5 G/DL (ref 6.4–8.2)
SODIUM SERPL-SCNC: 144 MMOL/L (ref 136–145)
TRIGL SERPL-MCNC: 76 MG/DL (ref 0–150)
TSH SERPL DL<=0.005 MIU/L-ACNC: 0.85 UIU/ML (ref 0.27–4.2)
VLDLC SERPL CALC-MCNC: 15 MG/DL

## 2024-07-23 ENCOUNTER — OFFICE VISIT (OUTPATIENT)
Dept: PRIMARY CARE CLINIC | Age: 89
End: 2024-07-23
Payer: MEDICARE

## 2024-07-23 VITALS
BODY MASS INDEX: 24.37 KG/M2 | OXYGEN SATURATION: 98 % | WEIGHT: 129 LBS | SYSTOLIC BLOOD PRESSURE: 124 MMHG | DIASTOLIC BLOOD PRESSURE: 80 MMHG | HEART RATE: 51 BPM

## 2024-07-23 DIAGNOSIS — R30.0 DYSURIA: ICD-10-CM

## 2024-07-23 DIAGNOSIS — I48.20 CHRONIC A-FIB (HCC): ICD-10-CM

## 2024-07-23 DIAGNOSIS — R41.3 MEMORY LOSS: Primary | ICD-10-CM

## 2024-07-23 LAB
BILIRUBIN, POC: NEGATIVE
BLOOD URINE, POC: NORMAL
CLARITY, POC: CLEAR
COLOR, POC: YELLOW
GLUCOSE URINE, POC: NEGATIVE
KETONES, POC: NEGATIVE
LEUKOCYTE EST, POC: NORMAL
NITRITE, POC: NEGATIVE
PH, POC: 6
PROTEIN, POC: NEGATIVE
SPECIFIC GRAVITY, POC: 1.01
UROBILINOGEN, POC: 0.2

## 2024-07-23 PROCEDURE — 1090F PRES/ABSN URINE INCON ASSESS: CPT | Performed by: FAMILY MEDICINE

## 2024-07-23 PROCEDURE — G8420 CALC BMI NORM PARAMETERS: HCPCS | Performed by: FAMILY MEDICINE

## 2024-07-23 PROCEDURE — 99213 OFFICE O/P EST LOW 20 MIN: CPT | Performed by: FAMILY MEDICINE

## 2024-07-23 PROCEDURE — 1123F ACP DISCUSS/DSCN MKR DOCD: CPT | Performed by: FAMILY MEDICINE

## 2024-07-23 PROCEDURE — G8427 DOCREV CUR MEDS BY ELIG CLIN: HCPCS | Performed by: FAMILY MEDICINE

## 2024-07-23 PROCEDURE — 81002 URINALYSIS NONAUTO W/O SCOPE: CPT | Performed by: FAMILY MEDICINE

## 2024-07-23 PROCEDURE — 1036F TOBACCO NON-USER: CPT | Performed by: FAMILY MEDICINE

## 2024-07-23 NOTE — PROGRESS NOTES
Chief Complaint   Patient presents with    Memory Loss     Daughter Bita states that he pt is good with current memory but does have short term memory difficulty. Also states that she has been seeing things that started about 1 month that lasted about a couple weeks    Dysuria     C/o burning with urination and painful urination x2 days    Migraine     Major migraine about 3 weeks ago       Subjective:       Janell Gaffney is a 89 y.o. female came in with her daughter for evaluation of possible memory loss.  Patient is a little stressed receiving the news that her  is now in memory care and under hospice care.  Patient also reports migraine right after the news.  Headache is not back so far.  Daughter mentioned forgetting certain appointments and thinking certain information has been given but forgot to inform her daughter.  She also mention that she is seeing people in her peripheral vision but people are not dead or not talking to her.    MMSE 30/30    Reviewed blood test done on 4/9/2024: TSH 0.85, WBC 6.9, hemoglobin 13.2, hematocrit 39.0, platelets 250, sodium 141, potassium 4.3, chloride 107, glucose 88, BUN 15, creatinine 0.9, EGFR 61, calcium 9.8, ALT 11, AST 18, total cholesterol 142, triglycerides 76, HDL 81, LDL 46    Patient developed A. fib after acute pancreatitis due to gallstone in July 2021. Had cholecystectomy and remained on A. fib. S/p DCCV on 9/10/2021 but only responded briefly. Goes to Dr. Khan for management. On Eliquis 2.5 mg twice a day for anticoagulation and metoprolol 100 mg daily and diltiazem 120 mg daily for rate control. [id number] Vasc score 6. Last visit with Dr. Khan was on 12/3/2021 and recommended 3 months follow-up.     Current Outpatient Medications   Medication Sig Dispense Refill    metoprolol succinate (TOPROL XL) 50 MG extended release tablet TAKE ONE TABLET BY MOUTH DAILY 90 tablet 3    apixaban (ELIQUIS) 2.5 MG TABS tablet Take 1 tablet by mouth 2 times daily 180

## 2024-07-26 DIAGNOSIS — E78.2 MIXED HYPERLIPIDEMIA: ICD-10-CM

## 2024-07-26 RX ORDER — ROSUVASTATIN CALCIUM 5 MG/1
TABLET, COATED ORAL
Qty: 90 TABLET | Refills: 3 | Status: SHIPPED | OUTPATIENT
Start: 2024-07-26

## 2024-07-26 NOTE — TELEPHONE ENCOUNTER
LastVisit 7/23/2024   LastLabs 07/23/2024   NextVisit 10/10/2024   LastRefilled 08/04/2023  Pharmacy:    [facility] [id number] - [place], [place] - [address] [phone] - F [phone]  [address]  [place] [address]  Phone: [phone] Fax: [phone]    [facility] - [place], [place] - [address] - P [phone] - F [phone]  [address] [facility] [address]  Phone: [phone] Fax: [phone]    [id number] - [place], [place] - [address] - P [phone] - F [phone]  [address]  [facility] [address]  Phone: [phone] Fax: [phone]     pharmacy confirmed in EPIC

## 2024-08-06 NOTE — PROGRESS NOTES
[facility]   Electrophysiology  Galindo Parsons, APRN-CNP  Attending EP: Dr. Khan    Date: 8/28/2024  I had the privilege of visiting Janell Gaffney in the office.     Chief Complaint:   Chief Complaint   Patient presents with    Follow-up     No cardiac symptoms at this time.     History of Present Illness: History obtained from patient and medical record.    Janell Gaffney is 89 y.o. female with a past medical history of HTN, HLD, TIA and AF. Hx of DCCV (9/24/21). She had recurrent AF and elected for rate control/anti-coagulation    -Interval history: Today, Janell Gaffney is being seen for routine follow up. Last seen in 2021. Her daughter is present for the visit. She is doing well. She remains in rate controlled atrial fibrillation/flutter. No issues with falling. She lives at a nursing home in independent living as she had to put her spouse in a facility due to his worsening mental state.     Denies having chest pain, palpitations, shortness of breath, orthopnea/PND, cough, or dizziness at the time of this visit.    With regard to medication therapy the patient has been compliant with prescribed regimen. They have tolerated therapy to date.     Allergies:  Allergies   Allergen Reactions    Prednisone Anaphylaxis     Facial swelling and redness    Benzonatate Hives    Ciprofloxacin Swelling     Leg swelling    Clarithromycin Hives    Sulfa Antibiotics Rash, Itching and Hives     Home Medications:  Prior to Visit Medications    Medication Sig Taking? Authorizing Provider   rosuvastatin (CRESTOR) 5 MG tablet TAKE ONE TABLET BY MOUTH ONCE NIGHTLY Yes Reyes, Eleia J, MD   metoprolol succinate (TOPROL XL) 50 MG extended release tablet TAKE ONE TABLET BY MOUTH DAILY Yes Reyes, Eleia J, MD   apixaban (ELIQUIS) 2.5 MG TABS tablet Take 1 tablet by mouth 2 times daily Yes Reyes, Eleia J, MD   dilTIAZem (CARDIZEM CD) 120 MG extended release capsule Take 1 capsule by mouth daily Yes Reyes, Eleia J, MD   lisinopril

## 2024-08-28 ENCOUNTER — TELEPHONE (OUTPATIENT)
Dept: PRIMARY CARE CLINIC | Age: 89
End: 2024-08-28

## 2024-08-28 ENCOUNTER — OFFICE VISIT (OUTPATIENT)
Dept: CARDIOLOGY CLINIC | Age: 89
End: 2024-08-28
Payer: MEDICARE

## 2024-08-28 VITALS
WEIGHT: 129 LBS | DIASTOLIC BLOOD PRESSURE: 66 MMHG | HEIGHT: 61 IN | BODY MASS INDEX: 24.35 KG/M2 | OXYGEN SATURATION: 96 % | SYSTOLIC BLOOD PRESSURE: 104 MMHG | HEART RATE: 85 BPM

## 2024-08-28 DIAGNOSIS — I10 ESSENTIAL HYPERTENSION, BENIGN: ICD-10-CM

## 2024-08-28 DIAGNOSIS — R41.3 MEMORY LOSS: Primary | ICD-10-CM

## 2024-08-28 DIAGNOSIS — I48.20 CHRONIC A-FIB (HCC): Primary | ICD-10-CM

## 2024-08-28 DIAGNOSIS — E78.2 MIXED HYPERLIPIDEMIA: ICD-10-CM

## 2024-08-28 PROCEDURE — 1123F ACP DISCUSS/DSCN MKR DOCD: CPT | Performed by: NURSE PRACTITIONER

## 2024-08-28 PROCEDURE — 93000 ELECTROCARDIOGRAM COMPLETE: CPT | Performed by: NURSE PRACTITIONER

## 2024-08-28 PROCEDURE — G8427 DOCREV CUR MEDS BY ELIG CLIN: HCPCS | Performed by: NURSE PRACTITIONER

## 2024-08-28 PROCEDURE — G8420 CALC BMI NORM PARAMETERS: HCPCS | Performed by: NURSE PRACTITIONER

## 2024-08-28 PROCEDURE — 1090F PRES/ABSN URINE INCON ASSESS: CPT | Performed by: NURSE PRACTITIONER

## 2024-08-28 PROCEDURE — 99214 OFFICE O/P EST MOD 30 MIN: CPT | Performed by: NURSE PRACTITIONER

## 2024-08-28 PROCEDURE — 1036F TOBACCO NON-USER: CPT | Performed by: NURSE PRACTITIONER

## 2024-08-28 NOTE — TELEPHONE ENCOUNTER
Pt's daughter states migraines again 6-8 months and short term memory loss. Daughter thinks the questions that were asked in the questions in office aren't what she is seeing at home.

## 2024-08-28 NOTE — TELEPHONE ENCOUNTER
Pt's daughter Bita calling requesting referral to see  Neurologist with [facility] . Per Bita pt was seeing by see  back in 2020 and per specialist office new referral is needed -as a new patient.

## 2024-08-28 NOTE — TELEPHONE ENCOUNTER
Will process referral to see Dr. Rick Garnett neurologist at [facility].  Have them call his office to make the appointment.

## 2024-10-15 ENCOUNTER — OFFICE VISIT (OUTPATIENT)
Dept: PRIMARY CARE CLINIC | Age: 89
End: 2024-10-15

## 2024-10-15 VITALS
DIASTOLIC BLOOD PRESSURE: 96 MMHG | OXYGEN SATURATION: 100 % | WEIGHT: 132 LBS | SYSTOLIC BLOOD PRESSURE: 148 MMHG | RESPIRATION RATE: 16 BRPM | HEIGHT: 61 IN | BODY MASS INDEX: 24.92 KG/M2 | HEART RATE: 91 BPM

## 2024-10-15 DIAGNOSIS — I10 ESSENTIAL HYPERTENSION, BENIGN: Primary | ICD-10-CM

## 2024-10-15 DIAGNOSIS — Z23 FLU VACCINE NEED: ICD-10-CM

## 2024-10-15 PROBLEM — Z86.73 H/O: STROKE: Status: ACTIVE | Noted: 2020-06-17

## 2024-10-15 SDOH — ECONOMIC STABILITY: FOOD INSECURITY: WITHIN THE PAST 12 MONTHS, YOU WORRIED THAT YOUR FOOD WOULD RUN OUT BEFORE YOU GOT MONEY TO BUY MORE.: NEVER TRUE

## 2024-10-15 SDOH — ECONOMIC STABILITY: INCOME INSECURITY: HOW HARD IS IT FOR YOU TO PAY FOR THE VERY BASICS LIKE FOOD, HOUSING, MEDICAL CARE, AND HEATING?: NOT HARD AT ALL

## 2024-10-15 SDOH — ECONOMIC STABILITY: FOOD INSECURITY: WITHIN THE PAST 12 MONTHS, THE FOOD YOU BOUGHT JUST DIDN'T LAST AND YOU DIDN'T HAVE MONEY TO GET MORE.: NEVER TRUE

## 2024-10-15 NOTE — PROGRESS NOTES
visit.      Allergies   Allergen Reactions    Prednisone Anaphylaxis     Facial swelling and redness    Benzonatate Hives    Ciprofloxacin Swelling     Leg swelling    Clarithromycin Hives    Sulfa Antibiotics Rash, Itching and Hives       Objective:   BP (!) 148/96   Pulse 91   Resp 16   Ht 1.547 m (5' 0.9\")   Wt 59.9 kg (132 lb)   SpO2 100%   BMI 25.02 kg/m²   Alert oriented, NAD, ambulatory  HEENT: unremarkable  Chest/Lungs: clear to ausculation, no wheezing/rales, mild sternal tenderness  Heart: Irregularly irregular but heart rate is controlled, normal S1S2, no murmur  Extremities: good ROM, good pulses, trace edema, has spider veins veins.  Neurologic: grossly normal, DTR 2+ bilateral      Assessment/Plan:   1. Essential hypertension, benign  Blood pressure slightly elevated 140/96 and patient may have not taken her medication this morning.  Her daughter will make sure she takes her medication.  She has a pill organizer.  Possibly might moved to a assisted living from independent living where she is at if she continues to keep forgetting to take her medication.  Continue diltiazem 120 mg daily, lisinopril 5 mg twice a day and Toprol-XL 50 mg daily.    2. Flu vaccine need  VIS given.  No history of immunization reaction.    - Influenza, FLUAD Trivalent, (age 65 y+), IM, Preservative Free, 0.5mL    3.  Her daughter still concerned about her memory, advised to make an appointment to see the neurologist for evaluation.    4.  Chronic A-fib, controlled heart rate  On Eliquis for anticoagulation.  Continue diltiazem and Toprol-XL.  Was taken off aspirin.  Continue to follow-up with a cardiologist.    5.  Hyperlipidemia, on Crestor 5 mg at night.    6.  Urinary incontinence, stable on Vesicare 10 mg daily.    Return in about 6 months (around 4/11/2025) for Medicare AWV/HTN/fasting blood test.     Electronically Signed: Electronically signed by Eleia J Reyes, MD on 10/15/2024 at 1:01 PM EDT     This dictation

## 2025-02-21 DIAGNOSIS — I10 ESSENTIAL HYPERTENSION, BENIGN: ICD-10-CM

## 2025-02-21 RX ORDER — LISINOPRIL 5 MG/1
5 TABLET ORAL 2 TIMES DAILY
Qty: 180 TABLET | Refills: 3 | Status: SHIPPED | OUTPATIENT
Start: 2025-02-21

## 2025-02-21 NOTE — TELEPHONE ENCOUNTER
Recent Visits  Date Type Provider Dept   10/15/24 Office Visit Reyes, Eleia J, MD [facility]   07/23/24 Office Visit Reyes, Eleia J, MD [facility]   04/09/24 Office Visit Reyes, Eleia J, MD [facility]   12/07/23 Office Visit Reyes, Eleia J, MD [facility]   10/04/23 Office Visit Reyes, Eleia J, MD [facility]   Showing recent visits within past 540 days with a meds authorizing provider and meeting all other requirements  Future Appointments  Date Type Provider Dept   04/15/25 Appointment Reyes, Eleia J, MD [facility]   Showing future appointments within next 150 days with a meds authorizing provider and meeting all other requirements

## 2025-03-03 DIAGNOSIS — I10 ESSENTIAL HYPERTENSION, BENIGN: ICD-10-CM

## 2025-03-03 DIAGNOSIS — I48.19 PERSISTENT ATRIAL FIBRILLATION (HCC): ICD-10-CM

## 2025-03-04 RX ORDER — DILTIAZEM HYDROCHLORIDE 120 MG/1
120 CAPSULE, COATED, EXTENDED RELEASE ORAL DAILY
Qty: 90 CAPSULE | Refills: 3 | Status: SHIPPED | OUTPATIENT
Start: 2025-03-04

## 2025-03-04 NOTE — TELEPHONE ENCOUNTER
Recent Visits  Date Type Provider Dept   10/15/24 Office Visit Reyes, Eleia J, MD [facility]   07/23/24 Office Visit Reyes, Eleia J, MD [facility]   04/09/24 Office Visit Reyes, Eleia J, MD [facility]   12/07/23 Office Visit Reyes, Eleia J, MD [facility]   10/04/23 Office Visit Reyes, Eleia J, MD [facility]   Showing recent visits within past 540 days with a meds authorizing provider and meeting all other requirements  Future Appointments  Date Type Provider Dept   04/15/25 Appointment Reyes, Eleia J, MD [facility]   Showing future appointments within next 150 days with a meds authorizing provider and meeting all other requirements

## 2025-03-11 ENCOUNTER — TELEPHONE (OUTPATIENT)
Dept: PRIMARY CARE CLINIC | Age: 89
End: 2025-03-11

## 2025-03-11 DIAGNOSIS — I48.20 CHRONIC ATRIAL FIBRILLATION (HCC): ICD-10-CM

## 2025-03-11 NOTE — TELEPHONE ENCOUNTER
Patient's daughter called and stated that pt has flu like symptoms.  She started this weekend with a cough, weakness and feels tired.  They declined an apt because the patient is too weak to come in.  They requested for medication to be sent to the pharmacy.    [facility] [id number] - [place], [place] - [facility] [address] [phone] -

## 2025-03-11 NOTE — TELEPHONE ENCOUNTER
Pts daughter calling to advise pt tested positive for COVID per at home test. Requesting RX be sent to [address]

## 2025-03-12 DIAGNOSIS — Z20.828 EXPOSURE TO THE FLU: Primary | ICD-10-CM

## 2025-03-12 RX ORDER — OSELTAMIVIR PHOSPHATE 75 MG/1
75 CAPSULE ORAL 2 TIMES DAILY
Qty: 10 CAPSULE | Refills: 0 | Status: SHIPPED | OUTPATIENT
Start: 2025-03-12 | End: 2025-03-17

## 2025-03-14 RX ORDER — APIXABAN 2.5 MG/1
2.5 TABLET, FILM COATED ORAL 2 TIMES DAILY
Qty: 180 TABLET | Refills: 3 | Status: SHIPPED | OUTPATIENT
Start: 2025-03-14

## 2025-03-14 NOTE — TELEPHONE ENCOUNTER
Recent Visits  Date Type Provider Dept   10/15/24 Office Visit Reyes, Eleia J, MD [facility]   07/23/24 Office Visit Reyes, Eleia J, MD [facility]   04/09/24 Office Visit Reyes, Eleia J, MD [facility]   12/07/23 Office Visit Reyes, Eleia J, MD [facility]   10/04/23 Office Visit Reyes, Eleia J, MD [facility]   Showing recent visits within past 540 days with a meds authorizing provider and meeting all other requirements  Future Appointments  Date Type Provider Dept   04/15/25 Appointment Reyes, Eleia J, MD [facility]   Showing future appointments within next 150 days with a meds authorizing provider and meeting all other requirements

## 2025-04-01 DIAGNOSIS — I10 ESSENTIAL HYPERTENSION, BENIGN: ICD-10-CM

## 2025-04-01 DIAGNOSIS — N39.41 URGE INCONTINENCE: ICD-10-CM

## 2025-04-01 RX ORDER — METOPROLOL SUCCINATE 50 MG/1
50 TABLET, EXTENDED RELEASE ORAL DAILY
Qty: 90 TABLET | Refills: 3 | Status: SHIPPED | OUTPATIENT
Start: 2025-04-01

## 2025-04-01 RX ORDER — SOLIFENACIN SUCCINATE 10 MG/1
10 TABLET, FILM COATED ORAL DAILY
Qty: 90 TABLET | Refills: 3 | Status: SHIPPED | OUTPATIENT
Start: 2025-04-01

## 2025-04-01 NOTE — TELEPHONE ENCOUNTER
Recent Visits  Date Type Provider Dept   10/15/24 Office Visit Reyes, Eleia J, MD [facility]   07/23/24 Office Visit Reyes, Eleia J, MD [facility]   04/09/24 Office Visit Reyes, Eleia J, MD [facility]   12/07/23 Office Visit Reyes, Eleia J, MD [facility]   Showing recent visits within past 540 days with a meds authorizing provider and meeting all other requirements  Future Appointments  Date Type Provider Dept   04/15/25 Appointment Reyes, Eleia J, MD [facility]   Showing future appointments within next 150 days with a meds authorizing provider and meeting all other requirements

## 2025-04-04 NOTE — H&P
Onset: 4/4/25       Location / description: Lab Results Follow Up, Chronic Pain, Vaginal Irritation     Precipitating Factors: Pt requesting follow up to recent lab work. Pt states would like to discuss results.     Pt endorses that she is having ongoing total body joint pain, spanning entire body. Pt is standing/walking w/ walker. Pt endorses ongoing numbness/tingling to hands, no new changes.     Pt endorses having ongoing vaginal irritation, odor w/ yeast infection. Pt did take prescription fluconazole. Pt denies vaginal discharge. Endorses slight itching. Denies pain/burning w/ urination. Denies blood/pink tinge to urine. +intermitt mild pelvic discomfort.     Pain Scale (1-10), 10 highest: 6-10/10, pt states is having ongoing total body joint pain, ongoing but worse in last few days.     What improves/worsens symptoms:   Regarding vaginal sx, pt has changed to water instead of soap, softer towels.   Regarding joint pain, tylenol, heat help.     Symptom specific medications: Pt taking all medications as prescribed. OTC tylenol as needed    Recent visits (last 3-4 weeks) for same reason or recent surgery:  Last PCP visit 3/26/25    PLAN:    Provider's office  See Provider within 24 hour    Patient/Caller agrees to follow recommendations. and Patient/Caller encouraged to call back if any questions or concerns.    Pt will go to [facility] tomorrow w/ increased pain, increased pelvic pain.      Priority message to PCP office for follow up.     Reason for Disposition   Caller requesting routine or non-urgent lab result   Diabetes mellitus or weak immune system (e.g., HIV positive, cancer chemo, splenectomy, organ transplant, chronic steroids)   Itching or rash of female genital area (e.g., labia, vagina, vulva)   [1] Symptoms of a \"yeast infection\" (i.e., itchy, white discharge, not bad smelling) AND [2] not improved > 3 days following Care Advice    Protocols used: PCP Call - No Triage-A-AH, Muscle Aches and  Body Pain-A-AH, Vaginal Symptoms-A-AH, Vulvar Symptoms-A-AH     Germaine Gutierrez   Phone [phone]   PROTIME-INR    Narrative:     Performed at:  [facility]  [address]   Phone [phone]   URINE RT REFLEX TO CULTURE   POCT GLUCOSE    Narrative:     Performed at:  [facility]  [address], [place]   Phone [phone]         IMAGING:  Imaging results from the ER have been reviewed in the computerized chart. Ct Head Wo Contrast    Result Date: 6/17/2020  EXAMINATION: CT OF THE HEAD WITHOUT CONTRAST  6/17/2020 5:52 pm TECHNIQUE: CT of the head was performed without the administration of intravenous contrast. Dose modulation, iterative reconstruction, and/or weight based adjustment of the mA/kV was utilized to reduce the radiation dose to as low as reasonably achievable. COMPARISON: June 2013 HISTORY: ORDERING SYSTEM PROVIDED HISTORY: R numbness, visual changes TECHNOLOGIST PROVIDED HISTORY: Reason for exam:->R numbness, visual changes Has a \"code stroke\" or \"stroke alert\" been called? ->No Reason for Exam: R numbness, visual changes Acuity: Acute Type of Exam: Initial FINDINGS: BRAIN/VENTRICLES: There is no acute intracranial hemorrhage, mass effect or midline shift. No abnormal extra-axial fluid collection. The gray-white differentiation is maintained without evidence of an acute infarct. There is no evidence of hydrocephalus. Central atrophy and chronic white matter disease are similar to the prior study. ORBITS: The visualized portion of the orbits demonstrate no acute abnormality. SINUSES: The visualized paranasal sinuses and mastoid air cells demonstrate no acute abnormality. SOFT TISSUES/SKULL:  No acute abnormality of the visualized skull or soft tissues. No acute intracranial abnormality. Findings were discussed with Gena GUAMAN at 6:15 pm on 6/17/2020.      Xr Chest Portable    Result Date: 6/17/2020  EXAMINATION: ONE XRAY VIEW OF

## 2025-05-10 ENCOUNTER — APPOINTMENT (OUTPATIENT)
Dept: CT IMAGING | Age: 89
End: 2025-05-10
Payer: MEDICARE

## 2025-05-10 ENCOUNTER — HOSPITAL ENCOUNTER (EMERGENCY)
Age: 89
Discharge: HOME | End: 2025-05-10
Attending: EMERGENCY MEDICINE
Payer: MEDICARE

## 2025-05-10 VITALS
RESPIRATION RATE: 16 BRPM | HEIGHT: 62 IN | HEART RATE: 94 BPM | SYSTOLIC BLOOD PRESSURE: 186 MMHG | DIASTOLIC BLOOD PRESSURE: 120 MMHG | TEMPERATURE: 97.6 F | OXYGEN SATURATION: 95 % | BODY MASS INDEX: 22.62 KG/M2 | WEIGHT: 122.9 LBS

## 2025-05-10 DIAGNOSIS — N30.01 ACUTE CYSTITIS WITH HEMATURIA: Primary | ICD-10-CM

## 2025-05-10 LAB
ALBUMIN SERPL-MCNC: 3.9 G/DL (ref 3.4–5)
ALBUMIN/GLOB SERPL: 1.4 {RATIO} (ref 1.1–2.2)
ALP SERPL-CCNC: 102 U/L (ref 40–129)
ALT SERPL-CCNC: 6 U/L (ref 10–40)
ANION GAP SERPL CALCULATED.3IONS-SCNC: 11 MMOL/L (ref 3–16)
APTT BLD: 25.4 SEC (ref 22.1–36.4)
AST SERPL-CCNC: 20 U/L (ref 15–37)
BACTERIA URNS QL MICRO: ABNORMAL /HPF
BASOPHILS # BLD: 0.1 K/UL (ref 0–0.2)
BASOPHILS NFR BLD: 1.5 %
BILIRUB SERPL-MCNC: 0.6 MG/DL (ref 0–1)
BILIRUB UR QL STRIP.AUTO: NEGATIVE
BUN SERPL-MCNC: 15 MG/DL (ref 7–20)
CALCIUM SERPL-MCNC: 9.6 MG/DL (ref 8.3–10.6)
CHLORIDE SERPL-SCNC: 104 MMOL/L (ref 99–110)
CLARITY UR: CLEAR
CO2 SERPL-SCNC: 21 MMOL/L (ref 21–32)
COLOR UR: YELLOW
CREAT SERPL-MCNC: 0.8 MG/DL (ref 0.6–1.2)
DEPRECATED RDW RBC AUTO: 14.5 % (ref 12.4–15.4)
EOSINOPHIL # BLD: 0.2 K/UL (ref 0–0.6)
EOSINOPHIL NFR BLD: 3.1 %
EPI CELLS #/AREA URNS AUTO: 1 /HPF (ref 0–5)
GFR SERPLBLD CREATININE-BSD FMLA CKD-EPI: 70 ML/MIN/{1.73_M2}
GLUCOSE SERPL-MCNC: 104 MG/DL (ref 70–99)
GLUCOSE UR STRIP.AUTO-MCNC: NEGATIVE MG/DL
HCT VFR BLD AUTO: 42.1 % (ref 36–48)
HGB BLD-MCNC: 14.3 G/DL (ref 12–16)
HGB UR QL STRIP.AUTO: ABNORMAL
HYALINE CASTS #/AREA URNS AUTO: 0 /LPF (ref 0–8)
INR PPP: 1.04 (ref 0.85–1.15)
KETONES UR STRIP.AUTO-MCNC: NEGATIVE MG/DL
LEUKOCYTE ESTERASE UR QL STRIP.AUTO: ABNORMAL
LYMPHOCYTES # BLD: 1.6 K/UL (ref 1–5.1)
LYMPHOCYTES NFR BLD: 21 %
MCH RBC QN AUTO: 30.1 PG (ref 26–34)
MCHC RBC AUTO-ENTMCNC: 34.1 G/DL (ref 31–36)
MCV RBC AUTO: 88.4 FL (ref 80–100)
MONOCYTES # BLD: 0.7 K/UL (ref 0–1.3)
MONOCYTES NFR BLD: 8.9 %
NEUTROPHILS # BLD: 5 K/UL (ref 1.7–7.7)
NEUTROPHILS NFR BLD: 65.5 %
NITRITE UR QL STRIP.AUTO: NEGATIVE
PH UR STRIP.AUTO: 7.5 [PH] (ref 5–8)
PLATELET # BLD AUTO: 215 K/UL (ref 135–450)
PMV BLD AUTO: 10.3 FL (ref 5–10.5)
POTASSIUM SERPL-SCNC: 4.3 MMOL/L (ref 3.5–5.1)
PROT SERPL-MCNC: 6.7 G/DL (ref 6.4–8.2)
PROT UR STRIP.AUTO-MCNC: 30 MG/DL
PROTHROMBIN TIME: 13.8 SEC (ref 11.9–14.9)
RBC # BLD AUTO: 4.76 M/UL (ref 4–5.2)
RBC CLUMPS #/AREA URNS AUTO: 434 /HPF (ref 0–4)
REASON FOR REJECTION: NORMAL
REASON FOR REJECTION: NORMAL
REJECTED TEST: NORMAL
REJECTED TEST: NORMAL
SODIUM SERPL-SCNC: 136 MMOL/L (ref 136–145)
SP GR UR STRIP.AUTO: <=1.005 (ref 1–1.03)
UA COMPLETE W REFLEX CULTURE PNL UR: YES
UA DIPSTICK W REFLEX MICRO PNL UR: YES
URN SPEC COLLECT METH UR: ABNORMAL
UROBILINOGEN UR STRIP-ACNC: 0.2 E.U./DL
WBC # BLD AUTO: 7.7 K/UL (ref 4–11)
WBC #/AREA URNS AUTO: 21 /HPF (ref 0–5)

## 2025-05-10 PROCEDURE — 80053 COMPREHEN METABOLIC PANEL: CPT

## 2025-05-10 PROCEDURE — 85730 THROMBOPLASTIN TIME PARTIAL: CPT

## 2025-05-10 PROCEDURE — 81001 URINALYSIS AUTO W/SCOPE: CPT

## 2025-05-10 PROCEDURE — 85025 COMPLETE CBC W/AUTO DIFF WBC: CPT

## 2025-05-10 PROCEDURE — 36415 COLL VENOUS BLD VENIPUNCTURE: CPT

## 2025-05-10 PROCEDURE — 87086 URINE CULTURE/COLONY COUNT: CPT

## 2025-05-10 PROCEDURE — 99283 EMERGENCY DEPT VISIT LOW MDM: CPT

## 2025-05-10 PROCEDURE — 85610 PROTHROMBIN TIME: CPT

## 2025-05-10 RX ORDER — CEFUROXIME AXETIL 500 MG/1
500 TABLET ORAL 2 TIMES DAILY
Qty: 14 TABLET | Refills: 0 | Status: SHIPPED | OUTPATIENT
Start: 2025-05-10 | End: 2025-05-14

## 2025-05-10 ASSESSMENT — PAIN - FUNCTIONAL ASSESSMENT: PAIN_FUNCTIONAL_ASSESSMENT: NONE - DENIES PAIN

## 2025-05-10 NOTE — ED PROVIDER NOTES
[facility] EMERGENCY DEPARTMENT  EMERGENCY DEPARTMENT ENCOUNTER        Pt Name: Janell Gaffney  MRN: [id number]  Birthdate 3/11/1935  Date of evaluation: 5/10/2025  Provider: Timmy Moran PA-C  PCP: Reyes, Eleia J, MD  Note Started: 11:48 AM EDT 5/10/25       I have seen and evaluated this patient with my supervising physician Galindo Sheriff MD.      CHIEF COMPLAINT       Chief Complaint   Patient presents with    Hematuria     Pt is on eliquis, reports \"brown urine\" starting last night, denies any pain.        HISTORY OF PRESENT ILLNESS: 1 or more Elements     History From: patient  Limitations to history : None    Janell Gaffney is a 89 y.o. female who presents to the emergency department with a chief complaint of some brown urine with urinary frequency and urgency that began last night.  Anticoagulated on Eliquis.  Denies flank pain, abdominal pain or any pain at all.  Denies nausea, vomiting, fevers, diarrhea, bloody stool or any other symptoms.    Nursing Notes were all reviewed and agreed with or any disagreements were addressed in the HPI.    REVIEW OF SYSTEMS :      Review of Systems    Positives and Pertinent negatives as per HPI.     SURGICAL HISTORY     Past Surgical History:   Procedure Laterality Date    CARDIOVERSION  09/10/2021    DCCV cardioversion due to A. fib by Dr. Funez    CATARACT REMOVAL WITH IMPLANT Bilateral 03/21/2012    By Dr. Yepez    CHOLECYSTECTOMY, LAPAROSCOPIC N/A 07/20/2021    LAPAROSCOPIC CHOLECYSTECTOMY WITH INTRAOPERATIVE CHOLANGIOGRAM performed by David Ramires MD at [facility] OR    COLONOSCOPY      BX OR SECAL POLYP    CYSTOSCOPY  10/26/2010    With dilatation/left ureteroscopy due to stricture    EXCISION OF PARATHYROID MASS  05/11/2017    EXCISION OF LEFT PARATHYROID ADENOMA WITH FROZEN SECTION    HEMORRHOID SURGERY  07/05/2000    By Dr. Toney    JOINT REPLACEMENT Right 10/31/2016    Right TKA by Dr. Canales    KIDNEY STONE SURGERY      SKIN CANCER

## 2025-05-10 NOTE — ED PROVIDER NOTES
I have personally performed a face to face diagnostic evaluation on this patient. I have fully participated in the care of this patient I personally saw the patient and performed a substantive portion of the visit including all aspects of the medical decision making.  I have reviewed and agree with all pertinent clinical information including history, physical exam, diagnostic tests, and the plan.      HPI: Janell Gaffney presented with \"dark urine\".  Started last night.  No specific pain.  No blood in urine not passing clots.  No lower abdominal pain no fevers chills nausea or vomiting does have a history of urinary tract infections but states that \"it has been years\".  Is on a blood thinner.  No other associated symptoms besides urinary frequency.  Chief Complaint   Patient presents with    Hematuria     Pt is on eliquis, reports \"brown urine\" starting last night, denies any pain.       Review of Systems: See JUNITO note  Vital Signs: BP (!) 186/120   Pulse 94   Temp 97.6 °F (36.4 °C) (Oral)   Resp 16   Ht 1.575 m (5' 2\")   Wt 55.7 kg (122 lb 14.4 oz)   SpO2 95%   BMI 22.48 kg/m²     Alert 89 y.o. female who does not appear toxic or acutely ill  HENT: Atraumatic, oral mucosa moist  Neck: Grossly normal ROM  Chest/Lungs: respiratory effort normal   Abdomen: soft nontender  Musculoskeletal: Grossly normal ROM  Skin: No palor or diaphoresis    Medical Decision Making and Plan:  Pertinent Labs & Imaging studies reviewed. (See JUNITO chart for details)  I agree with JUNITO assessment and plan.  89-year-old female presents for dark urine.  No pain.  But significant urinary frequency.  Patient is afebrile nontachycardic saturating well on room air.  Found to have significant cystitis with hematuria.  Patient does not have any urinary obstruction or retention.  No indication for CT scan.  Will treat with Ceftin.  Will start with Ceftin twice daily for 7 days.  Patient is not septic.  Rest of laboratory workup is otherwise

## 2025-05-11 LAB — BACTERIA UR CULT: NORMAL

## 2025-05-14 ENCOUNTER — OFFICE VISIT (OUTPATIENT)
Dept: PRIMARY CARE CLINIC | Age: 89
End: 2025-05-14

## 2025-05-14 ENCOUNTER — APPOINTMENT (OUTPATIENT)
Dept: CT IMAGING | Age: 89
End: 2025-05-14
Payer: MEDICARE

## 2025-05-14 ENCOUNTER — HOSPITAL ENCOUNTER (EMERGENCY)
Age: 89
Discharge: HOME | End: 2025-05-14
Attending: EMERGENCY MEDICINE
Payer: MEDICARE

## 2025-05-14 VITALS
HEIGHT: 62 IN | SYSTOLIC BLOOD PRESSURE: 134 MMHG | HEART RATE: 102 BPM | DIASTOLIC BLOOD PRESSURE: 86 MMHG | BODY MASS INDEX: 22.63 KG/M2 | TEMPERATURE: 97.9 F | OXYGEN SATURATION: 96 % | RESPIRATION RATE: 16 BRPM | WEIGHT: 123 LBS

## 2025-05-14 VITALS
HEIGHT: 63 IN | SYSTOLIC BLOOD PRESSURE: 128 MMHG | HEART RATE: 97 BPM | RESPIRATION RATE: 14 BRPM | DIASTOLIC BLOOD PRESSURE: 108 MMHG | BODY MASS INDEX: 22.18 KG/M2 | OXYGEN SATURATION: 98 % | TEMPERATURE: 98.2 F | WEIGHT: 125.2 LBS

## 2025-05-14 DIAGNOSIS — I48.91 ATRIAL FIBRILLATION, UNSPECIFIED TYPE (HCC): ICD-10-CM

## 2025-05-14 DIAGNOSIS — R31.9 HEMATURIA, UNSPECIFIED TYPE: Primary | ICD-10-CM

## 2025-05-14 DIAGNOSIS — R31.9 HEMATURIA, UNSPECIFIED TYPE: ICD-10-CM

## 2025-05-14 DIAGNOSIS — Z09 HOSPITAL DISCHARGE FOLLOW-UP: Primary | ICD-10-CM

## 2025-05-14 DIAGNOSIS — Z79.01 ANTICOAGULATED BY ANTICOAGULATION TREATMENT: ICD-10-CM

## 2025-05-14 LAB
ALBUMIN SERPL-MCNC: 4.1 G/DL (ref 3.4–5)
ALBUMIN/GLOB SERPL: 1.5 {RATIO} (ref 1.1–2.2)
ALP SERPL-CCNC: 101 U/L (ref 40–129)
ALT SERPL-CCNC: 9 U/L (ref 10–40)
ANION GAP SERPL CALCULATED.3IONS-SCNC: 9 MMOL/L (ref 3–16)
AST SERPL-CCNC: 23 U/L (ref 15–37)
BACTERIA URNS QL MICRO: ABNORMAL /HPF
BASOPHILS # BLD: 0.1 K/UL (ref 0–0.2)
BASOPHILS NFR BLD: 1.2 %
BILIRUB SERPL-MCNC: 0.6 MG/DL (ref 0–1)
BILIRUB UR QL STRIP.AUTO: NEGATIVE
BILIRUBIN, POC: ABNORMAL
BLOOD URINE, POC: ABNORMAL
BUN SERPL-MCNC: 14 MG/DL (ref 7–20)
CALCIUM SERPL-MCNC: 9.9 MG/DL (ref 8.3–10.6)
CHLORIDE SERPL-SCNC: 104 MMOL/L (ref 99–110)
CK SERPL-CCNC: 45 U/L (ref 26–192)
CLARITY UR: CLEAR
CLARITY, POC: ABNORMAL
CO2 SERPL-SCNC: 26 MMOL/L (ref 21–32)
COLOR UR: YELLOW
COLOR, POC: ABNORMAL
CREAT SERPL-MCNC: 0.8 MG/DL (ref 0.6–1.2)
DEPRECATED RDW RBC AUTO: 14.4 % (ref 12.4–15.4)
EKG DIAGNOSIS: NORMAL
EKG Q-T INTERVAL: 362 MS
EKG QRS DURATION: 80 MS
EKG QTC CALCULATION (BAZETT): 478 MS
EKG R AXIS: 0 DEGREES
EKG T AXIS: 92 DEGREES
EKG VENTRICULAR RATE: 105 BPM
EOSINOPHIL # BLD: 0.1 K/UL (ref 0–0.6)
EOSINOPHIL NFR BLD: 1.4 %
EPI CELLS #/AREA URNS AUTO: 1 /HPF (ref 0–5)
GFR SERPLBLD CREATININE-BSD FMLA CKD-EPI: 70 ML/MIN/{1.73_M2}
GLUCOSE SERPL-MCNC: 120 MG/DL (ref 70–99)
GLUCOSE UR STRIP.AUTO-MCNC: NEGATIVE MG/DL
GLUCOSE URINE, POC: NEGATIVE MG/DL
HCT VFR BLD AUTO: 41 % (ref 36–48)
HGB BLD-MCNC: 13.9 G/DL (ref 12–16)
HGB UR QL STRIP.AUTO: ABNORMAL
HYALINE CASTS #/AREA URNS AUTO: 0 /LPF (ref 0–8)
KETONES UR STRIP.AUTO-MCNC: NEGATIVE MG/DL
KETONES, POC: NEGATIVE MG/DL
LEUKOCYTE EST, POC: NEGATIVE
LEUKOCYTE ESTERASE UR QL STRIP.AUTO: ABNORMAL
LIPASE SERPL-CCNC: 102 U/L (ref 13–60)
LYMPHOCYTES # BLD: 1.4 K/UL (ref 1–5.1)
LYMPHOCYTES NFR BLD: 18.3 %
MAGNESIUM SERPL-MCNC: 1.94 MG/DL (ref 1.8–2.4)
MCH RBC QN AUTO: 29.8 PG (ref 26–34)
MCHC RBC AUTO-ENTMCNC: 33.8 G/DL (ref 31–36)
MCV RBC AUTO: 88 FL (ref 80–100)
MONOCYTES # BLD: 0.6 K/UL (ref 0–1.3)
MONOCYTES NFR BLD: 8.6 %
NEUTROPHILS # BLD: 5.3 K/UL (ref 1.7–7.7)
NEUTROPHILS NFR BLD: 70.5 %
NITRITE UR QL STRIP.AUTO: NEGATIVE
NITRITE, POC: NEGATIVE
PH UR STRIP.AUTO: 6.5 [PH] (ref 5–8)
PH, POC: 6
PLATELET # BLD AUTO: 272 K/UL (ref 135–450)
PMV BLD AUTO: 8.7 FL (ref 5–10.5)
POTASSIUM SERPL-SCNC: 4.2 MMOL/L (ref 3.5–5.1)
PROT SERPL-MCNC: 6.8 G/DL (ref 6.4–8.2)
PROT UR STRIP.AUTO-MCNC: 30 MG/DL
PROTEIN, POC: >=300 MG/DL
RBC # BLD AUTO: 4.65 M/UL (ref 4–5.2)
RBC CLUMPS #/AREA URNS AUTO: 122 /HPF (ref 0–4)
REASON FOR REJECTION: NORMAL
REJECTED TEST: NORMAL
SODIUM SERPL-SCNC: 139 MMOL/L (ref 136–145)
SP GR UR STRIP.AUTO: <=1.005 (ref 1–1.03)
SPECIFIC GRAVITY, POC: 1.01
UA COMPLETE W REFLEX CULTURE PNL UR: ABNORMAL
UA DIPSTICK W REFLEX MICRO PNL UR: YES
URN SPEC COLLECT METH UR: ABNORMAL
UROBILINOGEN UR STRIP-ACNC: 0.2 E.U./DL
UROBILINOGEN, POC: 0.2 MG/DL
WBC # BLD AUTO: 7.5 K/UL (ref 4–11)
WBC #/AREA URNS AUTO: 2 /HPF (ref 0–5)

## 2025-05-14 PROCEDURE — 93005 ELECTROCARDIOGRAM TRACING: CPT | Performed by: EMERGENCY MEDICINE

## 2025-05-14 PROCEDURE — 96360 HYDRATION IV INFUSION INIT: CPT

## 2025-05-14 PROCEDURE — 99285 EMERGENCY DEPT VISIT HI MDM: CPT

## 2025-05-14 PROCEDURE — 6360000004 HC RX CONTRAST MEDICATION: Performed by: PHYSICIAN ASSISTANT

## 2025-05-14 PROCEDURE — 2580000003 HC RX 258: Performed by: PHYSICIAN ASSISTANT

## 2025-05-14 PROCEDURE — 82550 ASSAY OF CK (CPK): CPT

## 2025-05-14 PROCEDURE — 74177 CT ABD & PELVIS W/CONTRAST: CPT

## 2025-05-14 PROCEDURE — 85025 COMPLETE CBC W/AUTO DIFF WBC: CPT

## 2025-05-14 PROCEDURE — 81001 URINALYSIS AUTO W/SCOPE: CPT

## 2025-05-14 PROCEDURE — 83690 ASSAY OF LIPASE: CPT

## 2025-05-14 PROCEDURE — 93010 ELECTROCARDIOGRAM REPORT: CPT | Performed by: INTERNAL MEDICINE

## 2025-05-14 PROCEDURE — 83735 ASSAY OF MAGNESIUM: CPT

## 2025-05-14 PROCEDURE — 96361 HYDRATE IV INFUSION ADD-ON: CPT

## 2025-05-14 PROCEDURE — 80053 COMPREHEN METABOLIC PANEL: CPT

## 2025-05-14 RX ORDER — IOPAMIDOL 755 MG/ML
75 INJECTION, SOLUTION INTRAVASCULAR
Status: COMPLETED | OUTPATIENT
Start: 2025-05-14 | End: 2025-05-14

## 2025-05-14 RX ORDER — 0.9 % SODIUM CHLORIDE 0.9 %
500 INTRAVENOUS SOLUTION INTRAVENOUS ONCE
Status: COMPLETED | OUTPATIENT
Start: 2025-05-14 | End: 2025-05-14

## 2025-05-14 RX ADMIN — SODIUM CHLORIDE 500 ML: 0.9 INJECTION, SOLUTION INTRAVENOUS at 12:01

## 2025-05-14 RX ADMIN — IOPAMIDOL 75 ML: 755 INJECTION, SOLUTION INTRAVENOUS at 13:02

## 2025-05-14 SDOH — ECONOMIC STABILITY: FOOD INSECURITY: WITHIN THE PAST 12 MONTHS, THE FOOD YOU BOUGHT JUST DIDN'T LAST AND YOU DIDN'T HAVE MONEY TO GET MORE.: NEVER TRUE

## 2025-05-14 SDOH — ECONOMIC STABILITY: FOOD INSECURITY: WITHIN THE PAST 12 MONTHS, YOU WORRIED THAT YOUR FOOD WOULD RUN OUT BEFORE YOU GOT MONEY TO BUY MORE.: NEVER TRUE

## 2025-05-14 ASSESSMENT — PATIENT HEALTH QUESTIONNAIRE - PHQ9
SUM OF ALL RESPONSES TO PHQ QUESTIONS 1-9: 0
1. LITTLE INTEREST OR PLEASURE IN DOING THINGS: NOT AT ALL
SUM OF ALL RESPONSES TO PHQ QUESTIONS 1-9: 0
2. FEELING DOWN, DEPRESSED OR HOPELESS: NOT AT ALL

## 2025-05-14 ASSESSMENT — PAIN DESCRIPTION - ORIENTATION: ORIENTATION: LOWER

## 2025-05-14 ASSESSMENT — PAIN DESCRIPTION - DESCRIPTORS: DESCRIPTORS: PRESSURE

## 2025-05-14 ASSESSMENT — ENCOUNTER SYMPTOMS
BLOOD IN STOOL: 0
NAUSEA: 0
SHORTNESS OF BREATH: 0
COUGH: 0
CHEST TIGHTNESS: 0
VOMITING: 0
WHEEZING: 0
DIARRHEA: 0
ABDOMINAL PAIN: 0

## 2025-05-14 ASSESSMENT — PAIN - FUNCTIONAL ASSESSMENT: PAIN_FUNCTIONAL_ASSESSMENT: 0-10

## 2025-05-14 ASSESSMENT — PAIN SCALES - GENERAL: PAINLEVEL_OUTOF10: 1

## 2025-05-14 ASSESSMENT — PAIN DESCRIPTION - LOCATION: LOCATION: ABDOMEN

## 2025-05-14 NOTE — ASSESSMENT & PLAN NOTE
Persistent, with worsening proteinuria as well. Called nephrologist office, and patient to be directed to ER for further eval.

## 2025-05-14 NOTE — ED PROVIDER NOTES
In addition to the advanced practice provider, I personally saw Janell Gaffney and performed a substantive portion of the visit including all aspects of the medical decision making.    Medical Decision Making  Patient seen evaluated at bedside. Briefly, patient is presenting with new onset hematuria which started a couple days ago. She was seen in the emergency department at that time and was treated for urinary tract infection. She went to an urgent care today for worsening symptoms and then she was advised to get rechecked in the emergency department due to worsening proteinuria.    Lab workup negative for anemia or leukocytosis.  There are no clinically significant [place] abnormalities.  Urinalysis shows hematuria and proteinuria, however shows improvement from previous urinalysis.  Lipase is mildly elevated at 102.  CT abdomen/pelvis showed nonspecific peripancreatic inflammatory changes/underlying mass seen in the pancreatic head/uncinate process with cystic changes.  However, patient is well aware of these findings and we do suspect chronic process.  She does not have any acute epigastric abdominal pain/tenderness at this time.    Patient was given reassurance and advised continue supportive care at home and to follow-up with her urologist/nephrologist.  Follow-up to both of these specialist were provided to the patient.  Patient stable for discharge per    EKG  N/A      SEP-1  Is this patient to be included in the SEP-1 Core Measure due to severe sepsis or septic shock?   No     Exclusion criteria - the patient is NOT to be included for SEP-1 Core Measure due to:  2+ SIRS criteria are not met    Screenings     [place] Coma Scale  Eye Opening: Spontaneous  Best Verbal Response: Confused  Best Motor Response: Obeys commands  [place] Coma Scale Score: 14             Patient Referrals:  Al Merrill DO  [facility] [place] [id number]  [phone]    Schedule an appointment as soon as possible for a

## 2025-05-14 NOTE — ED PROVIDER NOTES
[facility] EMERGENCY DEPARTMENT  EMERGENCY DEPARTMENT ENCOUNTER        Pt Name: Janell Gaffney  MRN: [id number]  Birthdate 3/11/1935  Date of evaluation: 5/14/2025  Provider: Timmy Moran PA-C  PCP: Reyes, Eleia J, MD  Note Started: 11:11 AM EDT 5/14/25       I have seen and evaluated this patient with my supervising physician EVIN NEAL .      CHIEF COMPLAINT       Chief Complaint   Patient presents with    Proteinuria     Pt brought in by daughter from home states they were here on Saturday & diagnosed with UTI. States pt has been taking antbx as prescribed. Daughter states she noticed brown & red urine from pt today, was taken to PCP. Urine results today stated pt had no UTI and has high proteinuria. Pt daughter called kidney doctor, states they wanted her to come into ER        HISTORY OF PRESENT ILLNESS: 1 or more Elements     History From: patient  Limitations to history : None    Janell Gaffney is a 89 y.o. female who presents to the emergency department with a chief complaint of persistent urinary frequency now with worsening hematuria and some lower abdominal pressure.  She was seen here on 5/10 for similar symptoms and was having some frequency with possible UTI at that time was treated with Ceftin which she has been taking.  Symptoms are getting worse and she was seen by her PCP today having proteinuria on her dipstick.  She is anticoagulated on Eliquis with history of atrial fibrillation.  She denies nausea, vomiting, fevers or any other symptoms.    Nursing Notes were all reviewed and agreed with or any disagreements were addressed in the HPI.    REVIEW OF SYSTEMS :      Review of Systems    Positives and Pertinent negatives as per HPI.     SURGICAL HISTORY     Past Surgical History:   Procedure Laterality Date    CARDIOVERSION  09/10/2021    DCCV cardioversion due to A. fib by Dr. Funez    CATARACT REMOVAL WITH IMPLANT Bilateral 03/21/2012    By Dr. Yepez    CHOLECYSTECTOMY,

## 2025-05-14 NOTE — ED PROVIDER NOTES
EKG interpretation by me in absence of a face-to-face evaluation by me as follows:    The 12 lead EKG was interpreted by me independent of a cardiologist as follows:  Rate: tachycardia with a rate of 105  Rhythm: atrial fibrillation  Axis: normal  Intervals: narrow QRS normal QTc  ST segments: no ST elevations or depressions  T waves: no abnormal inversions  Non-specific T wave changes: present  Prior EKG comparison: EKG dated 12/3/21 is not significantly different        Remi Campos MD  05/14/25 [id number]

## 2025-05-14 NOTE — PROGRESS NOTES
Janell Gaffney (:  3/11/1935) is a 89 y.o. female,Established patient, here for evaluation of the following chief complaint(s):  Hematuria (ER follow up)      HPI  Patient of Dr. Reyes presents for hospital discharge follow-up from 5/10 for suspected urinary tract infection with hematuria, frequency and urgency. Patient was prescribed Ceftin, and was discharged home. Patient labs were stable, kidney function normal, UA showed large blood and bacteria present, as well as proteinuria, although urine culture did ultimately come back negative. Today, patient states urine is even darker in color and hematuria seems worse. She is otherwise still feeling the same, only complains of some pressure to low abdomen, no other s/s.     UA in office today still with significant gross hematuria, as well as protein increased from 30 on Saturday to >300 today. No sign infection.     ASSESSMENT/PLAN:  1. Hospital discharge follow-up  2. Hematuria, unspecified type  Assessment & Plan:  Persistent, with worsening proteinuria as well. Called nephrologist office, and patient to be directed to ER for further eval.   Orders:  -     POCT Urinalysis no Micro       /86   Pulse (!) 102   Temp 97.9 °F (36.6 °C) (Oral)   Resp 16   Ht 1.575 m (5' 2\")   Wt 55.8 kg (123 lb)   SpO2 96%   BMI 22.50 kg/m²      SUBJECTIVE/OBJECTIVE:  Review of Systems   Constitutional:  Negative for fatigue, fever and unexpected weight change.   Respiratory:  Negative for cough, chest tightness, shortness of breath and wheezing.    Cardiovascular:  Negative for chest pain, palpitations and leg swelling.   Gastrointestinal:  Negative for abdominal pain, blood in stool, diarrhea, nausea and vomiting.   Genitourinary:  Positive for frequency, hematuria and urgency. Negative for decreased urine volume, difficulty urinating, dysuria, flank pain and pelvic pain (+pelvic pressure, mild).   Neurological:  Negative for dizziness, weakness, light-headedness and

## 2025-06-26 ENCOUNTER — TELEPHONE (OUTPATIENT)
Dept: FAMILY MEDICINE CLINIC | Age: 89
End: 2025-06-26

## (undated) DEVICE — LAPAROSCOPIC SCISSORS: Brand: EPIX LAPAROSCOPIC SCISSORS

## (undated) DEVICE — LAPAROSCOPY PACK: Brand: MEDLINE INDUSTRIES, INC.

## (undated) DEVICE — DRAPE,LAP,CHOLE,W/TROUGHS,STERILE: Brand: MEDLINE

## (undated) DEVICE — CATHETER CHOLANGIOGRAM 4.5 FRX3 IN W/ 20018M55 TAUT INTRO

## (undated) DEVICE — SUTURE VCRL SZ 0 L27IN ABSRB VLT L22MM CT-3 1/2 CIR J329H

## (undated) DEVICE — CHLORAPREP 26ML ORANGE

## (undated) DEVICE — Device

## (undated) DEVICE — ADHESIVE SKIN CLSR 0.7ML TOP DERMBND ADV

## (undated) DEVICE — SYRINGE, LUER LOCK, 10ML: Brand: MEDLINE

## (undated) DEVICE — SET EXTN PRIMING 4.9ML L30IN INCL SLDE CLMP SPIN M LUERLOCK

## (undated) DEVICE — PLUMEPORT LAPAROSCOPIC SMOKE FILTRATION DEVICE: Brand: PLUMEPORT ACTIV

## (undated) DEVICE — APPLIER CLP M/L SHFT DIA5MM 15 LIG LIGAMAX 5

## (undated) DEVICE — NEEDLE HYPO 22GA L1.5IN BLK POLYPR HUB S STL REG BVL STR

## (undated) DEVICE — TROCAR: Brand: KII FIOS FIRST ENTRY

## (undated) DEVICE — COVER LT HNDL BLU PLAS

## (undated) DEVICE — MERCY FAIRFIELD TURNOVER KIT: Brand: MEDLINE INDUSTRIES, INC.

## (undated) DEVICE — TROCAR: Brand: KII® SLEEVE

## (undated) DEVICE — SYRINGE MED 30ML STD CLR PLAS LUERLOCK TIP N CTRL DISP

## (undated) DEVICE — CORD ES L10FT MPLR LAP

## (undated) DEVICE — SUTURE VCRL SZ 4-0 L18IN ABSRB UD L19MM PS-2 3/8 CIR PRIM J496H

## (undated) DEVICE — TISSUE RETRIEVAL SYSTEM: Brand: INZII RETRIEVAL SYSTEM

## (undated) DEVICE — C-ARM: Brand: UNBRANDED

## (undated) DEVICE — NEEDLE INSUF L120MM DIA2MM DISP FOR PNEUMOPERI ENDOPATH

## (undated) DEVICE — SUTURE ETHLN SZ 2-0 L30IN NONABSORBABLE BLK L36MM FSLX 3/8 1674H

## (undated) DEVICE — STERILE POLYISOPRENE POWDER-FREE SURGICAL GLOVES: Brand: PROTEXIS

## (undated) DEVICE — CATHETER CHOLGM 4.5FR L18IN W/ MTL SUPP TB

## (undated) DEVICE — SOLUTION IRRIG 1000ML 0.9% SOD CHL USP POUR PLAS BTL